# Patient Record
Sex: FEMALE | Race: WHITE | NOT HISPANIC OR LATINO
[De-identification: names, ages, dates, MRNs, and addresses within clinical notes are randomized per-mention and may not be internally consistent; named-entity substitution may affect disease eponyms.]

---

## 2014-11-05 RX ORDER — INSULIN GLARGINE 100 [IU]/ML
60 INJECTION, SOLUTION SUBCUTANEOUS
Qty: 0 | Refills: 0 | COMMUNITY
Start: 2014-11-05

## 2017-01-12 ENCOUNTER — APPOINTMENT (OUTPATIENT)
Dept: CARDIOLOGY | Facility: CLINIC | Age: 62
End: 2017-01-12

## 2017-01-12 ENCOUNTER — APPOINTMENT (OUTPATIENT)
Dept: FAMILY MEDICINE | Facility: CLINIC | Age: 62
End: 2017-01-12

## 2017-01-12 VITALS
SYSTOLIC BLOOD PRESSURE: 96 MMHG | BODY MASS INDEX: 45.66 KG/M2 | DIASTOLIC BLOOD PRESSURE: 64 MMHG | HEIGHT: 64 IN | HEART RATE: 105 BPM | OXYGEN SATURATION: 90 %

## 2017-01-12 VITALS
HEIGHT: 63.5 IN | DIASTOLIC BLOOD PRESSURE: 74 MMHG | HEART RATE: 112 BPM | SYSTOLIC BLOOD PRESSURE: 116 MMHG | WEIGHT: 266 LBS | BODY MASS INDEX: 46.55 KG/M2

## 2017-01-12 VITALS
DIASTOLIC BLOOD PRESSURE: 73 MMHG | SYSTOLIC BLOOD PRESSURE: 110 MMHG | WEIGHT: 266 LBS | HEART RATE: 104 BPM | BODY MASS INDEX: 45.66 KG/M2 | OXYGEN SATURATION: 94 %

## 2017-01-12 VITALS — HEART RATE: 100 BPM | SYSTOLIC BLOOD PRESSURE: 112 MMHG | DIASTOLIC BLOOD PRESSURE: 70 MMHG

## 2017-01-15 ENCOUNTER — NON-APPOINTMENT (OUTPATIENT)
Age: 62
End: 2017-01-15

## 2017-01-18 ENCOUNTER — APPOINTMENT (OUTPATIENT)
Dept: CARDIOLOGY | Facility: CLINIC | Age: 62
End: 2017-01-18

## 2017-01-18 ENCOUNTER — APPOINTMENT (OUTPATIENT)
Dept: UROLOGY | Facility: CLINIC | Age: 62
End: 2017-01-18

## 2017-01-18 VITALS — HEART RATE: 108 BPM | SYSTOLIC BLOOD PRESSURE: 116 MMHG | TEMPERATURE: 98 F | DIASTOLIC BLOOD PRESSURE: 72 MMHG

## 2017-01-19 ENCOUNTER — APPOINTMENT (OUTPATIENT)
Dept: CARDIOLOGY | Facility: CLINIC | Age: 62
End: 2017-01-19

## 2017-01-31 ENCOUNTER — APPOINTMENT (OUTPATIENT)
Dept: UROLOGY | Facility: CLINIC | Age: 62
End: 2017-01-31

## 2017-01-31 ENCOUNTER — APPOINTMENT (OUTPATIENT)
Dept: PULMONOLOGY | Facility: CLINIC | Age: 62
End: 2017-01-31

## 2017-01-31 VITALS — HEART RATE: 85 BPM | DIASTOLIC BLOOD PRESSURE: 60 MMHG | OXYGEN SATURATION: 93 % | SYSTOLIC BLOOD PRESSURE: 108 MMHG

## 2017-01-31 VITALS — WEIGHT: 260 LBS | BODY MASS INDEX: 45.5 KG/M2 | HEIGHT: 63.5 IN

## 2017-01-31 LAB
ANION GAP SERPL CALC-SCNC: 15 MMOL/L
APTT BLD: 33.7 SEC
BASOPHILS # BLD AUTO: 0.02 K/UL
BASOPHILS NFR BLD AUTO: 0.2 %
BUN SERPL-MCNC: 30 MG/DL
CALCIUM SERPL-MCNC: 9.2 MG/DL
CHLORIDE SERPL-SCNC: 106 MMOL/L
CO2 SERPL-SCNC: 22 MMOL/L
CREAT SERPL-MCNC: 1.42 MG/DL
EOSINOPHIL # BLD AUTO: 0.07 K/UL
EOSINOPHIL NFR BLD AUTO: 0.8 %
GLUCOSE SERPL-MCNC: 109 MG/DL
HCT VFR BLD CALC: 40.8 %
HGB BLD-MCNC: 13.1 G/DL
IMM GRANULOCYTES NFR BLD AUTO: 0.1 %
INR PPP: 1.11 RATIO
LYMPHOCYTES # BLD AUTO: 3.07 K/UL
LYMPHOCYTES NFR BLD AUTO: 35.2 %
MAN DIFF?: NORMAL
MCHC RBC-ENTMCNC: 31.3 PG
MCHC RBC-ENTMCNC: 32.1 GM/DL
MCV RBC AUTO: 97.4 FL
MONOCYTES # BLD AUTO: 1.35 K/UL
MONOCYTES NFR BLD AUTO: 15.5 %
NEUTROPHILS # BLD AUTO: 4.21 K/UL
NEUTROPHILS NFR BLD AUTO: 48.2 %
PLATELET # BLD AUTO: 329 K/UL
POTASSIUM SERPL-SCNC: 5.1 MMOL/L
PT BLD: 12.6 SEC
RBC # BLD: 4.19 M/UL
RBC # FLD: 15 %
SODIUM SERPL-SCNC: 143 MMOL/L
WBC # FLD AUTO: 8.73 K/UL

## 2017-02-03 RX ORDER — OXYCODONE AND ACETAMINOPHEN 5; 325 MG/1; MG/1
5-325 TABLET ORAL
Qty: 12 | Refills: 0 | Status: COMPLETED | COMMUNITY
Start: 2017-01-04

## 2017-02-05 LAB — COMPN STONE: NORMAL

## 2017-02-07 ENCOUNTER — APPOINTMENT (OUTPATIENT)
Dept: INTERVENTIONAL RADIOLOGY/VASCULAR | Facility: CLINIC | Age: 62
End: 2017-02-07

## 2017-02-14 ENCOUNTER — APPOINTMENT (OUTPATIENT)
Dept: INTERVENTIONAL RADIOLOGY/VASCULAR | Facility: CLINIC | Age: 62
End: 2017-02-14

## 2017-02-14 VITALS
OXYGEN SATURATION: 97 % | TEMPERATURE: 97.6 F | DIASTOLIC BLOOD PRESSURE: 68 MMHG | SYSTOLIC BLOOD PRESSURE: 109 MMHG | HEART RATE: 92 BPM | WEIGHT: 260 LBS | RESPIRATION RATE: 16 BRPM | BODY MASS INDEX: 44.39 KG/M2 | HEIGHT: 64 IN

## 2017-02-14 DIAGNOSIS — Z87.442 PERSONAL HISTORY OF URINARY CALCULI: ICD-10-CM

## 2017-02-14 DIAGNOSIS — Z63.5 DISRUPTION OF FAMILY BY SEPARATION AND DIVORCE: ICD-10-CM

## 2017-02-14 DIAGNOSIS — Z81.1 FAMILY HISTORY OF ALCOHOL ABUSE AND DEPENDENCE: ICD-10-CM

## 2017-02-14 DIAGNOSIS — Z78.9 OTHER SPECIFIED HEALTH STATUS: ICD-10-CM

## 2017-02-14 DIAGNOSIS — Z83.3 FAMILY HISTORY OF DIABETES MELLITUS: ICD-10-CM

## 2017-02-14 RX ORDER — AZITHROMYCIN 250 MG/1
250 TABLET, FILM COATED ORAL
Qty: 1 | Refills: 1 | Status: DISCONTINUED | COMMUNITY
Start: 2017-02-03 | End: 2017-02-14

## 2017-02-14 RX ORDER — GUAIFEN/DEXTROMETHORPHAN/PE 100-10-5MG
5-10-100 LIQUID (ML) ORAL EVERY 4 HOURS
Qty: 1 | Refills: 0 | Status: DISCONTINUED | COMMUNITY
Start: 2017-02-03 | End: 2017-02-14

## 2017-02-14 RX ORDER — DULAGLUTIDE 1.5 MG/.5ML
1.5 INJECTION, SOLUTION SUBCUTANEOUS
Qty: 6 | Refills: 0 | Status: DISCONTINUED | COMMUNITY
Start: 2016-12-15 | End: 2017-02-14

## 2017-02-14 RX ORDER — ONDANSETRON 4 MG/1
4 TABLET, ORALLY DISINTEGRATING ORAL
Qty: 8 | Refills: 0 | Status: DISCONTINUED | COMMUNITY
Start: 2017-01-04 | End: 2017-02-14

## 2017-02-14 RX ORDER — METOPROLOL TARTRATE 50 MG/1
50 TABLET, FILM COATED ORAL
Refills: 0 | Status: DISCONTINUED | COMMUNITY
Start: 2017-01-31 | End: 2017-02-14

## 2017-02-14 SDOH — SOCIAL STABILITY - SOCIAL INSECURITY: DISRUPTION OF FAMILY BY SEPARATION AND DIVORCE: Z63.5

## 2017-02-22 ENCOUNTER — MEDICATION RENEWAL (OUTPATIENT)
Age: 62
End: 2017-02-22

## 2017-03-02 ENCOUNTER — OUTPATIENT (OUTPATIENT)
Dept: OUTPATIENT SERVICES | Facility: HOSPITAL | Age: 62
LOS: 1 days | End: 2017-03-02
Payer: MEDICARE

## 2017-03-02 VITALS
TEMPERATURE: 98 F | OXYGEN SATURATION: 97 % | SYSTOLIC BLOOD PRESSURE: 90 MMHG | WEIGHT: 255.07 LBS | DIASTOLIC BLOOD PRESSURE: 60 MMHG | RESPIRATION RATE: 20 BRPM | HEART RATE: 82 BPM | HEIGHT: 63.5 IN

## 2017-03-02 DIAGNOSIS — Z01.818 ENCOUNTER FOR OTHER PREPROCEDURAL EXAMINATION: ICD-10-CM

## 2017-03-02 DIAGNOSIS — Z95.2 PRESENCE OF PROSTHETIC HEART VALVE: Chronic | ICD-10-CM

## 2017-03-02 DIAGNOSIS — N28.89 OTHER SPECIFIED DISORDERS OF KIDNEY AND URETER: ICD-10-CM

## 2017-03-02 DIAGNOSIS — Z95.1 PRESENCE OF AORTOCORONARY BYPASS GRAFT: Chronic | ICD-10-CM

## 2017-03-02 LAB
ANION GAP SERPL CALC-SCNC: 17 MMOL/L — SIGNIFICANT CHANGE UP (ref 5–17)
BLD GP AB SCN SERPL QL: NEGATIVE — SIGNIFICANT CHANGE UP
BUN SERPL-MCNC: 26 MG/DL — HIGH (ref 7–23)
CALCIUM SERPL-MCNC: 9.7 MG/DL — SIGNIFICANT CHANGE UP (ref 8.4–10.5)
CHLORIDE SERPL-SCNC: 104 MMOL/L — SIGNIFICANT CHANGE UP (ref 96–108)
CO2 SERPL-SCNC: 20 MMOL/L — LOW (ref 22–31)
CREAT SERPL-MCNC: 1.24 MG/DL — SIGNIFICANT CHANGE UP (ref 0.5–1.3)
GLUCOSE SERPL-MCNC: 90 MG/DL — SIGNIFICANT CHANGE UP (ref 70–99)
HCT VFR BLD CALC: 40.6 % — SIGNIFICANT CHANGE UP (ref 34.5–45)
HGB BLD-MCNC: 13.4 G/DL — SIGNIFICANT CHANGE UP (ref 11.5–15.5)
MCHC RBC-ENTMCNC: 31.3 PG — SIGNIFICANT CHANGE UP (ref 27–34)
MCHC RBC-ENTMCNC: 33 GM/DL — SIGNIFICANT CHANGE UP (ref 32–36)
MCV RBC AUTO: 94.9 FL — SIGNIFICANT CHANGE UP (ref 80–100)
PLATELET # BLD AUTO: 332 K/UL — SIGNIFICANT CHANGE UP (ref 150–400)
POTASSIUM SERPL-MCNC: 4.2 MMOL/L — SIGNIFICANT CHANGE UP (ref 3.5–5.3)
POTASSIUM SERPL-SCNC: 4.2 MMOL/L — SIGNIFICANT CHANGE UP (ref 3.5–5.3)
RBC # BLD: 4.28 M/UL — SIGNIFICANT CHANGE UP (ref 3.8–5.2)
RBC # FLD: 14.9 % — HIGH (ref 10.3–14.5)
RH IG SCN BLD-IMP: POSITIVE — SIGNIFICANT CHANGE UP
SODIUM SERPL-SCNC: 141 MMOL/L — SIGNIFICANT CHANGE UP (ref 135–145)
WBC # BLD: 12.91 K/UL — HIGH (ref 3.8–10.5)
WBC # FLD AUTO: 12.91 K/UL — HIGH (ref 3.8–10.5)

## 2017-03-02 NOTE — H&P PST ADULT - PMH
Aortic stenosis    Asthma    COPD (chronic obstructive pulmonary disease)    Diabetes mellitus    Hyperlipidemia    Hypertension    Mitral valve disorder    Obstructive sleep apnea  does not use CPAP machine

## 2017-03-02 NOTE — H&P PST ADULT - HISTORY OF PRESENT ILLNESS
62 y/o F PMH COPD, no recent steroid use, aortic stenosis, mitral valve regurgitation, S/P bioprosthetic MVR and AVR 2014, DM type 2, found to have right renal tumor while in the hospital for nephrolithiasis 1/2017, calculus passed spontaneously.  Presents today for ablation of right renal tumor. 62 y/o F PMH COPD, no recent steroid use, ARIK, aortic stenosis, mitral valve regurgitation, S/P bioprosthetic MVR and AVR 2014, DM type 2, found to have right renal tumor while in the hospital for nephrolithiasis 1/2017, calculus passed spontaneously.  Presents today for ablation of right renal tumor.

## 2017-03-02 NOTE — H&P PST ADULT - PSH
History of  section  1983  S/P AVR    S/P cholecystectomy    S/P MVR (mitral valve replacement)    S/P tonsillectomy

## 2017-03-03 PROCEDURE — 80048 BASIC METABOLIC PNL TOTAL CA: CPT

## 2017-03-03 PROCEDURE — 83036 HEMOGLOBIN GLYCOSYLATED A1C: CPT

## 2017-03-03 PROCEDURE — 87086 URINE CULTURE/COLONY COUNT: CPT

## 2017-03-03 PROCEDURE — 86900 BLOOD TYPING SEROLOGIC ABO: CPT

## 2017-03-03 PROCEDURE — 86901 BLOOD TYPING SEROLOGIC RH(D): CPT

## 2017-03-03 PROCEDURE — 86850 RBC ANTIBODY SCREEN: CPT

## 2017-03-03 PROCEDURE — 85027 COMPLETE CBC AUTOMATED: CPT

## 2017-03-03 PROCEDURE — G0463: CPT

## 2017-03-04 LAB
CULTURE RESULTS: SIGNIFICANT CHANGE UP
HBA1C BLD-MCNC: 7 % — HIGH (ref 4–5.6)
SPECIMEN SOURCE: SIGNIFICANT CHANGE UP

## 2017-03-06 ENCOUNTER — RESULT REVIEW (OUTPATIENT)
Age: 62
End: 2017-03-06

## 2017-03-07 ENCOUNTER — INPATIENT (INPATIENT)
Facility: HOSPITAL | Age: 62
LOS: 0 days | Discharge: HOME | DRG: 660 | End: 2017-03-08
Attending: INTERNAL MEDICINE | Admitting: INTERNAL MEDICINE
Payer: MEDICARE

## 2017-03-07 ENCOUNTER — APPOINTMENT (OUTPATIENT)
Dept: CT IMAGING | Facility: HOSPITAL | Age: 62
End: 2017-03-07

## 2017-03-07 VITALS
RESPIRATION RATE: 18 BRPM | OXYGEN SATURATION: 99 % | HEIGHT: 63.5 IN | DIASTOLIC BLOOD PRESSURE: 63 MMHG | TEMPERATURE: 97 F | SYSTOLIC BLOOD PRESSURE: 133 MMHG | HEART RATE: 72 BPM | WEIGHT: 255.96 LBS

## 2017-03-07 DIAGNOSIS — R58 HEMORRHAGE, NOT ELSEWHERE CLASSIFIED: ICD-10-CM

## 2017-03-07 DIAGNOSIS — N28.89 OTHER SPECIFIED DISORDERS OF KIDNEY AND URETER: ICD-10-CM

## 2017-03-07 DIAGNOSIS — D72.829 ELEVATED WHITE BLOOD CELL COUNT, UNSPECIFIED: ICD-10-CM

## 2017-03-07 DIAGNOSIS — E11.9 TYPE 2 DIABETES MELLITUS WITHOUT COMPLICATIONS: ICD-10-CM

## 2017-03-07 DIAGNOSIS — E78.5 HYPERLIPIDEMIA, UNSPECIFIED: ICD-10-CM

## 2017-03-07 DIAGNOSIS — Z95.2 PRESENCE OF PROSTHETIC HEART VALVE: Chronic | ICD-10-CM

## 2017-03-07 DIAGNOSIS — Z98.890 OTHER SPECIFIED POSTPROCEDURAL STATES: Chronic | ICD-10-CM

## 2017-03-07 DIAGNOSIS — Z01.818 ENCOUNTER FOR OTHER PREPROCEDURAL EXAMINATION: ICD-10-CM

## 2017-03-07 DIAGNOSIS — I10 ESSENTIAL (PRIMARY) HYPERTENSION: ICD-10-CM

## 2017-03-07 DIAGNOSIS — J43.9 EMPHYSEMA, UNSPECIFIED: ICD-10-CM

## 2017-03-07 LAB
APTT BLD: 29.7 SEC — SIGNIFICANT CHANGE UP (ref 27.5–37.4)
HCT VFR BLD CALC: 37.3 % — SIGNIFICANT CHANGE UP (ref 34.5–45)
HCT VFR BLD CALC: 38.5 % — SIGNIFICANT CHANGE UP (ref 34.5–45)
HGB BLD-MCNC: 12.8 G/DL — SIGNIFICANT CHANGE UP (ref 11.5–15.5)
HGB BLD-MCNC: 13.2 G/DL — SIGNIFICANT CHANGE UP (ref 11.5–15.5)
INR BLD: 1.19 RATIO — HIGH (ref 0.88–1.16)
MCHC RBC-ENTMCNC: 32.5 PG — SIGNIFICANT CHANGE UP (ref 27–34)
MCHC RBC-ENTMCNC: 32.7 PG — SIGNIFICANT CHANGE UP (ref 27–34)
MCHC RBC-ENTMCNC: 34.3 GM/DL — SIGNIFICANT CHANGE UP (ref 32–36)
MCHC RBC-ENTMCNC: 34.3 GM/DL — SIGNIFICANT CHANGE UP (ref 32–36)
MCV RBC AUTO: 94.6 FL — SIGNIFICANT CHANGE UP (ref 80–100)
MCV RBC AUTO: 95.4 FL — SIGNIFICANT CHANGE UP (ref 80–100)
PLATELET # BLD AUTO: 270 K/UL — SIGNIFICANT CHANGE UP (ref 150–400)
PLATELET # BLD AUTO: 294 K/UL — SIGNIFICANT CHANGE UP (ref 150–400)
PROTHROM AB SERPL-ACNC: 12.9 SEC — SIGNIFICANT CHANGE UP (ref 10–13.1)
RBC # BLD: 3.91 M/UL — SIGNIFICANT CHANGE UP (ref 3.8–5.2)
RBC # BLD: 4.06 M/UL — SIGNIFICANT CHANGE UP (ref 3.8–5.2)
RBC # FLD: 13.3 % — SIGNIFICANT CHANGE UP (ref 10.3–14.5)
RBC # FLD: 13.5 % — SIGNIFICANT CHANGE UP (ref 10.3–14.5)
WBC # BLD: 13.3 K/UL — HIGH (ref 3.8–10.5)
WBC # BLD: 15.6 K/UL — HIGH (ref 3.8–10.5)
WBC # FLD AUTO: 13.3 K/UL — HIGH (ref 3.8–10.5)
WBC # FLD AUTO: 15.6 K/UL — HIGH (ref 3.8–10.5)

## 2017-03-07 PROCEDURE — 50200 RENAL BIOPSY PERQ: CPT | Mod: RT

## 2017-03-07 PROCEDURE — 77012 CT SCAN FOR NEEDLE BIOPSY: CPT | Mod: 26,59

## 2017-03-07 PROCEDURE — 99223 1ST HOSP IP/OBS HIGH 75: CPT

## 2017-03-07 PROCEDURE — 50593 PERC CRYO ABLATE RENAL TUM: CPT | Mod: RT

## 2017-03-07 PROCEDURE — 88305 TISSUE EXAM BY PATHOLOGIST: CPT | Mod: 26

## 2017-03-07 PROCEDURE — 88173 CYTOPATH EVAL FNA REPORT: CPT | Mod: 26

## 2017-03-07 PROCEDURE — 77013 CT GUIDE FOR TISSUE ABLATION: CPT | Mod: 26

## 2017-03-07 RX ORDER — LISINOPRIL 2.5 MG/1
5 TABLET ORAL DAILY
Qty: 0 | Refills: 0 | Status: DISCONTINUED | OUTPATIENT
Start: 2017-03-07 | End: 2017-03-08

## 2017-03-07 RX ORDER — FLUTICASONE PROPIONATE 50 MCG
1 SPRAY, SUSPENSION NASAL DAILY
Qty: 0 | Refills: 0 | Status: DISCONTINUED | OUTPATIENT
Start: 2017-03-07 | End: 2017-03-08

## 2017-03-07 RX ORDER — BUPROPION HYDROCHLORIDE 150 MG/1
200 TABLET, EXTENDED RELEASE ORAL DAILY
Qty: 0 | Refills: 0 | Status: DISCONTINUED | OUTPATIENT
Start: 2017-03-07 | End: 2017-03-08

## 2017-03-07 RX ORDER — DEXTROSE 50 % IN WATER 50 %
25 SYRINGE (ML) INTRAVENOUS ONCE
Qty: 0 | Refills: 0 | Status: DISCONTINUED | OUTPATIENT
Start: 2017-03-07 | End: 2017-03-08

## 2017-03-07 RX ORDER — METOPROLOL TARTRATE 50 MG
12.5 TABLET ORAL
Qty: 0 | Refills: 0 | Status: DISCONTINUED | OUTPATIENT
Start: 2017-03-07 | End: 2017-03-08

## 2017-03-07 RX ORDER — ACETAMINOPHEN 500 MG
650 TABLET ORAL EVERY 6 HOURS
Qty: 0 | Refills: 0 | Status: DISCONTINUED | OUTPATIENT
Start: 2017-03-07 | End: 2017-03-08

## 2017-03-07 RX ORDER — INSULIN GLARGINE 100 [IU]/ML
40 INJECTION, SOLUTION SUBCUTANEOUS AT BEDTIME
Qty: 0 | Refills: 0 | Status: DISCONTINUED | OUTPATIENT
Start: 2017-03-07 | End: 2017-03-08

## 2017-03-07 RX ORDER — ALBUTEROL 90 UG/1
2 AEROSOL, METERED ORAL
Qty: 0 | Refills: 0 | COMMUNITY

## 2017-03-07 RX ORDER — ALBUTEROL 90 UG/1
2.5 AEROSOL, METERED ORAL EVERY 4 HOURS
Qty: 0 | Refills: 0 | Status: DISCONTINUED | OUTPATIENT
Start: 2017-03-07 | End: 2017-03-07

## 2017-03-07 RX ORDER — DEXTROSE 50 % IN WATER 50 %
1 SYRINGE (ML) INTRAVENOUS ONCE
Qty: 0 | Refills: 0 | Status: DISCONTINUED | OUTPATIENT
Start: 2017-03-07 | End: 2017-03-08

## 2017-03-07 RX ORDER — ATORVASTATIN CALCIUM 80 MG/1
40 TABLET, FILM COATED ORAL AT BEDTIME
Qty: 0 | Refills: 0 | Status: DISCONTINUED | OUTPATIENT
Start: 2017-03-07 | End: 2017-03-08

## 2017-03-07 RX ORDER — INSULIN LISPRO 100/ML
VIAL (ML) SUBCUTANEOUS
Qty: 0 | Refills: 0 | Status: DISCONTINUED | OUTPATIENT
Start: 2017-03-07 | End: 2017-03-08

## 2017-03-07 RX ORDER — METOPROLOL TARTRATE 50 MG
1 TABLET ORAL
Qty: 0 | Refills: 0 | COMMUNITY

## 2017-03-07 RX ORDER — GABAPENTIN 400 MG/1
300 CAPSULE ORAL
Qty: 0 | Refills: 0 | Status: DISCONTINUED | OUTPATIENT
Start: 2017-03-07 | End: 2017-03-08

## 2017-03-07 RX ORDER — GLUCAGON INJECTION, SOLUTION 0.5 MG/.1ML
1 INJECTION, SOLUTION SUBCUTANEOUS ONCE
Qty: 0 | Refills: 0 | Status: DISCONTINUED | OUTPATIENT
Start: 2017-03-07 | End: 2017-03-08

## 2017-03-07 RX ORDER — ALBUTEROL 90 UG/1
2.5 AEROSOL, METERED ORAL EVERY 4 HOURS
Qty: 0 | Refills: 0 | Status: DISCONTINUED | OUTPATIENT
Start: 2017-03-07 | End: 2017-03-08

## 2017-03-07 RX ORDER — SODIUM CHLORIDE 9 MG/ML
1000 INJECTION, SOLUTION INTRAVENOUS
Qty: 0 | Refills: 0 | Status: DISCONTINUED | OUTPATIENT
Start: 2017-03-07 | End: 2017-03-08

## 2017-03-07 RX ORDER — LORATADINE 10 MG/1
10 TABLET ORAL DAILY
Qty: 0 | Refills: 0 | Status: DISCONTINUED | OUTPATIENT
Start: 2017-03-07 | End: 2017-03-08

## 2017-03-07 RX ORDER — TIOTROPIUM BROMIDE 18 UG/1
1 CAPSULE ORAL; RESPIRATORY (INHALATION) DAILY
Qty: 0 | Refills: 0 | Status: DISCONTINUED | OUTPATIENT
Start: 2017-03-07 | End: 2017-03-08

## 2017-03-07 RX ORDER — DEXTROSE 50 % IN WATER 50 %
12.5 SYRINGE (ML) INTRAVENOUS ONCE
Qty: 0 | Refills: 0 | Status: DISCONTINUED | OUTPATIENT
Start: 2017-03-07 | End: 2017-03-08

## 2017-03-07 RX ADMIN — ATORVASTATIN CALCIUM 40 MILLIGRAM(S): 80 TABLET, FILM COATED ORAL at 22:11

## 2017-03-07 RX ADMIN — TIOTROPIUM BROMIDE 1 CAPSULE(S): 18 CAPSULE ORAL; RESPIRATORY (INHALATION) at 17:00

## 2017-03-07 RX ADMIN — LORATADINE 10 MILLIGRAM(S): 10 TABLET ORAL at 22:11

## 2017-03-07 RX ADMIN — INSULIN GLARGINE 40 UNIT(S): 100 INJECTION, SOLUTION SUBCUTANEOUS at 22:11

## 2017-03-07 NOTE — H&P ADULT. - ASSESSMENT
62 yo woman with PMH of COPD, ARIK on CPAP, AS/MR s/p bioprosthetic MVR/AVR 2014, DM2 on insulin, HLD admitted s/p CT guided kidney mass biopsy and mass cryoablation by IR.  Procedure c/b small renal bleed.  Pt c/o R flank/chest pain.  Vitals stable.

## 2017-03-07 NOTE — H&P ADULT. - PROBLEM SELECTOR PLAN 5
Hold PO diabetic meds  Will lower lantus to 40units tonight due to decreased PO diet  Unclear trulicity dose - neither pt nor daughter know dose, not in PMD records - but due on Mondays so does not need today or tomorrow

## 2017-03-07 NOTE — H&P ADULT. - PMH
Aortic stenosis    Asthma    COPD (chronic obstructive pulmonary disease)    Diabetes mellitus    Hyperlipidemia    Hypertension    Kidney mass    Mitral valve disorder    Obstructive sleep apnea  does not use CPAP machine

## 2017-03-07 NOTE — H&P ADULT. - PROBLEM SELECTOR PLAN 7
Known on outpt labs - no evidence of any infection - may be related to possible kidney cancer - can f/u as outpt

## 2017-03-07 NOTE — H&P ADULT. - PSH
History of  section  1983  S/P AVR    S/P cholecystectomy    S/P MVR (mitral valve replacement)    S/P tonsillectomy    Status post cryoablation  Of R kidney mass 3/7/17

## 2017-03-07 NOTE — H&P ADULT. - PROBLEM SELECTOR PLAN 2
Procedure with small post-op renal bleed - checking another CBC stat now, if stable can check again in AM  Holding DVT ppx and ASA for now

## 2017-03-07 NOTE — H&P ADULT. - FAMILY HISTORY
Sibling  Still living? Yes, Estimated age: 51-60  Family history of hypertension, Age at diagnosis: Age Unknown

## 2017-03-07 NOTE — H&P ADULT. - HISTORY OF PRESENT ILLNESS
60 yo woman with PMH of COPD, ARIK on CPAP, AS/MR s/p bioprosthetic MVR/AVR 2014, DM2 on insulin, HLD admitted s/p IR procedure.  Pt reportedly had an incidental finding on a CT scan of a R kidney mass.  Today she underwent a CT guided biopsy and cryoablation of the mass.  Per procedure note, post-op there was blood seen around the kidney but not thought to be of clinical significance.  Pt reports R flank/chest pain, worse on inspiration, started after procedure, 5/10 in severity.  Denies SOB, dizziness, N/V.

## 2017-03-08 ENCOUNTER — TRANSCRIPTION ENCOUNTER (OUTPATIENT)
Age: 62
End: 2017-03-08

## 2017-03-08 VITALS
TEMPERATURE: 99 F | OXYGEN SATURATION: 97 % | HEART RATE: 93 BPM | SYSTOLIC BLOOD PRESSURE: 124 MMHG | DIASTOLIC BLOOD PRESSURE: 58 MMHG | RESPIRATION RATE: 18 BRPM

## 2017-03-08 LAB
ALBUMIN SERPL ELPH-MCNC: 3.1 G/DL — LOW (ref 3.3–5)
ALP SERPL-CCNC: 115 U/L — SIGNIFICANT CHANGE UP (ref 40–120)
ALT FLD-CCNC: 94 U/L — HIGH (ref 10–45)
ANION GAP SERPL CALC-SCNC: 14 MMOL/L — SIGNIFICANT CHANGE UP (ref 5–17)
AST SERPL-CCNC: 99 U/L — HIGH (ref 10–40)
BASOPHILS # BLD AUTO: 0 K/UL — SIGNIFICANT CHANGE UP (ref 0–0.2)
BASOPHILS NFR BLD AUTO: 0 % — SIGNIFICANT CHANGE UP (ref 0–2)
BILIRUB SERPL-MCNC: 0.6 MG/DL — SIGNIFICANT CHANGE UP (ref 0.2–1.2)
BUN SERPL-MCNC: 27 MG/DL — HIGH (ref 7–23)
CALCIUM SERPL-MCNC: 8.6 MG/DL — SIGNIFICANT CHANGE UP (ref 8.4–10.5)
CHLORIDE SERPL-SCNC: 104 MMOL/L — SIGNIFICANT CHANGE UP (ref 96–108)
CO2 SERPL-SCNC: 20 MMOL/L — LOW (ref 22–31)
CREAT SERPL-MCNC: 1.19 MG/DL — SIGNIFICANT CHANGE UP (ref 0.5–1.3)
EOSINOPHIL # BLD AUTO: 0.23 K/UL — SIGNIFICANT CHANGE UP (ref 0–0.5)
EOSINOPHIL NFR BLD AUTO: 1.7 % — SIGNIFICANT CHANGE UP (ref 0–6)
GLUCOSE SERPL-MCNC: 109 MG/DL — HIGH (ref 70–99)
HCT VFR BLD CALC: 36 % — SIGNIFICANT CHANGE UP (ref 34.5–45)
HGB BLD-MCNC: 11.7 G/DL — SIGNIFICANT CHANGE UP (ref 11.5–15.5)
LYMPHOCYTES # BLD AUTO: 23.5 % — SIGNIFICANT CHANGE UP (ref 13–44)
LYMPHOCYTES # BLD AUTO: 3.21 K/UL — SIGNIFICANT CHANGE UP (ref 1–3.3)
MCHC RBC-ENTMCNC: 31 PG — SIGNIFICANT CHANGE UP (ref 27–34)
MCHC RBC-ENTMCNC: 32.5 GM/DL — SIGNIFICANT CHANGE UP (ref 32–36)
MCV RBC AUTO: 95.5 FL — SIGNIFICANT CHANGE UP (ref 80–100)
MONOCYTES # BLD AUTO: 0.96 K/UL — HIGH (ref 0–0.9)
MONOCYTES NFR BLD AUTO: 7 % — SIGNIFICANT CHANGE UP (ref 2–14)
NEUTROPHILS # BLD AUTO: 9.27 K/UL — HIGH (ref 1.8–7.4)
NEUTROPHILS NFR BLD AUTO: 67.8 % — SIGNIFICANT CHANGE UP (ref 43–77)
PLATELET # BLD AUTO: 270 K/UL — SIGNIFICANT CHANGE UP (ref 150–400)
POTASSIUM SERPL-MCNC: 5 MMOL/L — SIGNIFICANT CHANGE UP (ref 3.5–5.3)
POTASSIUM SERPL-SCNC: 5 MMOL/L — SIGNIFICANT CHANGE UP (ref 3.5–5.3)
PROT SERPL-MCNC: 7.1 G/DL — SIGNIFICANT CHANGE UP (ref 6–8.3)
RBC # BLD: 3.77 M/UL — LOW (ref 3.8–5.2)
RBC # FLD: 14.7 % — HIGH (ref 10.3–14.5)
SODIUM SERPL-SCNC: 138 MMOL/L — SIGNIFICANT CHANGE UP (ref 135–145)
WBC # BLD: 13.67 K/UL — HIGH (ref 3.8–10.5)
WBC # FLD AUTO: 13.67 K/UL — HIGH (ref 3.8–10.5)

## 2017-03-08 PROCEDURE — 94640 AIRWAY INHALATION TREATMENT: CPT

## 2017-03-08 PROCEDURE — 88305 TISSUE EXAM BY PATHOLOGIST: CPT

## 2017-03-08 PROCEDURE — 80053 COMPREHEN METABOLIC PANEL: CPT

## 2017-03-08 PROCEDURE — 77013 CT GUIDE FOR TISSUE ABLATION: CPT

## 2017-03-08 PROCEDURE — 85730 THROMBOPLASTIN TIME PARTIAL: CPT

## 2017-03-08 PROCEDURE — 88173 CYTOPATH EVAL FNA REPORT: CPT

## 2017-03-08 PROCEDURE — 99239 HOSP IP/OBS DSCHRG MGMT >30: CPT

## 2017-03-08 PROCEDURE — 50593 PERC CRYO ABLATE RENAL TUM: CPT

## 2017-03-08 PROCEDURE — 86900 BLOOD TYPING SEROLOGIC ABO: CPT

## 2017-03-08 PROCEDURE — 50200 RENAL BIOPSY PERQ: CPT

## 2017-03-08 PROCEDURE — 85610 PROTHROMBIN TIME: CPT

## 2017-03-08 PROCEDURE — 88172 CYTP DX EVAL FNA 1ST EA SITE: CPT

## 2017-03-08 PROCEDURE — 85027 COMPLETE CBC AUTOMATED: CPT

## 2017-03-08 PROCEDURE — 77012 CT SCAN FOR NEEDLE BIOPSY: CPT | Mod: 59

## 2017-03-08 PROCEDURE — C2618: CPT

## 2017-03-08 PROCEDURE — 86901 BLOOD TYPING SEROLOGIC RH(D): CPT

## 2017-03-08 PROCEDURE — 86850 RBC ANTIBODY SCREEN: CPT

## 2017-03-08 RX ORDER — LORATADINE 10 MG/1
1 TABLET ORAL
Qty: 0 | Refills: 0 | COMMUNITY
Start: 2017-03-08

## 2017-03-08 RX ORDER — FUROSEMIDE 40 MG
1 TABLET ORAL
Qty: 0 | Refills: 0 | COMMUNITY

## 2017-03-08 RX ORDER — ACETAMINOPHEN 500 MG
2 TABLET ORAL
Qty: 0 | Refills: 0 | COMMUNITY
Start: 2017-03-08

## 2017-03-08 RX ADMIN — BUPROPION HYDROCHLORIDE 200 MILLIGRAM(S): 150 TABLET, EXTENDED RELEASE ORAL at 11:49

## 2017-03-08 RX ADMIN — TIOTROPIUM BROMIDE 1 CAPSULE(S): 18 CAPSULE ORAL; RESPIRATORY (INHALATION) at 11:50

## 2017-03-08 RX ADMIN — LORATADINE 10 MILLIGRAM(S): 10 TABLET ORAL at 11:52

## 2017-03-08 RX ADMIN — Medication 2: at 11:49

## 2017-03-08 NOTE — PROVIDER CONTACT NOTE (MEDICATION) - RECOMMENDATIONS
Hold Lisinopril 5mg, metoprolol 12.5mg is SBP<110 Hold Lisinopril 5mg if is SBP<110, metoprolol 12.5mg if SBP<100

## 2017-03-08 NOTE — DIETITIAN INITIAL EVALUATION ADULT. - OTHER INFO
Nutrition consult received for BMI > 40. Pt reports good appetite consuming >75% dinner last night. Denies recent GI distress but states some nausea secondary to kidney stones x 2 months PTA. Pt reports starting diet to lose weight ~1 month PTA by counting points from [employer], following [employer] book, and using handouts about counting carbohydrates. Pt manages T2DM at home by counting carbohydrates, checking fingersticks three times daily at home (usually 90-130mg/dL), taking Lantus at night, taking Invokana in the morning, taking Trulicity once weekly, and taking Glimepiride once daily. Pt displays good understanding of carbohydrate counting. Pt wears partial dentures and has some bottom teeth missing but denies difficulty chewing and need for texture modified diet. Denies difficulty swallowing. Reports NKFA.

## 2017-03-08 NOTE — DISCHARGE NOTE ADULT - PLAN OF CARE
RESOLUTION OF MASS Please follow up with Dr. Rodriguez for biopsy results and Dr Reno for post-op care cpap at nights Call your Health Care provider upon arrival home to make a follow up appointment within one week.  Take all inhalers as prescribed by your Health Care Provider.  Take steroids as prescribed by your Health Care Provider.  If your cough increases infrequency and severity and/or you have shortness of breath or increased shortness of breath call your Health Care Provider.  If you develop fever, chills, night sweats, malaise, and/or change in mental status call your Health care Provider.  Nutrition is very important.  Eat small frequent meals.  Increase your activity as tolerated.  Do not stay in bed all day Coronary artery disease is a condition where the arteries the supply the heart muscle get clogges with fatty deposits & puts you at risk for a heart attack  Call your doctor if you have any new pain, pressure, or discomfort in the center of your chest, pain, tingling or discomfort in arms, back, neck, jaw, or stomach, shortness of breath, nausea, vomiting, burping or heartburn, sweating, cold and clammy skin, racing or abnormal heartbeat for more than 10 minutes or if they keep coming & going.  Call 911 and do not tr to get to hospital by care  You can help yourself with lefestyle changes (quitting smoking if you smoke), eat lots of fruits & vegetables & low fat dairy products, not a lot of meat & fatty foods, walk or some form of physical activity most days of the week, lose weight if you are overweight  Take your cardiac medication as prescribed to lower cholesterol, to lower blood pressure, aspirin to prevent blood clots, and diabetes control  Make sure to keep appointments with doctor for cardiac follow up care HgA1C this admission.  Make sure you get your HgA1c checked every three months.  If you take oral diabetes medications, check your blood glucose two times a day.  If you take insulin, check your blood glucose before meals and at bedtime.  It's important not to skip any meals.  Keep a log of your blood glucose results and always take it with you to your doctor appointments.  Keep a list of your current medications including injectables and over the counter medications and bring this medication list with you to all your doctor appointments.  If you have not seen your ophthalmologist this year call for appointment.  Check your feet daily for redness, sores, or openings. Do not self treat. If no improvement in two days call your primary care physician for an appointment.  Low blood sugar (hypoglycemia) is a blood sugar below 70mg/dl. Check your blood sugar if you feel signs/symptoms of hypoglycemia. If your blood sugar is below 70 take 15 grams of carbohydrates (ex 4 oz of apple juice, 3-4 glucose tablets, or 4-6 oz of regular soda) wait 15 minutes and repeat blood sugar to make sure it comes up above 70.  If your blood sugar is above 70 and you are due for a meal, have a meal.  If you are not due for a meal have a snack.  This snack helps keeps your blood sugar at a safe range. Please do not resume your lasix until speak with your PMD, your blood pressure has been low. Please see Dr Viji Barton for management and follow up for subnormal blood pressure

## 2017-03-08 NOTE — DISCHARGE NOTE ADULT - SECONDARY DIAGNOSIS.
Obstructive sleep apnea Chronic obstructive pulmonary disease, unspecified COPD type Hyperlipidemia, unspecified hyperlipidemia type Type 2 diabetes mellitus without complication, with long-term current use of insulin Essential hypertension

## 2017-03-08 NOTE — DISCHARGE NOTE ADULT - CARE PROVIDERS DIRECT ADDRESSES
,DirectAddress_Unknown,[email or web address],DirectAddress_Unknown

## 2017-03-08 NOTE — DISCHARGE NOTE ADULT - HOSPITAL COURSE
attending's note 62 yo woman with COPD, ARIK on CPAP, AS/MR s/p bioprosthetic MVR/AVR 2014, DM2 on insulin HLD admitted s/p IR procedure. Had an incidental finding on a CT scan of a R kidney mass. Underwent a CT guided biopsy and cryoablation of the mass by IR. Blood seen around the kidney on post procedural images. Pt c/o R flank/chest pain, worse on inspiration, started after procedure, 5/10 in severity.     Admitted for observation. Hb dropped from 13.2 to 11.7. Pain and symptoms improved.     Seen by IR Dr. Landa the next day and cleared for discharge. To f/u with urology and IR.     Diagnoses: Post procedural bleeding; Kidney mass; DM-2; HTN; Morbid obesity

## 2017-03-08 NOTE — DISCHARGE NOTE ADULT - MEDICATION SUMMARY - MEDICATIONS TO TAKE
I will START or STAY ON the medications listed below when I get home from the hospital:    aspirin 325 mg oral tablet  -- 1 tab(s) by mouth once a day  -- Indication: For Cad    acetaminophen 325 mg oral tablet  -- 2 tab(s) by mouth every 6 hours, As needed, Mild Pain (1 - 3)  -- Indication: For Pain    lisinopril 5 mg oral tablet  -- 1 tab(s) by mouth once a day  -- Indication: For Htn    gabapentin 300 mg oral capsule  -- 1 cap(s) by mouth 2 times a day, As Needed  -- Indication: For Pain    glimepiride 4 mg oral tablet  -- 1 tab(s) by mouth 2 times a day  -- Indication: For diabetes type  2    Invokana 100 mg oral tablet  -- 1 tab(s) by mouth once a day  -- Indication: For diabetes type 2    insulin glargine 100 units/mL subcutaneous solution  -- 60 unit(s) subcutaneous once a day (at bedtime)  -- Indication: For diabetes type 2    Trulicity Pen 0.75 mg/0.5 mL subcutaneous solution  --  subcutaneous once a week  -- Indication: For DIABETES TYPE 2    desloratadine 5 mg oral tablet  -- 1 tab(s) by mouth once a day  -- Indication: For ALLERGY    loratadine 10 mg oral tablet  -- 1 tab(s) by mouth once a day  -- Indication: For ALLERGY    atorvastatin 40 mg oral tablet  -- 1 tab(s) by mouth once a day (at bedtime)  -- Indication: For DYSLIPIDEMIA    metoprolol tartrate 25 mg oral tablet  -- 0.5 tab(s) by mouth 2 times a day  -- Indication: For HTN    albuterol 2.5 mg/3 mL (0.083%) inhalation solution  -- 3 milliliter(s) inhaled every 6 hours, As Needed  -- Indication: For ASTHMA    Spiriva 18 mcg inhalation capsule  -- 1 cap(s) inhaled once a day  -- Indication: For ASTHMA    fluticasone 50 mcg/inh nasal spray  -- 1 spray(s) into nose once a day  -- Indication: For ASTHMA    buPROPion 200 mg/12 hours oral tablet, extended release  -- 1 tab(s) by mouth once a day  -- Indication: For DEPRESSION

## 2017-03-08 NOTE — DISCHARGE NOTE ADULT - CARE PLAN
Principal Discharge DX:	Kidney mass  Secondary Diagnosis:	Obstructive sleep apnea  Secondary Diagnosis:	Chronic obstructive pulmonary disease, unspecified COPD type  Secondary Diagnosis:	Hyperlipidemia, unspecified hyperlipidemia type  Secondary Diagnosis:	Type 2 diabetes mellitus without complication, with long-term current use of insulin Principal Discharge DX:	Kidney mass  Goal:	RESOLUTION OF MASS  Instructions for follow-up, activity and diet:	Please follow up with Dr. Rodriguez for biopsy results and Dr Reno for post-op care  Secondary Diagnosis:	Obstructive sleep apnea  Instructions for follow-up, activity and diet:	cpap at nights  Secondary Diagnosis:	Chronic obstructive pulmonary disease, unspecified COPD type  Instructions for follow-up, activity and diet:	Call your Health Care provider upon arrival home to make a follow up appointment within one week.  Take all inhalers as prescribed by your Health Care Provider.  Take steroids as prescribed by your Health Care Provider.  If your cough increases infrequency and severity and/or you have shortness of breath or increased shortness of breath call your Health Care Provider.  If you develop fever, chills, night sweats, malaise, and/or change in mental status call your Health care Provider.  Nutrition is very important.  Eat small frequent meals.  Increase your activity as tolerated.  Do not stay in bed all day  Secondary Diagnosis:	Hyperlipidemia, unspecified hyperlipidemia type  Instructions for follow-up, activity and diet:	Coronary artery disease is a condition where the arteries the supply the heart muscle get clogges with fatty deposits & puts you at risk for a heart attack  Call your doctor if you have any new pain, pressure, or discomfort in the center of your chest, pain, tingling or discomfort in arms, back, neck, jaw, or stomach, shortness of breath, nausea, vomiting, burping or heartburn, sweating, cold and clammy skin, racing or abnormal heartbeat for more than 10 minutes or if they keep coming & going.  Call 911 and do not tr to get to hospital by care  You can help yourself with lefestyle changes (quitting smoking if you smoke), eat lots of fruits & vegetables & low fat dairy products, not a lot of meat & fatty foods, walk or some form of physical activity most days of the week, lose weight if you are overweight  Take your cardiac medication as prescribed to lower cholesterol, to lower blood pressure, aspirin to prevent blood clots, and diabetes control  Make sure to keep appointments with doctor for cardiac follow up care  Secondary Diagnosis:	Type 2 diabetes mellitus without complication, with long-term current use of insulin  Instructions for follow-up, activity and diet:	HgA1C this admission.  Make sure you get your HgA1c checked every three months.  If you take oral diabetes medications, check your blood glucose two times a day.  If you take insulin, check your blood glucose before meals and at bedtime.  It's important not to skip any meals.  Keep a log of your blood glucose results and always take it with you to your doctor appointments.  Keep a list of your current medications including injectables and over the counter medications and bring this medication list with you to all your doctor appointments.  If you have not seen your ophthalmologist this year call for appointment.  Check your feet daily for redness, sores, or openings. Do not self treat. If no improvement in two days call your primary care physician for an appointment.  Low blood sugar (hypoglycemia) is a blood sugar below 70mg/dl. Check your blood sugar if you feel signs/symptoms of hypoglycemia. If your blood sugar is below 70 take 15 grams of carbohydrates (ex 4 oz of apple juice, 3-4 glucose tablets, or 4-6 oz of regular soda) wait 15 minutes and repeat blood sugar to make sure it comes up above 70.  If your blood sugar is above 70 and you are due for a meal, have a meal.  If you are not due for a meal have a snack.  This snack helps keeps your blood sugar at a safe range.  Secondary Diagnosis:	Essential hypertension  Instructions for follow-up, activity and diet:	Please do not resume your lasix until speak with your PMD, your blood pressure has been low. Please see Dr Viji Barton for management and follow up for subnormal blood pressure

## 2017-03-08 NOTE — DIETITIAN INITIAL EVALUATION ADULT. - ADHERENCE
good/Pt reports counting points from [employer] diet which she belonged to years ago. Diet recall includes two meals daily consisting of tuna salad with crackers for lunch, and chicken with brown rice and vegetables for dinner. Pt states she typically eats fruit for snacks and reports sometimes consuming cheesedoodles with her grandson. Pt reports taking vitamin D supplement PTA. good/Pt reports counting points from [employer] diet which she belonged to years ago. Diet recall includes two meals daily consisting of tuna salad with crackers for lunch, and chicken with brown rice and vegetables for dinner. Pt states she typically eats fruit for snacks and reports sometimes consuming cheesedoodles with her grandson. Pt states she monitors intake of sodium but states she adds salt when cooking. Pt reports taking vitamin D supplement PTA.

## 2017-03-08 NOTE — DIETITIAN INITIAL EVALUATION ADULT. - ENERGY NEEDS
Height: 62 inches, (3/8) Weight: 255.7 pounds, BMI: 43.9, IBW: 120 pounds (+/-10%), 213%IBW  Pertinent Information:   Pt admitted for IR procedure, s/p ablation of right renal tumor (2/6). 2+edema anabell legs. No pressure ulcers noted. Height: 64 inches, (3/8) Weight: 255.7 pounds, BMI: 43.9, IBW: 120 pounds (+/-10%), 213%IBW  Pertinent Information:   Pt admitted for IR procedure, s/p ablation of right renal tumor (2/6). 2+edema anabell legs. No pressure ulcers noted.

## 2017-03-08 NOTE — DISCHARGE NOTE ADULT - CARE PROVIDER_API CALL
J Luis Rodriguez), Urology  [address] [address]  Phone: [phone]  Fax: [phone]    Waqar Landa), Diagnostic Radiology; [facility]  [address] [place] [address]  Phone: [phone]  Fax: [phone]

## 2017-03-08 NOTE — DIETITIAN INITIAL EVALUATION ADULT. - NS AS NUTRI INTERV ED CONTENT
Reviewed T2DM nutrition therapy including which foods have carbohydrates, how to count carbohydrates, how to meal plan including the MyPlate meal planning method and pairing carbohydrates with protein, the differences between simple and complex carbohydrates and their effects on blood glucose levels, and hypoglycemia protocol. Discussed weight management diet including small frequent meals of well balanced foods and options for healthy snacks.

## 2017-03-10 ENCOUNTER — MESSAGE (OUTPATIENT)
Age: 62
End: 2017-03-10

## 2017-03-13 ENCOUNTER — APPOINTMENT (OUTPATIENT)
Dept: UROLOGY | Facility: CLINIC | Age: 62
End: 2017-03-13

## 2017-03-17 ENCOUNTER — APPOINTMENT (OUTPATIENT)
Dept: CARDIOLOGY | Facility: CLINIC | Age: 62
End: 2017-03-17

## 2017-04-04 ENCOUNTER — APPOINTMENT (OUTPATIENT)
Dept: PULMONOLOGY | Facility: CLINIC | Age: 62
End: 2017-04-04

## 2017-04-06 ENCOUNTER — APPOINTMENT (OUTPATIENT)
Dept: UROLOGY | Facility: CLINIC | Age: 62
End: 2017-04-06

## 2017-04-06 ENCOUNTER — LABORATORY RESULT (OUTPATIENT)
Age: 62
End: 2017-04-06

## 2017-04-06 DIAGNOSIS — I50.9 HEART FAILURE, UNSPECIFIED: ICD-10-CM

## 2017-04-10 LAB
ANION GAP SERPL CALC-SCNC: 15 MMOL/L
APTT BLD: 31.5 SEC
BASOPHILS # BLD AUTO: 0.27 K/UL
BASOPHILS NFR BLD AUTO: 2 %
BUN SERPL-MCNC: 44 MG/DL
CALCIUM SERPL-MCNC: 9.5 MG/DL
CHLORIDE SERPL-SCNC: 104 MMOL/L
CO2 SERPL-SCNC: 22 MMOL/L
CREAT SERPL-MCNC: 1.5 MG/DL
EOSINOPHIL # BLD AUTO: 0.4 K/UL
EOSINOPHIL NFR BLD AUTO: 3 %
GLUCOSE SERPL-MCNC: 80 MG/DL
HCT VFR BLD CALC: 38.1 %
HGB BLD-MCNC: 12.1 G/DL
INR PPP: 1.06 RATIO
LYMPHOCYTES # BLD AUTO: 4.16 K/UL
LYMPHOCYTES NFR BLD AUTO: 31 %
MAN DIFF?: NORMAL
MCHC RBC-ENTMCNC: 30.7 PG
MCHC RBC-ENTMCNC: 31.8 GM/DL
MCV RBC AUTO: 96.7 FL
MONOCYTES # BLD AUTO: 1.88 K/UL
MONOCYTES NFR BLD AUTO: 14 %
NEUTROPHILS # BLD AUTO: 6.71 K/UL
NEUTROPHILS NFR BLD AUTO: 50 %
PLATELET # BLD AUTO: 379 K/UL
POTASSIUM SERPL-SCNC: 4.8 MMOL/L
PT BLD: 12 SEC
RBC # BLD: 3.94 M/UL
RBC # FLD: 14.7 %
SODIUM SERPL-SCNC: 141 MMOL/L
WBC # FLD AUTO: 13.41 K/UL

## 2017-04-23 ENCOUNTER — RX RENEWAL (OUTPATIENT)
Age: 62
End: 2017-04-23

## 2017-04-28 ENCOUNTER — APPOINTMENT (OUTPATIENT)
Dept: INTERVENTIONAL RADIOLOGY/VASCULAR | Facility: CLINIC | Age: 62
End: 2017-04-28

## 2017-04-28 VITALS
SYSTOLIC BLOOD PRESSURE: 124 MMHG | TEMPERATURE: 99.8 F | DIASTOLIC BLOOD PRESSURE: 76 MMHG | WEIGHT: 253 LBS | RESPIRATION RATE: 18 BRPM | HEART RATE: 100 BPM | HEIGHT: 63.5 IN | BODY MASS INDEX: 44.27 KG/M2 | OXYGEN SATURATION: 95 %

## 2017-05-02 ENCOUNTER — RX RENEWAL (OUTPATIENT)
Age: 62
End: 2017-05-02

## 2017-05-04 ENCOUNTER — RX RENEWAL (OUTPATIENT)
Age: 62
End: 2017-05-04

## 2017-05-05 ENCOUNTER — OTHER (OUTPATIENT)
Age: 62
End: 2017-05-05

## 2017-05-16 ENCOUNTER — RESULT REVIEW (OUTPATIENT)
Age: 62
End: 2017-05-16

## 2017-05-16 ENCOUNTER — INPATIENT (INPATIENT)
Facility: HOSPITAL | Age: 62
LOS: 0 days | Discharge: HOME | DRG: 657 | End: 2017-05-17
Attending: INTERNAL MEDICINE | Admitting: INTERNAL MEDICINE
Payer: MEDICARE

## 2017-05-16 ENCOUNTER — APPOINTMENT (OUTPATIENT)
Dept: CT IMAGING | Facility: HOSPITAL | Age: 62
End: 2017-05-16

## 2017-05-16 VITALS
RESPIRATION RATE: 24 BRPM | OXYGEN SATURATION: 97 % | HEART RATE: 77 BPM | TEMPERATURE: 97 F | SYSTOLIC BLOOD PRESSURE: 138 MMHG | DIASTOLIC BLOOD PRESSURE: 71 MMHG

## 2017-05-16 DIAGNOSIS — I10 ESSENTIAL (PRIMARY) HYPERTENSION: ICD-10-CM

## 2017-05-16 DIAGNOSIS — Z95.2 PRESENCE OF PROSTHETIC HEART VALVE: Chronic | ICD-10-CM

## 2017-05-16 DIAGNOSIS — N28.89 OTHER SPECIFIED DISORDERS OF KIDNEY AND URETER: ICD-10-CM

## 2017-05-16 DIAGNOSIS — E78.2 MIXED HYPERLIPIDEMIA: ICD-10-CM

## 2017-05-16 DIAGNOSIS — Z98.890 OTHER SPECIFIED POSTPROCEDURAL STATES: Chronic | ICD-10-CM

## 2017-05-16 DIAGNOSIS — E11.9 TYPE 2 DIABETES MELLITUS WITHOUT COMPLICATIONS: ICD-10-CM

## 2017-05-16 DIAGNOSIS — J45.909 UNSPECIFIED ASTHMA, UNCOMPLICATED: ICD-10-CM

## 2017-05-16 LAB
HCT VFR BLD CALC: 39.9 % — SIGNIFICANT CHANGE UP (ref 34.5–45)
HGB BLD-MCNC: 13.1 G/DL — SIGNIFICANT CHANGE UP (ref 11.5–15.5)
MCHC RBC-ENTMCNC: 32 PG — SIGNIFICANT CHANGE UP (ref 27–34)
MCHC RBC-ENTMCNC: 32.9 GM/DL — SIGNIFICANT CHANGE UP (ref 32–36)
MCV RBC AUTO: 97.2 FL — SIGNIFICANT CHANGE UP (ref 80–100)
PLATELET # BLD AUTO: 291 K/UL — SIGNIFICANT CHANGE UP (ref 150–400)
RBC # BLD: 4.1 M/UL — SIGNIFICANT CHANGE UP (ref 3.8–5.2)
RBC # FLD: 13.2 % — SIGNIFICANT CHANGE UP (ref 10.3–14.5)
WBC # BLD: 17.4 K/UL — HIGH (ref 3.8–10.5)
WBC # FLD AUTO: 17.4 K/UL — HIGH (ref 3.8–10.5)

## 2017-05-16 PROCEDURE — 77012 CT SCAN FOR NEEDLE BIOPSY: CPT | Mod: 26,59

## 2017-05-16 PROCEDURE — 88307 TISSUE EXAM BY PATHOLOGIST: CPT | Mod: 26

## 2017-05-16 PROCEDURE — 50200 RENAL BIOPSY PERQ: CPT | Mod: LT

## 2017-05-16 PROCEDURE — 88342 IMHCHEM/IMCYTCHM 1ST ANTB: CPT | Mod: 26

## 2017-05-16 PROCEDURE — 77013 CT GUIDE FOR TISSUE ABLATION: CPT | Mod: 26

## 2017-05-16 PROCEDURE — 99222 1ST HOSP IP/OBS MODERATE 55: CPT | Mod: AI

## 2017-05-16 PROCEDURE — 50593 PERC CRYO ABLATE RENAL TUM: CPT | Mod: LT

## 2017-05-16 RX ORDER — ONDANSETRON 8 MG/1
4 TABLET, FILM COATED ORAL ONCE
Qty: 0 | Refills: 0 | Status: DISCONTINUED | OUTPATIENT
Start: 2017-05-16 | End: 2017-05-16

## 2017-05-16 RX ORDER — ACETAMINOPHEN 500 MG
650 TABLET ORAL EVERY 6 HOURS
Qty: 0 | Refills: 0 | Status: DISCONTINUED | OUTPATIENT
Start: 2017-05-16 | End: 2017-05-17

## 2017-05-16 RX ORDER — SODIUM CHLORIDE 9 MG/ML
1000 INJECTION, SOLUTION INTRAVENOUS
Qty: 0 | Refills: 0 | Status: DISCONTINUED | OUTPATIENT
Start: 2017-05-16 | End: 2017-05-17

## 2017-05-16 RX ORDER — LORATADINE 10 MG/1
10 TABLET ORAL DAILY
Qty: 0 | Refills: 0 | Status: DISCONTINUED | OUTPATIENT
Start: 2017-05-16 | End: 2017-05-17

## 2017-05-16 RX ORDER — INSULIN LISPRO 100/ML
VIAL (ML) SUBCUTANEOUS
Qty: 0 | Refills: 0 | Status: DISCONTINUED | OUTPATIENT
Start: 2017-05-16 | End: 2017-05-17

## 2017-05-16 RX ORDER — TIOTROPIUM BROMIDE 18 UG/1
1 CAPSULE ORAL; RESPIRATORY (INHALATION)
Qty: 0 | Refills: 0 | COMMUNITY

## 2017-05-16 RX ORDER — GLUCAGON INJECTION, SOLUTION 0.5 MG/.1ML
1 INJECTION, SOLUTION SUBCUTANEOUS ONCE
Qty: 0 | Refills: 0 | Status: DISCONTINUED | OUTPATIENT
Start: 2017-05-16 | End: 2017-05-17

## 2017-05-16 RX ORDER — GABAPENTIN 400 MG/1
300 CAPSULE ORAL
Qty: 0 | Refills: 0 | Status: DISCONTINUED | OUTPATIENT
Start: 2017-05-16 | End: 2017-05-17

## 2017-05-16 RX ORDER — INSULIN LISPRO 100/ML
VIAL (ML) SUBCUTANEOUS AT BEDTIME
Qty: 0 | Refills: 0 | Status: DISCONTINUED | OUTPATIENT
Start: 2017-05-16 | End: 2017-05-17

## 2017-05-16 RX ORDER — ALBUTEROL 90 UG/1
3 AEROSOL, METERED ORAL
Qty: 0 | Refills: 0 | COMMUNITY

## 2017-05-16 RX ORDER — DEXTROSE 50 % IN WATER 50 %
25 SYRINGE (ML) INTRAVENOUS ONCE
Qty: 0 | Refills: 0 | Status: DISCONTINUED | OUTPATIENT
Start: 2017-05-16 | End: 2017-05-17

## 2017-05-16 RX ORDER — DEXTROSE 50 % IN WATER 50 %
1 SYRINGE (ML) INTRAVENOUS ONCE
Qty: 0 | Refills: 0 | Status: DISCONTINUED | OUTPATIENT
Start: 2017-05-16 | End: 2017-05-17

## 2017-05-16 RX ORDER — FLUTICASONE PROPIONATE 50 MCG
1 SPRAY, SUSPENSION NASAL
Qty: 0 | Refills: 0 | COMMUNITY

## 2017-05-16 RX ORDER — CANAGLIFLOZIN 100 MG/1
1 TABLET, FILM COATED ORAL
Qty: 0 | Refills: 0 | COMMUNITY

## 2017-05-16 RX ORDER — ATORVASTATIN CALCIUM 80 MG/1
40 TABLET, FILM COATED ORAL AT BEDTIME
Qty: 0 | Refills: 0 | Status: DISCONTINUED | OUTPATIENT
Start: 2017-05-16 | End: 2017-05-17

## 2017-05-16 RX ORDER — INSULIN GLARGINE 100 [IU]/ML
60 INJECTION, SOLUTION SUBCUTANEOUS AT BEDTIME
Qty: 0 | Refills: 0 | Status: DISCONTINUED | OUTPATIENT
Start: 2017-05-16 | End: 2017-05-17

## 2017-05-16 RX ORDER — HYDROMORPHONE HYDROCHLORIDE 2 MG/ML
0.25 INJECTION INTRAMUSCULAR; INTRAVENOUS; SUBCUTANEOUS
Qty: 0 | Refills: 0 | Status: DISCONTINUED | OUTPATIENT
Start: 2017-05-16 | End: 2017-05-16

## 2017-05-16 RX ORDER — METOPROLOL TARTRATE 50 MG
25 TABLET ORAL
Qty: 0 | Refills: 0 | Status: DISCONTINUED | OUTPATIENT
Start: 2017-05-16 | End: 2017-05-17

## 2017-05-16 RX ORDER — DEXTROSE 50 % IN WATER 50 %
12.5 SYRINGE (ML) INTRAVENOUS ONCE
Qty: 0 | Refills: 0 | Status: DISCONTINUED | OUTPATIENT
Start: 2017-05-16 | End: 2017-05-17

## 2017-05-16 RX ORDER — BUPROPION HYDROCHLORIDE 150 MG/1
200 TABLET, EXTENDED RELEASE ORAL DAILY
Qty: 0 | Refills: 0 | Status: DISCONTINUED | OUTPATIENT
Start: 2017-05-16 | End: 2017-05-17

## 2017-05-16 RX ORDER — LISINOPRIL 2.5 MG/1
5 TABLET ORAL DAILY
Qty: 0 | Refills: 0 | Status: DISCONTINUED | OUTPATIENT
Start: 2017-05-16 | End: 2017-05-17

## 2017-05-16 RX ORDER — GLIMEPIRIDE 1 MG
1 TABLET ORAL
Qty: 0 | Refills: 0 | COMMUNITY

## 2017-05-16 RX ADMIN — INSULIN GLARGINE 60 UNIT(S): 100 INJECTION, SOLUTION SUBCUTANEOUS at 21:32

## 2017-05-16 RX ADMIN — Medication 1: at 18:41

## 2017-05-16 RX ADMIN — BUPROPION HYDROCHLORIDE 200 MILLIGRAM(S): 150 TABLET, EXTENDED RELEASE ORAL at 18:22

## 2017-05-16 RX ADMIN — HYDROMORPHONE HYDROCHLORIDE 0.25 MILLIGRAM(S): 2 INJECTION INTRAMUSCULAR; INTRAVENOUS; SUBCUTANEOUS at 14:10

## 2017-05-16 RX ADMIN — LORATADINE 10 MILLIGRAM(S): 10 TABLET ORAL at 18:21

## 2017-05-16 RX ADMIN — Medication 25 MILLIGRAM(S): at 18:21

## 2017-05-16 RX ADMIN — HYDROMORPHONE HYDROCHLORIDE 0.25 MILLIGRAM(S): 2 INJECTION INTRAMUSCULAR; INTRAVENOUS; SUBCUTANEOUS at 13:55

## 2017-05-16 RX ADMIN — ATORVASTATIN CALCIUM 40 MILLIGRAM(S): 80 TABLET, FILM COATED ORAL at 21:32

## 2017-05-16 RX ADMIN — Medication 650 MILLIGRAM(S): at 22:50

## 2017-05-16 RX ADMIN — Medication 650 MILLIGRAM(S): at 18:23

## 2017-05-16 RX ADMIN — Medication 650 MILLIGRAM(S): at 22:19

## 2017-05-16 NOTE — H&P ADULT. - ASSESSMENT
60 yo woman with PMH of COPD, ARIK on CPAP, AS/MR s/p bioprosthetic MVR/AVR 2014, DM2 on insulin, HLD admitted s/p kidney mass cryoablation by IR.

## 2017-05-16 NOTE — PATIENT PROFILE ADULT. - TEACHING/LEARNING LEARNING PREFERENCES
written material/verbal instruction/computer/internet/individual instruction/pictorial/skill demonstration/audio/group instruction

## 2017-05-16 NOTE — H&P ADULT. - PROBLEM SELECTOR PLAN 1
S/p cryoablation. Check CBC stat now and in AM to monitor for bleeding. NO NSAIDS or pharmacologic DVT prophylaxis. Holding ASA. Likely d/c in am if hg stable.

## 2017-05-16 NOTE — H&P ADULT. - FAMILY HISTORY
Sibling  Still living? Yes, Estimated age: 51-60  Family history of hypertension, Age at diagnosis: Age Unknown     Father  Still living? No  Family history of cirrhosis of liver, Age at diagnosis: Age Unknown

## 2017-05-16 NOTE — PATIENT PROFILE ADULT. - NS TRANSFER PATIENT BELONGINGS
Clothing/Jewelry/Money (specify)/Other belongings/canvas bag, wallet credit cards, cash )( amount unkonwon)/Cell Phone/PDA (specify)

## 2017-05-16 NOTE — H&P ADULT. - HISTORY OF PRESENT ILLNESS
60 yo woman with PMH of COPD, ARIK on CPAP, AS/MR s/p bioprosthetic MVR/AVR 2014, DM2 on insulin, HLD admitted s/p IR procedure.  Pt reportedly had findings on a CT scan of a b/l kidney mass. The right one was cryoablated in March. Today she underwent cryoablation of the left mass. Denies SOB, dizziness, N/V. Admitted for observation for bleeding. Only complaint is of headache.

## 2017-05-17 ENCOUNTER — TRANSCRIPTION ENCOUNTER (OUTPATIENT)
Age: 62
End: 2017-05-17

## 2017-05-17 VITALS — WEIGHT: 251.33 LBS

## 2017-05-17 LAB
HCT VFR BLD CALC: 38.5 % — SIGNIFICANT CHANGE UP (ref 34.5–45)
HGB BLD-MCNC: 12.2 G/DL — SIGNIFICANT CHANGE UP (ref 11.5–15.5)
MCHC RBC-ENTMCNC: 30.9 PG — SIGNIFICANT CHANGE UP (ref 27–34)
MCHC RBC-ENTMCNC: 31.7 GM/DL — LOW (ref 32–36)
MCV RBC AUTO: 97.3 FL — SIGNIFICANT CHANGE UP (ref 80–100)
PLATELET # BLD AUTO: 268 K/UL — SIGNIFICANT CHANGE UP (ref 150–400)
RBC # BLD: 3.96 M/UL — SIGNIFICANT CHANGE UP (ref 3.8–5.2)
RBC # FLD: 13.2 % — SIGNIFICANT CHANGE UP (ref 10.3–14.5)
WBC # BLD: 20.4 K/UL — HIGH (ref 3.8–10.5)
WBC # FLD AUTO: 20.4 K/UL — HIGH (ref 3.8–10.5)

## 2017-05-17 PROCEDURE — 99239 HOSP IP/OBS DSCHRG MGMT >30: CPT

## 2017-05-17 PROCEDURE — 88342 IMHCHEM/IMCYTCHM 1ST ANTB: CPT

## 2017-05-17 PROCEDURE — 50200 RENAL BIOPSY PERQ: CPT

## 2017-05-17 PROCEDURE — 88307 TISSUE EXAM BY PATHOLOGIST: CPT

## 2017-05-17 PROCEDURE — 50593 PERC CRYO ABLATE RENAL TUM: CPT

## 2017-05-17 PROCEDURE — 85027 COMPLETE CBC AUTOMATED: CPT

## 2017-05-17 PROCEDURE — 77013 CT GUIDE FOR TISSUE ABLATION: CPT

## 2017-05-17 PROCEDURE — C2618: CPT

## 2017-05-17 PROCEDURE — 77012 CT SCAN FOR NEEDLE BIOPSY: CPT | Mod: 59

## 2017-05-17 RX ORDER — METOPROLOL TARTRATE 50 MG
25 TABLET ORAL
Qty: 0 | Refills: 0 | Status: DISCONTINUED | OUTPATIENT
Start: 2017-05-17 | End: 2017-05-17

## 2017-05-17 RX ORDER — ACETAMINOPHEN 500 MG
2 TABLET ORAL
Qty: 0 | Refills: 0 | COMMUNITY
Start: 2017-05-17

## 2017-05-17 RX ORDER — DESLORATADINE 5 MG/1
1 TABLET, FILM COATED ORAL
Qty: 0 | Refills: 0 | COMMUNITY

## 2017-05-17 RX ORDER — LORATADINE 10 MG/1
1 TABLET ORAL
Qty: 0 | Refills: 0 | COMMUNITY
Start: 2017-05-17

## 2017-05-17 RX ADMIN — BUPROPION HYDROCHLORIDE 200 MILLIGRAM(S): 150 TABLET, EXTENDED RELEASE ORAL at 12:01

## 2017-05-17 RX ADMIN — Medication 1: at 11:58

## 2017-05-17 RX ADMIN — Medication 25 MILLIGRAM(S): at 10:33

## 2017-05-17 RX ADMIN — LORATADINE 10 MILLIGRAM(S): 10 TABLET ORAL at 12:01

## 2017-05-17 RX ADMIN — LISINOPRIL 5 MILLIGRAM(S): 2.5 TABLET ORAL at 05:19

## 2017-05-17 NOTE — DISCHARGE NOTE ADULT - PATIENT PORTAL LINK FT
“You can access the FollowHealth Patient Portal, offered by [facility], by registering with the following website: [email or web address]”

## 2017-05-17 NOTE — DISCHARGE NOTE ADULT - CARE PROVIDER_API CALL
Waqar Landa), Diagnostic Radiology; [facility]  [address] [place] [id number]  Phone: [phone]  Fax: [phone]

## 2017-05-17 NOTE — DISCHARGE NOTE ADULT - CARE PLAN
Principal Discharge DX:	Status post cryoablation  Goal:	follow up with IR  Instructions for follow-up, activity and diet:	follow up with IR, regular diet, regular acitivity Principal Discharge DX:	Status post cryoablation  Goal:	follow up with IR  Instructions for follow-up, activity and diet:	follow up with IR in 2-3 months, regular diet, regular activity  Secondary Diagnosis:	COPD (chronic obstructive pulmonary disease)  Instructions for follow-up, activity and diet:	Call your Health Care provider upon arrival home to make a follow up appointment within one week.  Take all inhalers as prescribed by your Health Care Provider.  Take steroids as prescribed by your Health Care Provider.  If your cough increases infrequency and severity and/or you have shortness of breath or increased shortness of breath call your Health Care Provider.  If you develop fever, chills, night sweats, malaise, and/or change in mental status call your Health care Provider.  Nutrition is very important.  Eat small frequent meals.  Increase your activity as tolerated.  Do not stay in bed all day  Secondary Diagnosis:	Diabetes mellitus  Instructions for follow-up, activity and diet:	HgA1C this admission.  Make sure you get your HgA1c checked every three months.  If you take oral diabetes medications, check your blood glucose two times a day.  If you take insulin, check your blood glucose before meals and at bedtime.  It's important not to skip any meals.  Keep a log of your blood glucose results and always take it with you to your doctor appointments.  Keep a list of your current medications including injectables and over the counter medications and bring this medication list with you to all your doctor appointments.  If you have not seen your ophthalmologist this year call for appointment.  Check your feet daily for redness, sores, or openings. Do not self treat. If no improvement in two days call your primary care physician for an appointment.  Low blood sugar (hypoglycemia) is a blood sugar below 70mg/dl. Check your blood sugar if you feel signs/symptoms of hypoglycemia. If your blood sugar is below 70 take 15 grams of carbohydrates (ex 4 oz of apple juice, 3-4 glucose tablets, or 4-6 oz of regular soda) wait 15 minutes and repeat blood sugar to make sure it comes up above 70.  If your blood sugar is above 70 and you are due for a meal, have a meal.  If you are not due for a meal have a snack.  This snack helps keeps your blood sugar at a safe range.  Secondary Diagnosis:	Essential hypertension  Instructions for follow-up, activity and diet:	Low salt diet  Activity as tolerated.  Take all medication as prescribed.  Follow up with your medical doctor for routine blood pressure monitoring at your next visit.  Notify your doctor if you have any of the following symptoms:   Dizziness, Lightheadedness, Blurry vision, Headache, Chest pain, Shortness of breath

## 2017-05-17 NOTE — DISCHARGE NOTE ADULT - MEDICATION SUMMARY - MEDICATIONS TO TAKE
I will START or STAY ON the medications listed below when I get home from the hospital:    aspirin 325 mg oral tablet  -- 1 tab(s) by mouth once a day  -- Indication: For cad    acetaminophen 325 mg oral tablet  -- 2 tab(s) by mouth every 6 hours, As needed, Mild Pain (1 - 3)  -- Indication: For pain    lisinopril 5 mg oral tablet  -- 1 tab(s) by mouth once a day  -- Indication: For Essential hypertension    gabapentin 300 mg oral capsule  -- 1 cap(s) by mouth 2 times a day, As Needed  -- Indication: For neuropathy    Trulicity Pen 0.75 mg/0.5 mL subcutaneous solution  --  subcutaneous once a week  -- Indication: For Diabetes mellitus    insulin glargine 100 units/mL subcutaneous solution  -- 60 unit(s) subcutaneous once a day (at bedtime)  -- Indication: For Diabetes mellitus    glimepiride 4 mg oral tablet  -- 1 tab(s) by mouth once a day (at bedtime)  -- Indication: For Diabetes mellitus    loratadine 10 mg oral tablet  -- 1 tab(s) by mouth once a day  -- Indication: For Uncomplicated asthma, unspecified asthma severity    atorvastatin 40 mg oral tablet  -- 1 tab(s) by mouth once a day (at bedtime)  -- Indication: For hld    metoprolol tartrate 25 mg oral tablet  -- 0.5 tab(s) by mouth 2 times a day  -- Indication: For Essential hypertension    buPROPion 200 mg/12 hours oral tablet, extended release  -- 1 tab(s) by mouth once a day  -- Indication: For Smoking

## 2017-05-17 NOTE — DISCHARGE NOTE ADULT - CONDITIONS AT DISCHARGE
Pt. a/ox4, ambulatory, voiding, w/ left flank incision bandaid intact, denies pain, tolerating diet, and vss.

## 2017-05-17 NOTE — DISCHARGE NOTE ADULT - HOSPITAL COURSE
60 yo woman with PMH of COPD, ARIK on CPAP, AS/MR s/p bioprosthetic MVR/AVR 2014, DM2 on insulin, HLD admitted s/p IR procedure.  Pt reportedly had findings on a CT scan of a b/l kidney mass. The right one was cryoablated in March. Today she underwent cryoablation of the left mass. Denies SOB, dizziness, N/V. Admitted for observation for bleeding. CBC stable. IR cleared for discharge. Patient is to be discharged home with follow up with IR.

## 2017-05-17 NOTE — DISCHARGE NOTE ADULT - PLAN OF CARE
follow up with IR follow up with IR, regular diet, regular acitivity Call your Health Care provider upon arrival home to make a follow up appointment within one week.  Take all inhalers as prescribed by your Health Care Provider.  Take steroids as prescribed by your Health Care Provider.  If your cough increases infrequency and severity and/or you have shortness of breath or increased shortness of breath call your Health Care Provider.  If you develop fever, chills, night sweats, malaise, and/or change in mental status call your Health care Provider.  Nutrition is very important.  Eat small frequent meals.  Increase your activity as tolerated.  Do not stay in bed all day HgA1C this admission.  Make sure you get your HgA1c checked every three months.  If you take oral diabetes medications, check your blood glucose two times a day.  If you take insulin, check your blood glucose before meals and at bedtime.  It's important not to skip any meals.  Keep a log of your blood glucose results and always take it with you to your doctor appointments.  Keep a list of your current medications including injectables and over the counter medications and bring this medication list with you to all your doctor appointments.  If you have not seen your ophthalmologist this year call for appointment.  Check your feet daily for redness, sores, or openings. Do not self treat. If no improvement in two days call your primary care physician for an appointment.  Low blood sugar (hypoglycemia) is a blood sugar below 70mg/dl. Check your blood sugar if you feel signs/symptoms of hypoglycemia. If your blood sugar is below 70 take 15 grams of carbohydrates (ex 4 oz of apple juice, 3-4 glucose tablets, or 4-6 oz of regular soda) wait 15 minutes and repeat blood sugar to make sure it comes up above 70.  If your blood sugar is above 70 and you are due for a meal, have a meal.  If you are not due for a meal have a snack.  This snack helps keeps your blood sugar at a safe range. Low salt diet  Activity as tolerated.  Take all medication as prescribed.  Follow up with your medical doctor for routine blood pressure monitoring at your next visit.  Notify your doctor if you have any of the following symptoms:   Dizziness, Lightheadedness, Blurry vision, Headache, Chest pain, Shortness of breath follow up with IR in 2-3 months, regular diet, regular activity

## 2017-05-25 ENCOUNTER — APPOINTMENT (OUTPATIENT)
Dept: FAMILY MEDICINE | Facility: CLINIC | Age: 62
End: 2017-05-25

## 2017-05-25 VITALS
DIASTOLIC BLOOD PRESSURE: 80 MMHG | SYSTOLIC BLOOD PRESSURE: 126 MMHG | HEART RATE: 86 BPM | HEIGHT: 63.5 IN | OXYGEN SATURATION: 97 % | BODY MASS INDEX: 44.97 KG/M2 | WEIGHT: 257 LBS

## 2017-05-25 DIAGNOSIS — Z86.2 PERSONAL HISTORY OF DISEASES OF THE BLOOD AND BLOOD-FORMING ORGANS AND CERTAIN DISORDERS INVOLVING THE IMMUNE MECHANISM: ICD-10-CM

## 2017-05-25 DIAGNOSIS — Z86.19 PERSONAL HISTORY OF OTHER INFECTIOUS AND PARASITIC DISEASES: ICD-10-CM

## 2017-05-25 DIAGNOSIS — Z01.812 ENCOUNTER FOR PREPROCEDURAL LABORATORY EXAMINATION: ICD-10-CM

## 2017-05-25 DIAGNOSIS — C64.1 MALIGNANT NEOPLASM OF RIGHT KIDNEY, EXCEPT RENAL PELVIS: ICD-10-CM

## 2017-05-25 DIAGNOSIS — T14.90 INJURY, UNSPECIFIED: ICD-10-CM

## 2017-05-25 DIAGNOSIS — N28.89 OTHER SPECIFIED DISORDERS OF KIDNEY AND URETER: ICD-10-CM

## 2017-05-25 DIAGNOSIS — R05 COUGH: ICD-10-CM

## 2017-05-25 DIAGNOSIS — C64.2 MALIGNANT NEOPLASM OF LEFT KIDNEY, EXCEPT RENAL PELVIS: ICD-10-CM

## 2017-05-25 DIAGNOSIS — Z86.79 PERSONAL HISTORY OF OTHER DISEASES OF THE CIRCULATORY SYSTEM: ICD-10-CM

## 2017-05-29 PROBLEM — C64.1 CANCER OF KIDNEY, RIGHT: Status: RESOLVED | Noted: 2017-04-10 | Resolved: 2017-05-29

## 2017-05-29 PROBLEM — C64.2 CANCER OF KIDNEY, LEFT: Status: RESOLVED | Noted: 2017-04-10 | Resolved: 2017-05-29

## 2017-05-29 PROBLEM — N28.89 KIDNEY MASS: Status: RESOLVED | Noted: 2017-01-12 | Resolved: 2017-05-29

## 2017-05-29 PROBLEM — T14.90 INJURY: Status: ACTIVE | Noted: 2017-05-29

## 2017-05-29 PROBLEM — Z01.812 PRE-PROCEDURAL LABORATORY EXAMINATION: Status: RESOLVED | Noted: 2017-05-05 | Resolved: 2017-05-29

## 2017-07-20 ENCOUNTER — APPOINTMENT (OUTPATIENT)
Dept: FAMILY MEDICINE | Facility: CLINIC | Age: 62
End: 2017-07-20

## 2017-07-28 ENCOUNTER — OTHER (OUTPATIENT)
Age: 62
End: 2017-07-28

## 2017-08-14 ENCOUNTER — RX RENEWAL (OUTPATIENT)
Age: 62
End: 2017-08-14

## 2017-08-30 ENCOUNTER — APPOINTMENT (OUTPATIENT)
Dept: CT IMAGING | Facility: CLINIC | Age: 62
End: 2017-08-30
Payer: MEDICARE

## 2017-08-30 ENCOUNTER — OUTPATIENT (OUTPATIENT)
Dept: OUTPATIENT SERVICES | Facility: HOSPITAL | Age: 62
LOS: 1 days | End: 2017-08-30
Payer: MEDICARE

## 2017-08-30 DIAGNOSIS — Z95.2 PRESENCE OF PROSTHETIC HEART VALVE: Chronic | ICD-10-CM

## 2017-08-30 DIAGNOSIS — Z00.8 ENCOUNTER FOR OTHER GENERAL EXAMINATION: ICD-10-CM

## 2017-08-30 DIAGNOSIS — Z98.890 OTHER SPECIFIED POSTPROCEDURAL STATES: Chronic | ICD-10-CM

## 2017-08-30 PROCEDURE — 82565 ASSAY OF CREATININE: CPT

## 2017-08-30 PROCEDURE — 74177 CT ABD & PELVIS W/CONTRAST: CPT

## 2017-08-30 PROCEDURE — 74177 CT ABD & PELVIS W/CONTRAST: CPT | Mod: 26

## 2017-09-07 ENCOUNTER — OTHER (OUTPATIENT)
Age: 62
End: 2017-09-07

## 2017-10-02 ENCOUNTER — RX RENEWAL (OUTPATIENT)
Age: 62
End: 2017-10-02

## 2017-10-30 ENCOUNTER — APPOINTMENT (OUTPATIENT)
Dept: FAMILY MEDICINE | Facility: CLINIC | Age: 62
End: 2017-10-30
Payer: MEDICARE

## 2017-10-30 VITALS
DIASTOLIC BLOOD PRESSURE: 78 MMHG | HEIGHT: 63.5 IN | HEART RATE: 92 BPM | WEIGHT: 257 LBS | OXYGEN SATURATION: 97 % | BODY MASS INDEX: 44.97 KG/M2 | TEMPERATURE: 97.9 F | SYSTOLIC BLOOD PRESSURE: 130 MMHG

## 2017-10-30 DIAGNOSIS — I70.90 UNSPECIFIED ATHEROSCLEROSIS: ICD-10-CM

## 2017-10-30 DIAGNOSIS — Z23 ENCOUNTER FOR IMMUNIZATION: ICD-10-CM

## 2017-10-30 DIAGNOSIS — N18.3 CHRONIC KIDNEY DISEASE, STAGE 3 (MODERATE): ICD-10-CM

## 2017-10-30 PROCEDURE — 36415 COLL VENOUS BLD VENIPUNCTURE: CPT

## 2017-10-30 PROCEDURE — 90662 IIV NO PRSV INCREASED AG IM: CPT

## 2017-10-30 PROCEDURE — 99214 OFFICE O/P EST MOD 30 MIN: CPT | Mod: 25

## 2017-10-30 PROCEDURE — G0008: CPT

## 2017-10-30 RX ORDER — GABAPENTIN 600 MG/1
600 TABLET, COATED ORAL 3 TIMES DAILY
Qty: 1 | Refills: 3 | Status: ACTIVE | COMMUNITY
Start: 2017-04-10 | End: 1900-01-01

## 2017-11-02 LAB
25(OH)D3 SERPL-MCNC: 31.2 NG/ML
ALBUMIN SERPL ELPH-MCNC: 4.1 G/DL
ALP BLD-CCNC: 121 U/L
ALT SERPL-CCNC: 23 U/L
ANION GAP SERPL CALC-SCNC: 14 MMOL/L
AST SERPL-CCNC: 18 U/L
BASOPHILS # BLD AUTO: 0.05 K/UL
BASOPHILS NFR BLD AUTO: 0.4 %
BILIRUB SERPL-MCNC: 0.3 MG/DL
BUN SERPL-MCNC: 35 MG/DL
CALCIUM SERPL-MCNC: 9.6 MG/DL
CHLORIDE SERPL-SCNC: 105 MMOL/L
CHOLEST SERPL-MCNC: 178 MG/DL
CHOLEST/HDLC SERPL: 4.6 RATIO
CO2 SERPL-SCNC: 25 MMOL/L
CREAT SERPL-MCNC: 1.28 MG/DL
EOSINOPHIL # BLD AUTO: 0.15 K/UL
EOSINOPHIL NFR BLD AUTO: 1.3 %
GLUCOSE SERPL-MCNC: 88 MG/DL
HBA1C MFR BLD HPLC: 7.2 %
HCT VFR BLD CALC: 40.8 %
HDLC SERPL-MCNC: 39 MG/DL
HGB BLD-MCNC: 12.9 G/DL
IMM GRANULOCYTES NFR BLD AUTO: 0.3 %
LDLC SERPL CALC-MCNC: 92 MG/DL
LYMPHOCYTES # BLD AUTO: 3.14 K/UL
LYMPHOCYTES NFR BLD AUTO: 27.2 %
MAN DIFF?: NORMAL
MCHC RBC-ENTMCNC: 31.4 PG
MCHC RBC-ENTMCNC: 31.6 GM/DL
MCV RBC AUTO: 99.3 FL
MONOCYTES # BLD AUTO: 1.31 K/UL
MONOCYTES NFR BLD AUTO: 11.3 %
NEUTROPHILS # BLD AUTO: 6.87 K/UL
NEUTROPHILS NFR BLD AUTO: 59.5 %
PLATELET # BLD AUTO: 317 K/UL
POTASSIUM SERPL-SCNC: 5 MMOL/L
PROT SERPL-MCNC: 7.8 G/DL
RBC # BLD: 4.11 M/UL
RBC # FLD: 15.5 %
SODIUM SERPL-SCNC: 144 MMOL/L
TRIGL SERPL-MCNC: 233 MG/DL
TSH SERPL-ACNC: 3.23 UIU/ML
WBC # FLD AUTO: 11.56 K/UL

## 2017-11-06 ENCOUNTER — RX RENEWAL (OUTPATIENT)
Age: 62
End: 2017-11-06

## 2018-03-23 ENCOUNTER — OUTPATIENT (OUTPATIENT)
Dept: OUTPATIENT SERVICES | Facility: HOSPITAL | Age: 63
LOS: 1 days | End: 2018-03-23
Payer: MEDICARE

## 2018-03-23 ENCOUNTER — APPOINTMENT (OUTPATIENT)
Dept: MRI IMAGING | Facility: CLINIC | Age: 63
End: 2018-03-23
Payer: MEDICARE

## 2018-03-23 DIAGNOSIS — C64.9 MALIGNANT NEOPLASM OF UNSPECIFIED KIDNEY, EXCEPT RENAL PELVIS: ICD-10-CM

## 2018-03-23 DIAGNOSIS — Z98.890 OTHER SPECIFIED POSTPROCEDURAL STATES: Chronic | ICD-10-CM

## 2018-03-23 DIAGNOSIS — Z95.2 PRESENCE OF PROSTHETIC HEART VALVE: Chronic | ICD-10-CM

## 2018-03-23 PROCEDURE — A9585: CPT

## 2018-03-23 PROCEDURE — 82565 ASSAY OF CREATININE: CPT

## 2018-03-23 PROCEDURE — 74183 MRI ABD W/O CNTR FLWD CNTR: CPT | Mod: 26

## 2018-03-23 PROCEDURE — 74183 MRI ABD W/O CNTR FLWD CNTR: CPT

## 2018-03-23 PROCEDURE — 72197 MRI PELVIS W/O & W/DYE: CPT

## 2018-03-23 PROCEDURE — 72197 MRI PELVIS W/O & W/DYE: CPT | Mod: 26

## 2018-04-02 ENCOUNTER — MOBILE ON CALL (OUTPATIENT)
Age: 63
End: 2018-04-02

## 2018-04-20 ENCOUNTER — APPOINTMENT (OUTPATIENT)
Dept: INTERVENTIONAL RADIOLOGY/VASCULAR | Facility: CLINIC | Age: 63
End: 2018-04-20
Payer: MEDICARE

## 2018-04-20 DIAGNOSIS — R93.5 ABNORMAL FINDINGS ON DIAGNOSTIC IMAGING OF OTHER ABDOMINAL REGIONS, INCLUDING RETROPERITONEUM: ICD-10-CM

## 2018-04-20 PROCEDURE — 99215 OFFICE O/P EST HI 40 MIN: CPT

## 2018-04-20 RX ORDER — METOPROLOL SUCCINATE 50 MG/1
50 TABLET, EXTENDED RELEASE ORAL
Qty: 90 | Refills: 1 | Status: DISCONTINUED | COMMUNITY
Start: 2017-01-12 | End: 2018-04-20

## 2018-04-20 RX ORDER — CHROMIUM 200 MCG
1000 TABLET ORAL DAILY
Refills: 0 | Status: ACTIVE | COMMUNITY
Start: 2018-04-20

## 2018-04-20 RX ORDER — ALBUTEROL SULFATE 2.5 MG/3ML
(2.5 MG/3ML) SOLUTION RESPIRATORY (INHALATION)
Qty: 1 | Refills: 2 | Status: DISCONTINUED | COMMUNITY
Start: 2017-02-14 | End: 2018-04-20

## 2018-04-26 ENCOUNTER — EMERGENCY (EMERGENCY)
Facility: HOSPITAL | Age: 63
LOS: 1 days | Discharge: HOME | End: 2018-04-26
Attending: EMERGENCY MEDICINE | Admitting: EMERGENCY MEDICINE
Payer: MEDICARE

## 2018-04-26 ENCOUNTER — APPOINTMENT (OUTPATIENT)
Dept: PULMONOLOGY | Facility: CLINIC | Age: 63
End: 2018-04-26
Payer: MEDICARE

## 2018-04-26 ENCOUNTER — TRANSCRIPTION ENCOUNTER (OUTPATIENT)
Age: 63
End: 2018-04-26

## 2018-04-26 VITALS — BODY MASS INDEX: 41.5 KG/M2 | WEIGHT: 238 LBS

## 2018-04-26 VITALS — HEIGHT: 63 IN | WEIGHT: 238.1 LBS

## 2018-04-26 VITALS — SYSTOLIC BLOOD PRESSURE: 105 MMHG | DIASTOLIC BLOOD PRESSURE: 60 MMHG

## 2018-04-26 VITALS — HEART RATE: 92 BPM | OXYGEN SATURATION: 95 %

## 2018-04-26 DIAGNOSIS — Z95.2 PRESENCE OF PROSTHETIC HEART VALVE: Chronic | ICD-10-CM

## 2018-04-26 DIAGNOSIS — G47.33 OBSTRUCTIVE SLEEP APNEA (ADULT) (PEDIATRIC): ICD-10-CM

## 2018-04-26 DIAGNOSIS — Z98.890 OTHER SPECIFIED POSTPROCEDURAL STATES: Chronic | ICD-10-CM

## 2018-04-26 LAB
ALBUMIN SERPL ELPH-MCNC: 4.1 G/DL — SIGNIFICANT CHANGE UP (ref 3.3–5.2)
ALP SERPL-CCNC: 123 U/L — HIGH (ref 40–120)
ALT FLD-CCNC: 18 U/L — SIGNIFICANT CHANGE UP
ANION GAP SERPL CALC-SCNC: 13 MMOL/L — SIGNIFICANT CHANGE UP (ref 5–17)
APPEARANCE UR: ABNORMAL
AST SERPL-CCNC: 18 U/L — SIGNIFICANT CHANGE UP
BACTERIA # UR AUTO: ABNORMAL
BASOPHILS # BLD AUTO: 0 K/UL — SIGNIFICANT CHANGE UP (ref 0–0.2)
BASOPHILS NFR BLD AUTO: 0.2 % — SIGNIFICANT CHANGE UP (ref 0–2)
BILIRUB SERPL-MCNC: 0.4 MG/DL — SIGNIFICANT CHANGE UP (ref 0.4–2)
BILIRUB UR-MCNC: NEGATIVE — SIGNIFICANT CHANGE UP
BUN SERPL-MCNC: 43 MG/DL — HIGH (ref 8–20)
CALCIUM SERPL-MCNC: 9.4 MG/DL — SIGNIFICANT CHANGE UP (ref 8.6–10.2)
CHLORIDE SERPL-SCNC: 103 MMOL/L — SIGNIFICANT CHANGE UP (ref 98–107)
CO2 SERPL-SCNC: 26 MMOL/L — SIGNIFICANT CHANGE UP (ref 22–29)
COLOR SPEC: YELLOW — SIGNIFICANT CHANGE UP
CREAT SERPL-MCNC: 1.3 MG/DL — SIGNIFICANT CHANGE UP (ref 0.5–1.3)
DIFF PNL FLD: ABNORMAL
EOSINOPHIL # BLD AUTO: 0.2 K/UL — SIGNIFICANT CHANGE UP (ref 0–0.5)
EOSINOPHIL NFR BLD AUTO: 1.2 % — SIGNIFICANT CHANGE UP (ref 0–6)
EPI CELLS # UR: ABNORMAL
GLUCOSE SERPL-MCNC: 70 MG/DL — SIGNIFICANT CHANGE UP (ref 70–115)
GLUCOSE UR QL: NEGATIVE MG/DL — SIGNIFICANT CHANGE UP
HCT VFR BLD CALC: 38.7 % — SIGNIFICANT CHANGE UP (ref 37–47)
HGB BLD-MCNC: 12.7 G/DL — SIGNIFICANT CHANGE UP (ref 12–16)
KETONES UR-MCNC: NEGATIVE — SIGNIFICANT CHANGE UP
LEUKOCYTE ESTERASE UR-ACNC: ABNORMAL
LYMPHOCYTES # BLD AUTO: 27.1 % — SIGNIFICANT CHANGE UP (ref 20–55)
LYMPHOCYTES # BLD AUTO: 3.7 K/UL — SIGNIFICANT CHANGE UP (ref 1–4.8)
MCHC RBC-ENTMCNC: 31.3 PG — HIGH (ref 27–31)
MCHC RBC-ENTMCNC: 32.8 G/DL — SIGNIFICANT CHANGE UP (ref 32–36)
MCV RBC AUTO: 95.3 FL — SIGNIFICANT CHANGE UP (ref 81–99)
MONOCYTES # BLD AUTO: 1.7 K/UL — HIGH (ref 0–0.8)
MONOCYTES NFR BLD AUTO: 12.6 % — HIGH (ref 3–10)
NEUTROPHILS # BLD AUTO: 8.1 K/UL — HIGH (ref 1.8–8)
NEUTROPHILS NFR BLD AUTO: 58.8 % — SIGNIFICANT CHANGE UP (ref 37–73)
NITRITE UR-MCNC: NEGATIVE — SIGNIFICANT CHANGE UP
PH UR: 6 — SIGNIFICANT CHANGE UP (ref 5–8)
PLAT MORPH BLD: NORMAL — SIGNIFICANT CHANGE UP
PLATELET # BLD AUTO: 317 K/UL — SIGNIFICANT CHANGE UP (ref 150–400)
POTASSIUM SERPL-MCNC: 4.2 MMOL/L — SIGNIFICANT CHANGE UP (ref 3.5–5.3)
POTASSIUM SERPL-SCNC: 4.2 MMOL/L — SIGNIFICANT CHANGE UP (ref 3.5–5.3)
PROT SERPL-MCNC: 7.8 G/DL — SIGNIFICANT CHANGE UP (ref 6.6–8.7)
PROT UR-MCNC: 30 MG/DL
RBC # BLD: 4.06 M/UL — LOW (ref 4.4–5.2)
RBC # FLD: 14.3 % — SIGNIFICANT CHANGE UP (ref 11–15.6)
RBC BLD AUTO: NORMAL — SIGNIFICANT CHANGE UP
RBC CASTS # UR COMP ASSIST: ABNORMAL /HPF (ref 0–4)
SODIUM SERPL-SCNC: 142 MMOL/L — SIGNIFICANT CHANGE UP (ref 135–145)
SP GR SPEC: 1.02 — SIGNIFICANT CHANGE UP (ref 1.01–1.02)
UROBILINOGEN FLD QL: NEGATIVE MG/DL — SIGNIFICANT CHANGE UP
WBC # BLD: 13.8 K/UL — HIGH (ref 4.8–10.8)
WBC # FLD AUTO: 13.8 K/UL — HIGH (ref 4.8–10.8)
WBC UR QL: >50

## 2018-04-26 PROCEDURE — 93971 EXTREMITY STUDY: CPT | Mod: 26,RT

## 2018-04-26 PROCEDURE — 94010 BREATHING CAPACITY TEST: CPT

## 2018-04-26 PROCEDURE — 99215 OFFICE O/P EST HI 40 MIN: CPT | Mod: 25

## 2018-04-26 PROCEDURE — 99284 EMERGENCY DEPT VISIT MOD MDM: CPT | Mod: GC

## 2018-04-26 RX ORDER — SODIUM CHLORIDE 9 MG/ML
1000 INJECTION INTRAMUSCULAR; INTRAVENOUS; SUBCUTANEOUS ONCE
Qty: 0 | Refills: 0 | Status: COMPLETED | OUTPATIENT
Start: 2018-04-26 | End: 2018-04-26

## 2018-04-26 RX ORDER — GABAPENTIN 400 MG/1
1 CAPSULE ORAL
Qty: 0 | Refills: 0 | COMMUNITY

## 2018-04-26 RX ORDER — METOPROLOL TARTRATE 50 MG
0.5 TABLET ORAL
Qty: 0 | Refills: 0 | COMMUNITY

## 2018-04-26 RX ORDER — DULAGLUTIDE 4.5 MG/.5ML
0 INJECTION, SOLUTION SUBCUTANEOUS
Qty: 0 | Refills: 0 | COMMUNITY

## 2018-04-26 RX ORDER — CEFTRIAXONE 500 MG/1
1 INJECTION, POWDER, FOR SOLUTION INTRAMUSCULAR; INTRAVENOUS ONCE
Qty: 0 | Refills: 0 | Status: DISCONTINUED | OUTPATIENT
Start: 2018-04-26 | End: 2018-04-26

## 2018-04-26 RX ORDER — CEFTRIAXONE 500 MG/1
1000 INJECTION, POWDER, FOR SOLUTION INTRAMUSCULAR; INTRAVENOUS ONCE
Qty: 0 | Refills: 0 | Status: COMPLETED | OUTPATIENT
Start: 2018-04-26 | End: 2018-04-26

## 2018-04-26 RX ORDER — ASPIRIN/CALCIUM CARB/MAGNESIUM 324 MG
1 TABLET ORAL
Qty: 0 | Refills: 0 | COMMUNITY

## 2018-04-26 RX ADMIN — SODIUM CHLORIDE 1000 MILLILITER(S): 9 INJECTION INTRAMUSCULAR; INTRAVENOUS; SUBCUTANEOUS at 21:11

## 2018-04-26 RX ADMIN — CEFTRIAXONE 1000 MILLIGRAM(S): 500 INJECTION, POWDER, FOR SOLUTION INTRAMUSCULAR; INTRAVENOUS at 23:22

## 2018-04-26 NOTE — ED ADULT NURSE NOTE - OBJECTIVE STATEMENT
Patient arrived to ED today with c/o right flank pain that radiates to her abdomen.  Patient states symptoms intermittently for the past 2 months.

## 2018-04-26 NOTE — ED ADULT TRIAGE NOTE - CHIEF COMPLAINT QUOTE
pt c/o right flank pain which radiates into RLQ abd pain and with pain shooting down into right leg. pt states pain x 8 months worse over the past few days. Pt denies n/v/d, fevers, chills CP, SOB.

## 2018-04-26 NOTE — ED PROVIDER NOTE - MEDICAL DECISION MAKING DETAILS
62 yof with PMH of b/l renal masses, CKD, CHF, COPD, presents to ED with complaint of worsening lower right sided groin pain.   Routine labs, cbc/cmp/ua. Will review previous MRI Abdomen, consider repeat CT Abdomen/Pelvis. Will reassess

## 2018-04-26 NOTE — ED ADULT NURSE REASSESSMENT NOTE - NS ED NURSE REASSESS COMMENT FT1
report received @ 17:40, drinking contrast , pt resting comfortably in no distress. pending further orders

## 2018-04-26 NOTE — ED PROVIDER NOTE - OBJECTIVE STATEMENT
62 yof with PMH of b/l renal masses, CKD, CHF, COPD, presents to ED with complaint of worsening lower right sided groin pain. Patient states that approx 8 weeks ago, pain began that is described as 5/10 severity with intermittent exacerbations to 10/10 that is sharp in character. Pain located in right groin area that radiates down right leg, across lower abdomen to LLQ and around to her right back. Describes pain as if "pulling groin muscle" but denies any inciting event or trauma. Worse on movement or lifting of right leg with associated difficulty ambulating secondary to pain. No associated fever, chills, chest pain, SOB, N/V/D/C, dark or bloody stools, dysuria, hematuria, numbness, weakness.

## 2018-04-26 NOTE — ED PROVIDER NOTE - CHPI ED SYMPTOMS NEG
no fever/no diarrhea/no nausea/no blood in stool/no dysuria/no vomiting/no burning urination/no chills/no hematuria

## 2018-04-26 NOTE — ED PROVIDER NOTE - ATTENDING CONTRIBUTION TO CARE
Dr. Freedman: I have personally seen and examined this patient at the bedside. I have fully participated in the care of this patient. I have reviewed all pertinent clinical information, including history, physical exam, plan and the Resident's note and agree except as noted.     62F with b/l renal masses, CKD, HTN, HLD, DM presenting with 8 weeks of R groin pain, pain radiating around the R side of her back and occasionally shooting down her anterior R thigh. Pt denies fevers/chills/ vomiting/diarrhea/dysuria/urinary frequent/ urinary retention/ numbness/tingling/ bowel or bladder incontinence.  Pain is worse with movement and worse w/ ambulation.  Pt had recent MRI abdomen/ pelvis +/- contrast not showing any pathology in pelvis.  She has another pending MRI for Saturday ordered by PMD but decided to come into ER for evaluation. States that initially her PMD diagnosed her w/ pulled muscle but pain never resolved.     VS:  unremarkable    GEN - NAD; well appearing; A+O x3   HEAD - NC/AT     ENT - PEERL, EOMI, mucous membranes  moist , no discharge      NECK: Neck supple, non-tender without lymphadenopathy, no masses, no JVD  PULM - CTA b/l,  symmetric breath sounds  COR -  normal heart sounds    ABD - , ND, NT, soft, no guarding, no rebound, no masses  .  R groin nontender  BACK - no CVA tenderness, Mild TTP at L5/S1.   EXTREMS - no edema, no deformity, warm and well perfused    SKIN - no rash or bruising      NEUROLOGIC - alert, CN 2-12 intact, sensation nl, motor 5/5 RUE/LUE/RLE/LLE.  Pt ambulatory with slight antalgic gait on R side    I discussed case w/ Dr. Stephen (radiology) to ask about best studies for evaluating pelvis directed;  he reviewed MRIs and did not see any osseous abnormality in the R pelvis/ hip. He recommended CT with PO/ IV contrast and stated he could do bony reconstruction of pelvis. I discussed this w/ patient and daughter and recommended that regardless patient should keep appointment for MRI on Saturday.

## 2018-04-27 VITALS
TEMPERATURE: 97 F | HEART RATE: 76 BPM | DIASTOLIC BLOOD PRESSURE: 67 MMHG | OXYGEN SATURATION: 95 % | RESPIRATION RATE: 17 BRPM | SYSTOLIC BLOOD PRESSURE: 113 MMHG

## 2018-04-27 LAB
CULTURE RESULTS: NO GROWTH — SIGNIFICANT CHANGE UP
SPECIMEN SOURCE: SIGNIFICANT CHANGE UP

## 2018-04-27 PROCEDURE — 74177 CT ABD & PELVIS W/CONTRAST: CPT | Mod: 26

## 2018-04-27 PROCEDURE — 81001 URINALYSIS AUTO W/SCOPE: CPT

## 2018-04-27 PROCEDURE — 93926 LOWER EXTREMITY STUDY: CPT

## 2018-04-27 PROCEDURE — 74177 CT ABD & PELVIS W/CONTRAST: CPT

## 2018-04-27 PROCEDURE — 87086 URINE CULTURE/COLONY COUNT: CPT

## 2018-04-27 PROCEDURE — 93971 EXTREMITY STUDY: CPT

## 2018-04-27 PROCEDURE — 36415 COLL VENOUS BLD VENIPUNCTURE: CPT

## 2018-04-27 PROCEDURE — 72131 CT LUMBAR SPINE W/O DYE: CPT | Mod: 26

## 2018-04-27 PROCEDURE — 99284 EMERGENCY DEPT VISIT MOD MDM: CPT | Mod: 25

## 2018-04-27 PROCEDURE — 80053 COMPREHEN METABOLIC PANEL: CPT

## 2018-04-27 PROCEDURE — 85027 COMPLETE CBC AUTOMATED: CPT

## 2018-04-27 PROCEDURE — 96374 THER/PROPH/DIAG INJ IV PUSH: CPT | Mod: XU

## 2018-04-27 RX ORDER — OXYCODONE AND ACETAMINOPHEN 5; 325 MG/1; MG/1
2 TABLET ORAL ONCE
Qty: 0 | Refills: 0 | Status: DISCONTINUED | OUTPATIENT
Start: 2018-04-27 | End: 2018-04-27

## 2018-04-27 RX ORDER — OXYCODONE HYDROCHLORIDE 5 MG/1
5 TABLET ORAL ONCE
Qty: 0 | Refills: 0 | Status: DISCONTINUED | OUTPATIENT
Start: 2018-04-27 | End: 2018-04-27

## 2018-04-27 RX ADMIN — OXYCODONE AND ACETAMINOPHEN 2 TABLET(S): 5; 325 TABLET ORAL at 02:24

## 2018-04-27 RX ADMIN — OXYCODONE HYDROCHLORIDE 5 MILLIGRAM(S): 5 TABLET ORAL at 00:33

## 2018-04-28 ENCOUNTER — APPOINTMENT (OUTPATIENT)
Dept: MRI IMAGING | Facility: CLINIC | Age: 63
End: 2018-04-28

## 2018-04-28 ENCOUNTER — APPOINTMENT (OUTPATIENT)
Dept: MAMMOGRAPHY | Facility: CLINIC | Age: 63
End: 2018-04-28

## 2018-04-30 ENCOUNTER — APPOINTMENT (OUTPATIENT)
Dept: FAMILY MEDICINE | Facility: CLINIC | Age: 63
End: 2018-04-30
Payer: MEDICARE

## 2018-04-30 ENCOUNTER — NON-APPOINTMENT (OUTPATIENT)
Age: 63
End: 2018-04-30

## 2018-04-30 ENCOUNTER — APPOINTMENT (OUTPATIENT)
Dept: CARDIOLOGY | Facility: CLINIC | Age: 63
End: 2018-04-30
Payer: MEDICARE

## 2018-04-30 VITALS
DIASTOLIC BLOOD PRESSURE: 54 MMHG | HEIGHT: 63.5 IN | OXYGEN SATURATION: 92 % | HEART RATE: 74 BPM | SYSTOLIC BLOOD PRESSURE: 95 MMHG

## 2018-04-30 VITALS
WEIGHT: 238 LBS | BODY MASS INDEX: 41.65 KG/M2 | HEART RATE: 84 BPM | SYSTOLIC BLOOD PRESSURE: 130 MMHG | HEIGHT: 63.5 IN | DIASTOLIC BLOOD PRESSURE: 70 MMHG

## 2018-04-30 DIAGNOSIS — R06.09 OTHER FORMS OF DYSPNEA: ICD-10-CM

## 2018-04-30 DIAGNOSIS — Z01.810 ENCOUNTER FOR PREPROCEDURAL CARDIOVASCULAR EXAMINATION: ICD-10-CM

## 2018-04-30 DIAGNOSIS — Z95.2 PRESENCE OF PROSTHETIC HEART VALVE: ICD-10-CM

## 2018-04-30 PROCEDURE — 99214 OFFICE O/P EST MOD 30 MIN: CPT

## 2018-04-30 PROCEDURE — 93000 ELECTROCARDIOGRAM COMPLETE: CPT

## 2018-04-30 RX ORDER — BENZONATATE 100 MG/1
100 CAPSULE ORAL
Refills: 0 | Status: DISCONTINUED | COMMUNITY
Start: 2018-04-20 | End: 2018-04-30

## 2018-04-30 RX ORDER — METOPROLOL TARTRATE 25 MG/1
25 TABLET, FILM COATED ORAL TWICE DAILY
Refills: 0 | Status: DISCONTINUED | COMMUNITY
Start: 2018-04-20 | End: 2018-04-30

## 2018-04-30 RX ORDER — ONDANSETRON 4 MG/1
4 TABLET, ORALLY DISINTEGRATING ORAL EVERY 8 HOURS
Refills: 0 | Status: DISCONTINUED | COMMUNITY
Start: 2018-04-20 | End: 2018-04-30

## 2018-04-30 RX ORDER — TAMSULOSIN HYDROCHLORIDE 0.4 MG/1
0.4 CAPSULE ORAL DAILY
Refills: 0 | Status: DISCONTINUED | COMMUNITY
Start: 2018-04-20 | End: 2018-04-30

## 2018-05-01 ENCOUNTER — APPOINTMENT (OUTPATIENT)
Dept: UROLOGY | Facility: CLINIC | Age: 63
End: 2018-05-01
Payer: MEDICARE

## 2018-05-01 PROCEDURE — 99214 OFFICE O/P EST MOD 30 MIN: CPT

## 2018-05-02 ENCOUNTER — OUTPATIENT (OUTPATIENT)
Dept: OUTPATIENT SERVICES | Facility: HOSPITAL | Age: 63
LOS: 1 days | Discharge: HOME | End: 2018-05-02
Payer: MEDICARE

## 2018-05-02 DIAGNOSIS — Z95.2 PRESENCE OF PROSTHETIC HEART VALVE: Chronic | ICD-10-CM

## 2018-05-02 DIAGNOSIS — Z98.890 OTHER SPECIFIED POSTPROCEDURAL STATES: Chronic | ICD-10-CM

## 2018-05-03 ENCOUNTER — APPOINTMENT (OUTPATIENT)
Dept: RADIATION ONCOLOGY | Facility: CLINIC | Age: 63
End: 2018-05-03
Payer: MEDICARE

## 2018-05-03 VITALS
DIASTOLIC BLOOD PRESSURE: 62 MMHG | OXYGEN SATURATION: 96 % | HEART RATE: 61 BPM | BODY MASS INDEX: 41.5 KG/M2 | WEIGHT: 238 LBS | RESPIRATION RATE: 16 BRPM | TEMPERATURE: 98.7 F | SYSTOLIC BLOOD PRESSURE: 101 MMHG

## 2018-05-03 PROCEDURE — 77263 THER RADIOLOGY TX PLNG CPLX: CPT

## 2018-05-03 PROCEDURE — 99205 OFFICE O/P NEW HI 60 MIN: CPT | Mod: 25

## 2018-05-03 PROCEDURE — 77290 THER RAD SIMULAJ FIELD CPLX: CPT | Mod: 26

## 2018-05-03 PROCEDURE — 77334 RADIATION TREATMENT AID(S): CPT | Mod: 26

## 2018-05-04 ENCOUNTER — OUTPATIENT (OUTPATIENT)
Dept: OUTPATIENT SERVICES | Facility: HOSPITAL | Age: 63
LOS: 1 days | Discharge: HOME | End: 2018-05-04

## 2018-05-04 DIAGNOSIS — Z95.2 PRESENCE OF PROSTHETIC HEART VALVE: Chronic | ICD-10-CM

## 2018-05-04 DIAGNOSIS — C64.9 MALIGNANT NEOPLASM OF UNSPECIFIED KIDNEY, EXCEPT RENAL PELVIS: ICD-10-CM

## 2018-05-04 DIAGNOSIS — Z98.890 OTHER SPECIFIED POSTPROCEDURAL STATES: Chronic | ICD-10-CM

## 2018-05-04 PROCEDURE — 77334 RADIATION TREATMENT AID(S): CPT | Mod: 26

## 2018-05-04 PROCEDURE — 77307 TELETHX ISODOSE PLAN CPLX: CPT | Mod: 26

## 2018-05-07 ENCOUNTER — APPOINTMENT (OUTPATIENT)
Dept: HEMATOLOGY ONCOLOGY | Facility: CLINIC | Age: 63
End: 2018-05-07
Payer: MEDICARE

## 2018-05-07 ENCOUNTER — RESULT REVIEW (OUTPATIENT)
Age: 63
End: 2018-05-07

## 2018-05-07 ENCOUNTER — OUTPATIENT (OUTPATIENT)
Dept: OUTPATIENT SERVICES | Facility: HOSPITAL | Age: 63
LOS: 1 days | Discharge: HOME | End: 2018-05-07

## 2018-05-07 VITALS
BODY MASS INDEX: 41.65 KG/M2 | TEMPERATURE: 97.9 F | OXYGEN SATURATION: 94 % | HEART RATE: 95 BPM | WEIGHT: 238 LBS | SYSTOLIC BLOOD PRESSURE: 102 MMHG | DIASTOLIC BLOOD PRESSURE: 64 MMHG | HEIGHT: 63.5 IN

## 2018-05-07 DIAGNOSIS — Z95.2 PRESENCE OF PROSTHETIC HEART VALVE: Chronic | ICD-10-CM

## 2018-05-07 DIAGNOSIS — Z98.890 OTHER SPECIFIED POSTPROCEDURAL STATES: Chronic | ICD-10-CM

## 2018-05-07 DIAGNOSIS — Z87.891 PERSONAL HISTORY OF NICOTINE DEPENDENCE: ICD-10-CM

## 2018-05-07 DIAGNOSIS — C64.9 MALIGNANT NEOPLASM OF UNSPECIFIED KIDNEY, EXCEPT RENAL PELVIS: ICD-10-CM

## 2018-05-07 LAB
BASOPHILS # BLD AUTO: 0.1 K/UL — SIGNIFICANT CHANGE UP (ref 0–0.2)
BASOPHILS NFR BLD AUTO: 0.6 % — SIGNIFICANT CHANGE UP (ref 0–2)
EOSINOPHIL # BLD AUTO: 0.1 K/UL — SIGNIFICANT CHANGE UP (ref 0–0.5)
EOSINOPHIL NFR BLD AUTO: 0.6 % — SIGNIFICANT CHANGE UP (ref 0–6)
HCT VFR BLD CALC: 42.9 % — SIGNIFICANT CHANGE UP (ref 34.5–45)
HGB BLD-MCNC: 14.3 G/DL — SIGNIFICANT CHANGE UP (ref 11.5–15.5)
INR PPP: 1.13 RATIO
LYMPHOCYTES # BLD AUTO: 25.1 % — SIGNIFICANT CHANGE UP (ref 13–44)
LYMPHOCYTES # BLD AUTO: 3 K/UL — SIGNIFICANT CHANGE UP (ref 1–3.3)
MCHC RBC-ENTMCNC: 31.6 PG — SIGNIFICANT CHANGE UP (ref 27–34)
MCHC RBC-ENTMCNC: 33.2 GM/DL — SIGNIFICANT CHANGE UP (ref 32–36)
MCV RBC AUTO: 95 FL — SIGNIFICANT CHANGE UP (ref 80–100)
MONOCYTES # BLD AUTO: 1 K/UL — HIGH (ref 0–0.9)
MONOCYTES NFR BLD AUTO: 8.2 % — SIGNIFICANT CHANGE UP (ref 2–14)
NEUTROPHILS # BLD AUTO: 8 K/UL — HIGH (ref 1.8–7.4)
NEUTROPHILS NFR BLD AUTO: 65.7 % — SIGNIFICANT CHANGE UP (ref 43–77)
PLATELET # BLD AUTO: 431 K/UL — HIGH (ref 150–400)
PT BLD: 12.8 SEC
RBC # BLD: 4.51 M/UL — SIGNIFICANT CHANGE UP (ref 3.8–5.2)
RBC # FLD: 13.1 % — SIGNIFICANT CHANGE UP (ref 10.3–14.5)
WBC # BLD: 12.1 K/UL — HIGH (ref 3.8–10.5)
WBC # FLD AUTO: 12.1 K/UL — HIGH (ref 3.8–10.5)

## 2018-05-07 PROCEDURE — 99205 OFFICE O/P NEW HI 60 MIN: CPT

## 2018-05-08 LAB
ALBUMIN SERPL ELPH-MCNC: 4.3 G/DL
ALP BLD-CCNC: 151 U/L
ALT SERPL-CCNC: 38 U/L
ANION GAP SERPL CALC-SCNC: 17 MMOL/L
AST SERPL-CCNC: 26 U/L
BILIRUB SERPL-MCNC: 0.4 MG/DL
BUN SERPL-MCNC: 23 MG/DL
CALCIUM SERPL-MCNC: 10.4 MG/DL
CHLORIDE SERPL-SCNC: 100 MMOL/L
CO2 SERPL-SCNC: 22 MMOL/L
CREAT SERPL-MCNC: 1.07 MG/DL
GLUCOSE SERPL-MCNC: 115 MG/DL
HBV CORE IGG+IGM SER QL: NONREACTIVE
HBV SURFACE AB SER QL: NONREACTIVE
HBV SURFACE AG SER QL: NONREACTIVE
HCV AB SER QL: NONREACTIVE
HCV S/CO RATIO: 0.33 S/CO
MAGNESIUM SERPL-MCNC: 1.9 MG/DL
POTASSIUM SERPL-SCNC: 4.8 MMOL/L
PROT SERPL-MCNC: 8.1 G/DL
SODIUM SERPL-SCNC: 139 MMOL/L

## 2018-05-09 ENCOUNTER — FORM ENCOUNTER (OUTPATIENT)
Age: 63
End: 2018-05-09

## 2018-05-10 ENCOUNTER — FORM ENCOUNTER (OUTPATIENT)
Age: 63
End: 2018-05-10

## 2018-05-10 ENCOUNTER — APPOINTMENT (OUTPATIENT)
Dept: MRI IMAGING | Facility: CLINIC | Age: 63
End: 2018-05-10
Payer: MEDICARE

## 2018-05-10 ENCOUNTER — APPOINTMENT (OUTPATIENT)
Dept: NUCLEAR MEDICINE | Facility: CLINIC | Age: 63
End: 2018-05-10
Payer: MEDICARE

## 2018-05-10 ENCOUNTER — OUTPATIENT (OUTPATIENT)
Dept: OUTPATIENT SERVICES | Facility: HOSPITAL | Age: 63
LOS: 1 days | End: 2018-05-10

## 2018-05-10 DIAGNOSIS — Z95.2 PRESENCE OF PROSTHETIC HEART VALVE: Chronic | ICD-10-CM

## 2018-05-10 DIAGNOSIS — C64.9 MALIGNANT NEOPLASM OF UNSPECIFIED KIDNEY, EXCEPT RENAL PELVIS: ICD-10-CM

## 2018-05-10 DIAGNOSIS — Z98.890 OTHER SPECIFIED POSTPROCEDURAL STATES: Chronic | ICD-10-CM

## 2018-05-10 PROCEDURE — 70553 MRI BRAIN STEM W/O & W/DYE: CPT | Mod: 26

## 2018-05-10 PROCEDURE — 78815 PET IMAGE W/CT SKULL-THIGH: CPT | Mod: 26,PS

## 2018-05-10 RX ORDER — SODIUM CHLORIDE 9 MG/ML
3 INJECTION INTRAMUSCULAR; INTRAVENOUS; SUBCUTANEOUS ONCE
Qty: 0 | Refills: 0 | Status: DISCONTINUED | OUTPATIENT
Start: 2018-05-11 | End: 2018-05-26

## 2018-05-10 NOTE — ASU PATIENT PROFILE, ADULT - TEACHING/LEARNING LEARNING PREFERENCES
skill demonstration/computer/internet/written material/audio/verbal instruction/individual instruction/pictorial/group instruction

## 2018-05-10 NOTE — ASU PATIENT PROFILE, ADULT - LEARNING ASSESSMENT (PATIENT) ADDITIONAL COMMENTS
Telephone update on 5/10/18 Patient verbalized understanding of all instructions including pain scale

## 2018-05-11 ENCOUNTER — RESULT REVIEW (OUTPATIENT)
Age: 63
End: 2018-05-11

## 2018-05-11 ENCOUNTER — OUTPATIENT (OUTPATIENT)
Dept: OUTPATIENT SERVICES | Facility: HOSPITAL | Age: 63
LOS: 1 days | End: 2018-05-11
Payer: MEDICARE

## 2018-05-11 VITALS — WEIGHT: 231.93 LBS | HEIGHT: 63.5 IN

## 2018-05-11 DIAGNOSIS — R10.9 UNSPECIFIED ABDOMINAL PAIN: ICD-10-CM

## 2018-05-11 DIAGNOSIS — Z95.2 PRESENCE OF PROSTHETIC HEART VALVE: Chronic | ICD-10-CM

## 2018-05-11 DIAGNOSIS — Z98.890 OTHER SPECIFIED POSTPROCEDURAL STATES: Chronic | ICD-10-CM

## 2018-05-11 LAB — APTT BLD: 33.5 SEC — SIGNIFICANT CHANGE UP (ref 27.5–37.4)

## 2018-05-11 PROCEDURE — 20225 BONE BIOPSY TROCAR/NDL DEEP: CPT | Mod: RT

## 2018-05-11 PROCEDURE — 77012 CT SCAN FOR NEEDLE BIOPSY: CPT

## 2018-05-11 PROCEDURE — 85730 THROMBOPLASTIN TIME PARTIAL: CPT

## 2018-05-11 PROCEDURE — 88341 IMHCHEM/IMCYTCHM EA ADD ANTB: CPT | Mod: 26

## 2018-05-11 PROCEDURE — 88305 TISSUE EXAM BY PATHOLOGIST: CPT

## 2018-05-11 PROCEDURE — 88342 IMHCHEM/IMCYTCHM 1ST ANTB: CPT

## 2018-05-11 PROCEDURE — 77012 CT SCAN FOR NEEDLE BIOPSY: CPT | Mod: 26

## 2018-05-11 PROCEDURE — 36415 COLL VENOUS BLD VENIPUNCTURE: CPT

## 2018-05-11 PROCEDURE — 88342 IMHCHEM/IMCYTCHM 1ST ANTB: CPT | Mod: 26

## 2018-05-11 PROCEDURE — 88341 IMHCHEM/IMCYTCHM EA ADD ANTB: CPT

## 2018-05-11 PROCEDURE — 88311 DECALCIFY TISSUE: CPT

## 2018-05-11 PROCEDURE — 88305 TISSUE EXAM BY PATHOLOGIST: CPT | Mod: 26

## 2018-05-11 PROCEDURE — 88311 DECALCIFY TISSUE: CPT | Mod: 26

## 2018-05-11 RX ORDER — ACETAMINOPHEN 500 MG
1000 TABLET ORAL ONCE
Qty: 0 | Refills: 0 | Status: DISCONTINUED | OUTPATIENT
Start: 2018-05-11 | End: 2018-05-26

## 2018-05-11 RX ORDER — CHOLECALCIFEROL (VITAMIN D3) 125 MCG
1 CAPSULE ORAL
Qty: 0 | Refills: 0 | COMMUNITY

## 2018-05-11 RX ORDER — INSULIN GLARGINE 100 [IU]/ML
0 INJECTION, SOLUTION SUBCUTANEOUS
Qty: 0 | Refills: 0 | COMMUNITY

## 2018-05-11 RX ORDER — METOPROLOL TARTRATE 50 MG
1 TABLET ORAL
Qty: 0 | Refills: 0 | COMMUNITY

## 2018-05-11 RX ORDER — ALBUTEROL 90 UG/1
2 AEROSOL, METERED ORAL
Qty: 0 | Refills: 0 | COMMUNITY

## 2018-05-11 RX ORDER — BUPROPION HYDROCHLORIDE 150 MG/1
1 TABLET, EXTENDED RELEASE ORAL
Qty: 0 | Refills: 0 | COMMUNITY

## 2018-05-11 RX ORDER — DULAGLUTIDE 4.5 MG/.5ML
5 INJECTION, SOLUTION SUBCUTANEOUS
Qty: 0 | Refills: 0 | COMMUNITY

## 2018-05-11 RX ORDER — LISINOPRIL 2.5 MG/1
1 TABLET ORAL
Qty: 0 | Refills: 0 | COMMUNITY

## 2018-05-11 NOTE — BRIEF OPERATIVE NOTE - OPERATION/FINDINGS
7 coaxial cores right inferior pubic ramus lytic bone lesion biopsies.  abnormal clusters of cells seen on touch prep, mixed with blood.  Gelfoam tract embolization.

## 2018-05-11 NOTE — BRIEF OPERATIVE NOTE - PROCEDURE
<<-----Click on this checkbox to enter Procedure Biopsy, bone, deep, with CT guidance  05/11/2018    Active  HALPER

## 2018-05-11 NOTE — ASU DISCHARGE PLAN (ADULT/PEDIATRIC). - MEDICATION SUMMARY - MEDICATIONS TO TAKE
I will START or STAY ON the medications listed below when I get home from the hospital:    aspirin 81 mg oral tablet  -- 1 tab(s) by mouth once a day  -- Indication: For per PMD    Percocet 10/325 oral tablet  -- 1 tab(s) by mouth every 6 hours MDD:4 tabs  -- Caution federal law prohibits the transfer of this drug to any person other  than the person for whom it was prescribed.  May cause drowsiness.  Alcohol may intensify this effect.  Use care when operating dangerous machinery.  This prescription cannot be refilled.  This product contains acetaminophen.  Do not use  with any other product containing acetaminophen to prevent possible liver damage.  Using more of this medication than prescribed may cause serious breathing problems.    -- Indication: For per PMD    lisinopril 5 mg oral tablet  -- 1 tab(s) by mouth once a day  -- Indication: For per PMD    gabapentin 600 mg oral tablet  -- 2 tab(s) by mouth 3 times a day  -- Indication: For per PMD    Lantus  -- Indication: For per PMD    Trulicity Pen  -- 5  subcutaneous  -- Indication: For per PMD    glimepiride 4 mg oral tablet  -- 1 tab(s) by mouth once a day (at bedtime)  -- Indication: For per PMD    desloratadine 5 mg oral tablet  -- 1 tab(s) by mouth once a day  -- Indication: For per PMD    atorvastatin 40 mg oral tablet  -- 1 tab(s) by mouth once a day (at bedtime)  -- Indication: For per PMD    Metoprolol Succinate ER 50 mg oral tablet, extended release  -- 1 tab(s) by mouth once a day  -- Indication: For per PMD    ProAir HFA 90 mcg/inh inhalation aerosol  -- 2 puff(s) inhaled 4 times a day  -- Indication: For per PMD    furosemide 20 mg oral tablet  -- 1 tab(s) by mouth once a day  -- Indication: For per PMD    buPROPion 200 mg/12 hours (SR) oral tablet, extended release  -- orally once a day  -- Indication: For per PMD    Vitamin D3 5000 intl units oral capsule  -- 1 cap(s) by mouth once a day  -- Indication: For per PMD

## 2018-05-14 VITALS
OXYGEN SATURATION: 95 % | DIASTOLIC BLOOD PRESSURE: 63 MMHG | SYSTOLIC BLOOD PRESSURE: 111 MMHG | TEMPERATURE: 98.4 F | HEART RATE: 71 BPM | RESPIRATION RATE: 16 BRPM

## 2018-05-14 PROCEDURE — 77280 THER RAD SIMULAJ FIELD SMPL: CPT | Mod: 26

## 2018-05-15 LAB — SURGICAL PATHOLOGY FINAL REPORT - CH: SIGNIFICANT CHANGE UP

## 2018-05-17 ENCOUNTER — APPOINTMENT (OUTPATIENT)
Dept: HEMATOLOGY ONCOLOGY | Facility: CLINIC | Age: 63
End: 2018-05-17
Payer: MEDICARE

## 2018-05-17 ENCOUNTER — MEDICATION RENEWAL (OUTPATIENT)
Age: 63
End: 2018-05-17

## 2018-05-18 PROCEDURE — 77427 RADIATION TX MANAGEMENT X5: CPT

## 2018-05-21 LAB — PATH REPORT ADDENDUM.SYNOPTIC DOC: SIGNIFICANT CHANGE UP

## 2018-05-22 ENCOUNTER — APPOINTMENT (OUTPATIENT)
Dept: HEMATOLOGY ONCOLOGY | Facility: CLINIC | Age: 63
End: 2018-05-22
Payer: MEDICARE

## 2018-05-22 ENCOUNTER — RESULT REVIEW (OUTPATIENT)
Age: 63
End: 2018-05-22

## 2018-05-22 VITALS
HEART RATE: 82 BPM | BODY MASS INDEX: 40.6 KG/M2 | DIASTOLIC BLOOD PRESSURE: 65 MMHG | OXYGEN SATURATION: 96 % | WEIGHT: 232 LBS | HEIGHT: 63.5 IN | SYSTOLIC BLOOD PRESSURE: 100 MMHG

## 2018-05-22 LAB
BASOPHILS # BLD AUTO: 0.1 K/UL — SIGNIFICANT CHANGE UP (ref 0–0.2)
BASOPHILS NFR BLD AUTO: 1.1 % — SIGNIFICANT CHANGE UP (ref 0–2)
EOSINOPHIL # BLD AUTO: 0.2 K/UL — SIGNIFICANT CHANGE UP (ref 0–0.5)
EOSINOPHIL NFR BLD AUTO: 1.5 % — SIGNIFICANT CHANGE UP (ref 0–6)
HCT VFR BLD CALC: 40 % — SIGNIFICANT CHANGE UP (ref 34.5–45)
HGB BLD-MCNC: 12.9 G/DL — SIGNIFICANT CHANGE UP (ref 11.5–15.5)
LYMPHOCYTES # BLD AUTO: 29.5 % — SIGNIFICANT CHANGE UP (ref 13–44)
LYMPHOCYTES # BLD AUTO: 3.5 K/UL — HIGH (ref 1–3.3)
MCHC RBC-ENTMCNC: 31.3 PG — SIGNIFICANT CHANGE UP (ref 27–34)
MCHC RBC-ENTMCNC: 32.2 GM/DL — SIGNIFICANT CHANGE UP (ref 32–36)
MCV RBC AUTO: 97.2 FL — SIGNIFICANT CHANGE UP (ref 80–100)
MONOCYTES # BLD AUTO: 1.2 K/UL — HIGH (ref 0–0.9)
MONOCYTES NFR BLD AUTO: 9.9 % — SIGNIFICANT CHANGE UP (ref 2–14)
NEUTROPHILS # BLD AUTO: 6.8 K/UL — SIGNIFICANT CHANGE UP (ref 1.8–7.4)
NEUTROPHILS NFR BLD AUTO: 58 % — SIGNIFICANT CHANGE UP (ref 43–77)
PLATELET # BLD AUTO: 314 K/UL — SIGNIFICANT CHANGE UP (ref 150–400)
RBC # BLD: 4.11 M/UL — SIGNIFICANT CHANGE UP (ref 3.8–5.2)
RBC # FLD: 12.9 % — SIGNIFICANT CHANGE UP (ref 10.3–14.5)
WBC # BLD: 11.7 K/UL — HIGH (ref 3.8–10.5)
WBC # FLD AUTO: 11.7 K/UL — HIGH (ref 3.8–10.5)

## 2018-05-22 PROCEDURE — 99215 OFFICE O/P EST HI 40 MIN: CPT

## 2018-05-23 LAB
ALBUMIN SERPL ELPH-MCNC: 3.8 G/DL
ALP BLD-CCNC: 114 U/L
ALT SERPL-CCNC: 23 U/L
ANION GAP SERPL CALC-SCNC: 13 MMOL/L
AST SERPL-CCNC: 20 U/L
BILIRUB SERPL-MCNC: 0.2 MG/DL
BUN SERPL-MCNC: 44 MG/DL
CALCIUM SERPL-MCNC: 9.7 MG/DL
CHLORIDE SERPL-SCNC: 103 MMOL/L
CO2 SERPL-SCNC: 23 MMOL/L
CREAT SERPL-MCNC: 1.35 MG/DL
GLUCOSE SERPL-MCNC: 137 MG/DL
MAGNESIUM SERPL-MCNC: 2 MG/DL
POTASSIUM SERPL-SCNC: 4.7 MMOL/L
PROT SERPL-MCNC: 7.4 G/DL
SODIUM SERPL-SCNC: 139 MMOL/L

## 2018-05-30 ENCOUNTER — APPOINTMENT (OUTPATIENT)
Dept: CARDIOLOGY | Facility: CLINIC | Age: 63
End: 2018-05-30
Payer: MEDICARE

## 2018-05-30 PROCEDURE — 93306 TTE W/DOPPLER COMPLETE: CPT

## 2018-06-08 ENCOUNTER — RESULT REVIEW (OUTPATIENT)
Age: 63
End: 2018-06-08

## 2018-06-12 ENCOUNTER — OTHER (OUTPATIENT)
Age: 63
End: 2018-06-12

## 2018-06-12 ENCOUNTER — OUTPATIENT (OUTPATIENT)
Dept: OUTPATIENT SERVICES | Facility: HOSPITAL | Age: 63
LOS: 1 days | Discharge: HOME | End: 2018-06-12

## 2018-06-12 DIAGNOSIS — Z98.890 OTHER SPECIFIED POSTPROCEDURAL STATES: Chronic | ICD-10-CM

## 2018-06-12 DIAGNOSIS — Z95.2 PRESENCE OF PROSTHETIC HEART VALVE: Chronic | ICD-10-CM

## 2018-06-12 DIAGNOSIS — C64.9 MALIGNANT NEOPLASM OF UNSPECIFIED KIDNEY, EXCEPT RENAL PELVIS: ICD-10-CM

## 2018-06-13 ENCOUNTER — RESULT REVIEW (OUTPATIENT)
Age: 63
End: 2018-06-13

## 2018-06-13 ENCOUNTER — APPOINTMENT (OUTPATIENT)
Dept: HEMATOLOGY ONCOLOGY | Facility: CLINIC | Age: 63
End: 2018-06-13
Payer: MEDICARE

## 2018-06-13 VITALS
TEMPERATURE: 97.9 F | HEIGHT: 63.5 IN | OXYGEN SATURATION: 93 % | HEART RATE: 97 BPM | DIASTOLIC BLOOD PRESSURE: 73 MMHG | BODY MASS INDEX: 40.62 KG/M2 | WEIGHT: 232.14 LBS | SYSTOLIC BLOOD PRESSURE: 121 MMHG

## 2018-06-13 LAB
BASOPHILS # BLD AUTO: 0.1 K/UL — SIGNIFICANT CHANGE UP (ref 0–0.2)
BASOPHILS NFR BLD AUTO: 0.8 % — SIGNIFICANT CHANGE UP (ref 0–2)
EOSINOPHIL # BLD AUTO: 0.2 K/UL — SIGNIFICANT CHANGE UP (ref 0–0.5)
EOSINOPHIL NFR BLD AUTO: 1.6 % — SIGNIFICANT CHANGE UP (ref 0–6)
HCT VFR BLD CALC: 44 % — SIGNIFICANT CHANGE UP (ref 34.5–45)
HGB BLD-MCNC: 15 G/DL — SIGNIFICANT CHANGE UP (ref 11.5–15.5)
LYMPHOCYTES # BLD AUTO: 39.4 % — SIGNIFICANT CHANGE UP (ref 13–44)
LYMPHOCYTES # BLD AUTO: 4.4 K/UL — HIGH (ref 1–3.3)
MCHC RBC-ENTMCNC: 32 PG — SIGNIFICANT CHANGE UP (ref 27–34)
MCHC RBC-ENTMCNC: 34 GM/DL — SIGNIFICANT CHANGE UP (ref 32–36)
MCV RBC AUTO: 94 FL — SIGNIFICANT CHANGE UP (ref 80–100)
MONOCYTES # BLD AUTO: 1.1 K/UL — HIGH (ref 0–0.9)
MONOCYTES NFR BLD AUTO: 9.7 % — SIGNIFICANT CHANGE UP (ref 2–14)
NEUTROPHILS # BLD AUTO: 5.4 K/UL — SIGNIFICANT CHANGE UP (ref 1.8–7.4)
NEUTROPHILS NFR BLD AUTO: 48.4 % — SIGNIFICANT CHANGE UP (ref 43–77)
PLATELET # BLD AUTO: 268 K/UL — SIGNIFICANT CHANGE UP (ref 150–400)
RBC # BLD: 4.68 M/UL — SIGNIFICANT CHANGE UP (ref 3.8–5.2)
RBC # FLD: 13.5 % — SIGNIFICANT CHANGE UP (ref 10.3–14.5)
WBC # BLD: 11.1 K/UL — HIGH (ref 3.8–10.5)
WBC # FLD AUTO: 11.1 K/UL — HIGH (ref 3.8–10.5)

## 2018-06-13 PROCEDURE — 99214 OFFICE O/P EST MOD 30 MIN: CPT

## 2018-06-14 ENCOUNTER — RX RENEWAL (OUTPATIENT)
Age: 63
End: 2018-06-14

## 2018-06-14 LAB
ALBUMIN SERPL ELPH-MCNC: 3.6 G/DL
ALP BLD-CCNC: 145 U/L
ALT SERPL-CCNC: 41 U/L
ANION GAP SERPL CALC-SCNC: 13 MMOL/L
AST SERPL-CCNC: 34 U/L
BILIRUB SERPL-MCNC: 0.3 MG/DL
BUN SERPL-MCNC: 26 MG/DL
CALCIUM SERPL-MCNC: 8.9 MG/DL
CHLORIDE SERPL-SCNC: 107 MMOL/L
CO2 SERPL-SCNC: 23 MMOL/L
CREAT SERPL-MCNC: 1.18 MG/DL
GLUCOSE SERPL-MCNC: 103 MG/DL
MAGNESIUM SERPL-MCNC: 1.9 MG/DL
POTASSIUM SERPL-SCNC: 4.7 MMOL/L
PROT SERPL-MCNC: 7.3 G/DL
SODIUM SERPL-SCNC: 143 MMOL/L

## 2018-06-20 ENCOUNTER — APPOINTMENT (OUTPATIENT)
Dept: INFUSION THERAPY | Facility: CLINIC | Age: 63
End: 2018-06-20

## 2018-06-20 ENCOUNTER — APPOINTMENT (OUTPATIENT)
Dept: HEMATOLOGY ONCOLOGY | Facility: CLINIC | Age: 63
End: 2018-06-20
Payer: MEDICARE

## 2018-06-20 VITALS
HEIGHT: 63.5 IN | SYSTOLIC BLOOD PRESSURE: 123 MMHG | OXYGEN SATURATION: 93 % | HEART RATE: 80 BPM | TEMPERATURE: 98.1 F | DIASTOLIC BLOOD PRESSURE: 72 MMHG

## 2018-06-20 PROCEDURE — 99215 OFFICE O/P EST HI 40 MIN: CPT

## 2018-06-20 RX ORDER — OXYCODONE 5 MG/1
5 TABLET ORAL
Qty: 90 | Refills: 0 | Status: ACTIVE | COMMUNITY
Start: 2018-05-31 | End: 1900-01-01

## 2018-06-28 ENCOUNTER — APPOINTMENT (OUTPATIENT)
Dept: HEMATOLOGY ONCOLOGY | Facility: CLINIC | Age: 63
End: 2018-06-28
Payer: MEDICARE

## 2018-06-28 ENCOUNTER — APPOINTMENT (OUTPATIENT)
Dept: RADIATION ONCOLOGY | Facility: CLINIC | Age: 63
End: 2018-06-28
Payer: MEDICARE

## 2018-06-28 ENCOUNTER — RESULT REVIEW (OUTPATIENT)
Age: 63
End: 2018-06-28

## 2018-06-28 VITALS
HEART RATE: 96 BPM | SYSTOLIC BLOOD PRESSURE: 120 MMHG | DIASTOLIC BLOOD PRESSURE: 70 MMHG | HEIGHT: 63 IN | RESPIRATION RATE: 16 BRPM | BODY MASS INDEX: 40.93 KG/M2 | OXYGEN SATURATION: 95 % | WEIGHT: 231 LBS | TEMPERATURE: 98 F

## 2018-06-28 VITALS
TEMPERATURE: 98 F | WEIGHT: 231 LBS | SYSTOLIC BLOOD PRESSURE: 118 MMHG | DIASTOLIC BLOOD PRESSURE: 78 MMHG | HEART RATE: 96 BPM | OXYGEN SATURATION: 95 % | BODY MASS INDEX: 40.92 KG/M2

## 2018-06-28 DIAGNOSIS — I50.22 CHRONIC SYSTOLIC (CONGESTIVE) HEART FAILURE: ICD-10-CM

## 2018-06-28 DIAGNOSIS — E66.01 MORBID (SEVERE) OBESITY DUE TO EXCESS CALORIES: ICD-10-CM

## 2018-06-28 LAB
BASOPHILS # BLD AUTO: 0.1 K/UL — SIGNIFICANT CHANGE UP (ref 0–0.2)
BASOPHILS NFR BLD AUTO: 0.7 % — SIGNIFICANT CHANGE UP (ref 0–2)
EOSINOPHIL # BLD AUTO: 0.1 K/UL — SIGNIFICANT CHANGE UP (ref 0–0.5)
EOSINOPHIL NFR BLD AUTO: 0.8 % — SIGNIFICANT CHANGE UP (ref 0–6)
HCT VFR BLD CALC: 48 % — HIGH (ref 34.5–45)
HGB BLD-MCNC: 16.2 G/DL — HIGH (ref 11.5–15.5)
LYMPHOCYTES # BLD AUTO: 2.6 K/UL — SIGNIFICANT CHANGE UP (ref 1–3.3)
LYMPHOCYTES # BLD AUTO: 28.4 % — SIGNIFICANT CHANGE UP (ref 13–44)
MCHC RBC-ENTMCNC: 32.5 PG — SIGNIFICANT CHANGE UP (ref 27–34)
MCHC RBC-ENTMCNC: 33.8 GM/DL — SIGNIFICANT CHANGE UP (ref 32–36)
MCV RBC AUTO: 96.3 FL — SIGNIFICANT CHANGE UP (ref 80–100)
MONOCYTES # BLD AUTO: 0.7 K/UL — SIGNIFICANT CHANGE UP (ref 0–0.9)
MONOCYTES NFR BLD AUTO: 7.5 % — SIGNIFICANT CHANGE UP (ref 2–14)
NEUTROPHILS # BLD AUTO: 5.8 K/UL — SIGNIFICANT CHANGE UP (ref 1.8–7.4)
NEUTROPHILS NFR BLD AUTO: 62.6 % — SIGNIFICANT CHANGE UP (ref 43–77)
PLATELET # BLD AUTO: 221 K/UL — SIGNIFICANT CHANGE UP (ref 150–400)
RBC # BLD: 4.98 M/UL — SIGNIFICANT CHANGE UP (ref 3.8–5.2)
RBC # FLD: 14.9 % — HIGH (ref 10.3–14.5)
WBC # BLD: 9.3 K/UL — SIGNIFICANT CHANGE UP (ref 3.8–10.5)
WBC # FLD AUTO: 9.3 K/UL — SIGNIFICANT CHANGE UP (ref 3.8–10.5)

## 2018-06-28 PROCEDURE — 99215 OFFICE O/P EST HI 40 MIN: CPT

## 2018-06-28 PROCEDURE — 99024 POSTOP FOLLOW-UP VISIT: CPT

## 2018-06-29 LAB
ALBUMIN SERPL ELPH-MCNC: 3.8 G/DL
ALP BLD-CCNC: 185 U/L
ALT SERPL-CCNC: 64 U/L
ANION GAP SERPL CALC-SCNC: 13 MMOL/L
AST SERPL-CCNC: 39 U/L
BILIRUB SERPL-MCNC: 0.5 MG/DL
BUN SERPL-MCNC: 25 MG/DL
CALCIUM SERPL-MCNC: 9.6 MG/DL
CHLORIDE SERPL-SCNC: 103 MMOL/L
CO2 SERPL-SCNC: 26 MMOL/L
CREAT SERPL-MCNC: 1.23 MG/DL
GLUCOSE SERPL-MCNC: 104 MG/DL
MAGNESIUM SERPL-MCNC: 2 MG/DL
POTASSIUM SERPL-SCNC: 5.4 MMOL/L
PROT SERPL-MCNC: 7.7 G/DL
SODIUM SERPL-SCNC: 142 MMOL/L

## 2018-07-03 RX ORDER — OXYCODONE HYDROCHLORIDE AND ACETAMINOPHEN 5; 325 MG/1; MG/1
5-325 TABLET ORAL
Refills: 0 | Status: DISCONTINUED | COMMUNITY
Start: 2018-04-20 | End: 2018-07-03

## 2018-07-03 RX ORDER — OXYCODONE AND ACETAMINOPHEN 7.5; 325 MG/1; MG/1
7.5-325 TABLET ORAL
Qty: 60 | Refills: 0 | Status: DISCONTINUED | COMMUNITY
Start: 2018-04-30 | End: 2018-07-03

## 2018-07-11 ENCOUNTER — OUTPATIENT (OUTPATIENT)
Dept: OUTPATIENT SERVICES | Facility: HOSPITAL | Age: 63
LOS: 1 days | Discharge: HOME | End: 2018-07-11

## 2018-07-11 DIAGNOSIS — Z95.2 PRESENCE OF PROSTHETIC HEART VALVE: Chronic | ICD-10-CM

## 2018-07-11 DIAGNOSIS — Z98.890 OTHER SPECIFIED POSTPROCEDURAL STATES: Chronic | ICD-10-CM

## 2018-07-11 DIAGNOSIS — C64.9 MALIGNANT NEOPLASM OF UNSPECIFIED KIDNEY, EXCEPT RENAL PELVIS: ICD-10-CM

## 2018-07-16 ENCOUNTER — APPOINTMENT (OUTPATIENT)
Dept: HEMATOLOGY ONCOLOGY | Facility: CLINIC | Age: 63
End: 2018-07-16
Payer: MEDICARE

## 2018-07-16 ENCOUNTER — RESULT REVIEW (OUTPATIENT)
Age: 63
End: 2018-07-16

## 2018-07-16 ENCOUNTER — APPOINTMENT (OUTPATIENT)
Dept: FAMILY MEDICINE | Facility: CLINIC | Age: 63
End: 2018-07-16
Payer: MEDICARE

## 2018-07-16 VITALS
HEART RATE: 88 BPM | BODY MASS INDEX: 39.61 KG/M2 | SYSTOLIC BLOOD PRESSURE: 102 MMHG | HEIGHT: 64 IN | WEIGHT: 232 LBS | DIASTOLIC BLOOD PRESSURE: 60 MMHG

## 2018-07-16 VITALS — OXYGEN SATURATION: 94 % | HEART RATE: 90 BPM | HEIGHT: 64 IN | TEMPERATURE: 98.1 F | BODY MASS INDEX: 39.82 KG/M2

## 2018-07-16 DIAGNOSIS — H91.90 UNSPECIFIED HEARING LOSS, UNSPECIFIED EAR: ICD-10-CM

## 2018-07-16 LAB
BASOPHILS # BLD AUTO: 0.1 K/UL — SIGNIFICANT CHANGE UP (ref 0–0.2)
BASOPHILS NFR BLD AUTO: 0.9 % — SIGNIFICANT CHANGE UP (ref 0–2)
EOSINOPHIL # BLD AUTO: 0.3 K/UL — SIGNIFICANT CHANGE UP (ref 0–0.5)
EOSINOPHIL NFR BLD AUTO: 2.1 % — SIGNIFICANT CHANGE UP (ref 0–6)
HCT VFR BLD CALC: 43.8 % — SIGNIFICANT CHANGE UP (ref 34.5–45)
HGB BLD-MCNC: 14.7 G/DL — SIGNIFICANT CHANGE UP (ref 11.5–15.5)
LYMPHOCYTES # BLD AUTO: 33.1 % — SIGNIFICANT CHANGE UP (ref 13–44)
LYMPHOCYTES # BLD AUTO: 4 K/UL — HIGH (ref 1–3.3)
MCHC RBC-ENTMCNC: 32.8 PG — SIGNIFICANT CHANGE UP (ref 27–34)
MCHC RBC-ENTMCNC: 33.6 GM/DL — SIGNIFICANT CHANGE UP (ref 32–36)
MCV RBC AUTO: 97.5 FL — SIGNIFICANT CHANGE UP (ref 80–100)
MONOCYTES # BLD AUTO: 1.1 K/UL — HIGH (ref 0–0.9)
MONOCYTES NFR BLD AUTO: 9.1 % — SIGNIFICANT CHANGE UP (ref 2–14)
NEUTROPHILS # BLD AUTO: 6.7 K/UL — SIGNIFICANT CHANGE UP (ref 1.8–7.4)
NEUTROPHILS NFR BLD AUTO: 54.8 % — SIGNIFICANT CHANGE UP (ref 43–77)
PLATELET # BLD AUTO: 231 K/UL — SIGNIFICANT CHANGE UP (ref 150–400)
RBC # BLD: 4.49 M/UL — SIGNIFICANT CHANGE UP (ref 3.8–5.2)
RBC # FLD: 15.3 % — HIGH (ref 10.3–14.5)
WBC # BLD: 12.2 K/UL — HIGH (ref 3.8–10.5)
WBC # FLD AUTO: 12.2 K/UL — HIGH (ref 3.8–10.5)

## 2018-07-16 PROCEDURE — 99215 OFFICE O/P EST HI 40 MIN: CPT

## 2018-07-16 PROCEDURE — 99214 OFFICE O/P EST MOD 30 MIN: CPT

## 2018-07-17 PROBLEM — N28.89 OTHER SPECIFIED DISORDERS OF KIDNEY AND URETER: Chronic | Status: ACTIVE | Noted: 2017-03-07

## 2018-07-17 LAB
ALBUMIN SERPL ELPH-MCNC: 3.8 G/DL
ALP BLD-CCNC: 132 U/L
ALT SERPL-CCNC: 26 U/L
ANION GAP SERPL CALC-SCNC: 15 MMOL/L
AST SERPL-CCNC: 19 U/L
BILIRUB SERPL-MCNC: 0.4 MG/DL
BUN SERPL-MCNC: 32 MG/DL
CALCIUM SERPL-MCNC: 8.9 MG/DL
CHLORIDE SERPL-SCNC: 106 MMOL/L
CO2 SERPL-SCNC: 23 MMOL/L
CREAT SERPL-MCNC: 1.29 MG/DL
GLUCOSE SERPL-MCNC: 146 MG/DL
MAGNESIUM SERPL-MCNC: 2 MG/DL
POTASSIUM SERPL-SCNC: 4.4 MMOL/L
PROT SERPL-MCNC: 6.9 G/DL
SODIUM SERPL-SCNC: 144 MMOL/L

## 2018-07-18 RX ORDER — FENTANYL 50 UG/H
50 PATCH, EXTENDED RELEASE TRANSDERMAL
Qty: 10 | Refills: 0 | Status: DISCONTINUED | COMMUNITY
Start: 2018-06-20 | End: 2018-07-18

## 2018-07-26 NOTE — PHYSICAL EXAM
[Normal Outer Ear/Nose] : the outer ears and nose were normal in appearance [Normal Oropharynx] : the oropharynx was normal [Normal TMs] : both tympanic membranes were normal [Normal Sclera/Conjunctiva] : normal sclera/conjunctiva [Supple] : supple [No Respiratory Distress] : no respiratory distress  [Clear to Auscultation] : lungs were clear to auscultation bilaterally [Normal Rate] : normal rate  [Normal S1, S2] : normal S1 and S2 [No Focal Deficits] : no focal deficits [Alert and Oriented x3] : oriented to person, place, and time [de-identified] : pleasant and chatty  [de-identified] : RIGHT EAR with minimal cerumen  soft

## 2018-07-26 NOTE — HISTORY OF PRESENT ILLNESS
[FreeTextEntry8] : Last seen in [place] office .\par chart reviewed and hemolytic appreciated.\par \par Today she is brought in by her daughter can check a with a chief complaint of hearing loss and a feeling of fullness in her ears for about a month.\par No URI symptoms or suggestion of ALLERGIC rhinitis. \par

## 2018-07-26 NOTE — ASSESSMENT
[FreeTextEntry1] : \par Hearing loss without cerumen impaction. \par Advised ENT for hearing test. \par \par Best wishes offered.

## 2018-08-02 ENCOUNTER — RESULT REVIEW (OUTPATIENT)
Age: 63
End: 2018-08-02

## 2018-08-02 ENCOUNTER — OTHER (OUTPATIENT)
Age: 63
End: 2018-08-02

## 2018-08-02 ENCOUNTER — APPOINTMENT (OUTPATIENT)
Dept: HEMATOLOGY ONCOLOGY | Facility: CLINIC | Age: 63
End: 2018-08-02
Payer: MEDICARE

## 2018-08-02 VITALS
HEART RATE: 80 BPM | WEIGHT: 233.69 LBS | HEIGHT: 64 IN | DIASTOLIC BLOOD PRESSURE: 80 MMHG | TEMPERATURE: 98 F | OXYGEN SATURATION: 92 % | SYSTOLIC BLOOD PRESSURE: 123 MMHG | BODY MASS INDEX: 39.9 KG/M2

## 2018-08-02 LAB
BASOPHILS # BLD AUTO: 0.1 K/UL — SIGNIFICANT CHANGE UP (ref 0–0.2)
BASOPHILS NFR BLD AUTO: 0.9 % — SIGNIFICANT CHANGE UP (ref 0–2)
EOSINOPHIL # BLD AUTO: 0.3 K/UL — SIGNIFICANT CHANGE UP (ref 0–0.5)
EOSINOPHIL NFR BLD AUTO: 2.2 % — SIGNIFICANT CHANGE UP (ref 0–6)
HCT VFR BLD CALC: 42.3 % — SIGNIFICANT CHANGE UP (ref 34.5–45)
HGB BLD-MCNC: 14.6 G/DL — SIGNIFICANT CHANGE UP (ref 11.5–15.5)
LYMPHOCYTES # BLD AUTO: 34.1 % — SIGNIFICANT CHANGE UP (ref 13–44)
LYMPHOCYTES # BLD AUTO: 4.2 K/UL — HIGH (ref 1–3.3)
MCHC RBC-ENTMCNC: 32.8 PG — SIGNIFICANT CHANGE UP (ref 27–34)
MCHC RBC-ENTMCNC: 34.4 GM/DL — SIGNIFICANT CHANGE UP (ref 32–36)
MCV RBC AUTO: 95.4 FL — SIGNIFICANT CHANGE UP (ref 80–100)
MONOCYTES # BLD AUTO: 1.1 K/UL — HIGH (ref 0–0.9)
MONOCYTES NFR BLD AUTO: 9.1 % — SIGNIFICANT CHANGE UP (ref 2–14)
NEUTROPHILS # BLD AUTO: 6.6 K/UL — SIGNIFICANT CHANGE UP (ref 1.8–7.4)
NEUTROPHILS NFR BLD AUTO: 53.6 % — SIGNIFICANT CHANGE UP (ref 43–77)
PLATELET # BLD AUTO: 268 K/UL — SIGNIFICANT CHANGE UP (ref 150–400)
RBC # BLD: 4.44 M/UL — SIGNIFICANT CHANGE UP (ref 3.8–5.2)
RBC # FLD: 14.5 % — SIGNIFICANT CHANGE UP (ref 10.3–14.5)
WBC # BLD: 12.4 K/UL — HIGH (ref 3.8–10.5)
WBC # FLD AUTO: 12.4 K/UL — HIGH (ref 3.8–10.5)

## 2018-08-02 PROCEDURE — 99214 OFFICE O/P EST MOD 30 MIN: CPT

## 2018-08-02 RX ORDER — UREA 20 %
20 CREAM (GRAM) TOPICAL TWICE DAILY
Qty: 3 | Refills: 0 | Status: ACTIVE | COMMUNITY
Start: 2018-08-02 | End: 1900-01-01

## 2018-08-03 LAB
ALBUMIN SERPL ELPH-MCNC: 3.9 G/DL
ALP BLD-CCNC: 142 U/L
ALT SERPL-CCNC: 35 U/L
ANION GAP SERPL CALC-SCNC: 14 MMOL/L
AST SERPL-CCNC: 22 U/L
BILIRUB SERPL-MCNC: 0.2 MG/DL
BUN SERPL-MCNC: 35 MG/DL
CALCIUM SERPL-MCNC: 9.3 MG/DL
CHLORIDE SERPL-SCNC: 106 MMOL/L
CO2 SERPL-SCNC: 26 MMOL/L
CREAT SERPL-MCNC: 1.3 MG/DL
GLUCOSE SERPL-MCNC: 84 MG/DL
MAGNESIUM SERPL-MCNC: 2 MG/DL
POTASSIUM SERPL-SCNC: 4.8 MMOL/L
PROT SERPL-MCNC: 7.2 G/DL
SODIUM SERPL-SCNC: 146 MMOL/L

## 2018-08-07 ENCOUNTER — CHART COPY (OUTPATIENT)
Age: 63
End: 2018-08-07

## 2018-08-07 ENCOUNTER — OUTPATIENT (OUTPATIENT)
Dept: OUTPATIENT SERVICES | Facility: HOSPITAL | Age: 63
LOS: 1 days | Discharge: HOME | End: 2018-08-07

## 2018-08-07 ENCOUNTER — APPOINTMENT (OUTPATIENT)
Dept: OTOLARYNGOLOGY | Facility: CLINIC | Age: 63
End: 2018-08-07
Payer: MEDICARE

## 2018-08-07 VITALS
BODY MASS INDEX: 39.78 KG/M2 | SYSTOLIC BLOOD PRESSURE: 109 MMHG | HEART RATE: 109 BPM | WEIGHT: 233 LBS | HEIGHT: 64 IN | DIASTOLIC BLOOD PRESSURE: 73 MMHG

## 2018-08-07 DIAGNOSIS — C64.9 MALIGNANT NEOPLASM OF UNSPECIFIED KIDNEY, EXCEPT RENAL PELVIS: ICD-10-CM

## 2018-08-07 DIAGNOSIS — J30.9 ALLERGIC RHINITIS, UNSPECIFIED: ICD-10-CM

## 2018-08-07 DIAGNOSIS — Z95.2 PRESENCE OF PROSTHETIC HEART VALVE: Chronic | ICD-10-CM

## 2018-08-07 DIAGNOSIS — Z98.890 OTHER SPECIFIED POSTPROCEDURAL STATES: Chronic | ICD-10-CM

## 2018-08-07 DIAGNOSIS — H91.93 UNSPECIFIED HEARING LOSS, BILATERAL: ICD-10-CM

## 2018-08-07 DIAGNOSIS — H69.93 UNSPECIFIED EUSTACHIAN TUBE DISORDER, BILATERAL: ICD-10-CM

## 2018-08-07 PROCEDURE — 99202 OFFICE O/P NEW SF 15 MIN: CPT | Mod: 25

## 2018-08-07 PROCEDURE — 92557 COMPREHENSIVE HEARING TEST: CPT

## 2018-08-07 PROCEDURE — 92567 TYMPANOMETRY: CPT

## 2018-08-07 RX ORDER — MOMETASONE 50 UG/1
50 SPRAY, METERED NASAL DAILY
Qty: 1 | Refills: 0 | Status: ACTIVE | COMMUNITY
Start: 2018-08-07 | End: 1900-01-01

## 2018-08-15 ENCOUNTER — FORM ENCOUNTER (OUTPATIENT)
Age: 63
End: 2018-08-15

## 2018-08-16 ENCOUNTER — APPOINTMENT (OUTPATIENT)
Dept: CT IMAGING | Facility: CLINIC | Age: 63
End: 2018-08-16
Payer: MEDICARE

## 2018-08-16 ENCOUNTER — RESULT REVIEW (OUTPATIENT)
Age: 63
End: 2018-08-16

## 2018-08-16 ENCOUNTER — OUTPATIENT (OUTPATIENT)
Dept: OUTPATIENT SERVICES | Facility: HOSPITAL | Age: 63
LOS: 1 days | End: 2018-08-16

## 2018-08-16 ENCOUNTER — APPOINTMENT (OUTPATIENT)
Dept: HEMATOLOGY ONCOLOGY | Facility: CLINIC | Age: 63
End: 2018-08-16
Payer: MEDICARE

## 2018-08-16 VITALS
OXYGEN SATURATION: 93 % | BODY MASS INDEX: 40.46 KG/M2 | SYSTOLIC BLOOD PRESSURE: 117 MMHG | TEMPERATURE: 97.8 F | HEART RATE: 95 BPM | WEIGHT: 236.99 LBS | HEIGHT: 64 IN | DIASTOLIC BLOOD PRESSURE: 70 MMHG

## 2018-08-16 DIAGNOSIS — Z98.890 OTHER SPECIFIED POSTPROCEDURAL STATES: Chronic | ICD-10-CM

## 2018-08-16 DIAGNOSIS — Z95.2 PRESENCE OF PROSTHETIC HEART VALVE: Chronic | ICD-10-CM

## 2018-08-16 DIAGNOSIS — C64.9 MALIGNANT NEOPLASM OF UNSPECIFIED KIDNEY, EXCEPT RENAL PELVIS: ICD-10-CM

## 2018-08-16 DIAGNOSIS — K08.89 OTHER SPECIFIED DISORDERS OF TEETH AND SUPPORTING STRUCTURES: ICD-10-CM

## 2018-08-16 LAB
BASOPHILS # BLD AUTO: 0.1 K/UL — SIGNIFICANT CHANGE UP (ref 0–0.2)
BASOPHILS NFR BLD AUTO: 1 % — SIGNIFICANT CHANGE UP (ref 0–2)
EOSINOPHIL # BLD AUTO: 0.3 K/UL — SIGNIFICANT CHANGE UP (ref 0–0.5)
EOSINOPHIL NFR BLD AUTO: 2.9 % — SIGNIFICANT CHANGE UP (ref 0–6)
HCT VFR BLD CALC: 45.9 % — HIGH (ref 34.5–45)
HGB BLD-MCNC: 15.3 G/DL — SIGNIFICANT CHANGE UP (ref 11.5–15.5)
LYMPHOCYTES # BLD AUTO: 27.4 % — SIGNIFICANT CHANGE UP (ref 13–44)
LYMPHOCYTES # BLD AUTO: 3.1 K/UL — SIGNIFICANT CHANGE UP (ref 1–3.3)
MCHC RBC-ENTMCNC: 32.9 PG — SIGNIFICANT CHANGE UP (ref 27–34)
MCHC RBC-ENTMCNC: 33.4 GM/DL — SIGNIFICANT CHANGE UP (ref 32–36)
MCV RBC AUTO: 98.6 FL — SIGNIFICANT CHANGE UP (ref 80–100)
MONOCYTES # BLD AUTO: 1.2 K/UL — HIGH (ref 0–0.9)
MONOCYTES NFR BLD AUTO: 10.2 % — SIGNIFICANT CHANGE UP (ref 2–14)
NEUTROPHILS # BLD AUTO: 6.7 K/UL — SIGNIFICANT CHANGE UP (ref 1.8–7.4)
NEUTROPHILS NFR BLD AUTO: 58.5 % — SIGNIFICANT CHANGE UP (ref 43–77)
PLATELET # BLD AUTO: 273 K/UL — SIGNIFICANT CHANGE UP (ref 150–400)
RBC # BLD: 4.66 M/UL — SIGNIFICANT CHANGE UP (ref 3.8–5.2)
RBC # FLD: 15.3 % — HIGH (ref 10.3–14.5)
WBC # BLD: 11.4 K/UL — HIGH (ref 3.8–10.5)
WBC # FLD AUTO: 11.4 K/UL — HIGH (ref 3.8–10.5)

## 2018-08-16 PROCEDURE — 99214 OFFICE O/P EST MOD 30 MIN: CPT

## 2018-08-16 PROCEDURE — 74177 CT ABD & PELVIS W/CONTRAST: CPT | Mod: 26

## 2018-08-16 PROCEDURE — 71260 CT THORAX DX C+: CPT | Mod: 26

## 2018-08-17 LAB
ALBUMIN SERPL ELPH-MCNC: 4.1 G/DL
ALP BLD-CCNC: 128 U/L
ALT SERPL-CCNC: 25 U/L
ANION GAP SERPL CALC-SCNC: 14 MMOL/L
AST SERPL-CCNC: 18 U/L
BILIRUB SERPL-MCNC: 0.3 MG/DL
BUN SERPL-MCNC: 37 MG/DL
CALCIUM SERPL-MCNC: 10 MG/DL
CHLORIDE SERPL-SCNC: 101 MMOL/L
CO2 SERPL-SCNC: 27 MMOL/L
CREAT SERPL-MCNC: 1.06 MG/DL
GLUCOSE SERPL-MCNC: 80 MG/DL
MAGNESIUM SERPL-MCNC: 2.1 MG/DL
POTASSIUM SERPL-SCNC: 5 MMOL/L
PROT SERPL-MCNC: 7.2 G/DL
SODIUM SERPL-SCNC: 142 MMOL/L

## 2018-08-23 ENCOUNTER — RESULT REVIEW (OUTPATIENT)
Age: 63
End: 2018-08-23

## 2018-08-23 ENCOUNTER — APPOINTMENT (OUTPATIENT)
Dept: HEMATOLOGY ONCOLOGY | Facility: CLINIC | Age: 63
End: 2018-08-23
Payer: MEDICARE

## 2018-08-23 VITALS
TEMPERATURE: 98.1 F | BODY MASS INDEX: 40.89 KG/M2 | DIASTOLIC BLOOD PRESSURE: 58 MMHG | OXYGEN SATURATION: 93 % | HEIGHT: 64 IN | SYSTOLIC BLOOD PRESSURE: 86 MMHG | HEART RATE: 80 BPM | WEIGHT: 239.53 LBS

## 2018-08-23 LAB
ALBUMIN SERPL ELPH-MCNC: 3.9 G/DL
ALP BLD-CCNC: 115 U/L
ALT SERPL-CCNC: 19 U/L
ANION GAP SERPL CALC-SCNC: 12 MMOL/L
AST SERPL-CCNC: 16 U/L
BASOPHILS # BLD AUTO: 0.1 K/UL — SIGNIFICANT CHANGE UP (ref 0–0.2)
BASOPHILS NFR BLD AUTO: 0.7 % — SIGNIFICANT CHANGE UP (ref 0–2)
BILIRUB SERPL-MCNC: 0.3 MG/DL
BUN SERPL-MCNC: 42 MG/DL
CALCIUM SERPL-MCNC: 9.2 MG/DL
CHLORIDE SERPL-SCNC: 100 MMOL/L
CO2 SERPL-SCNC: 27 MMOL/L
CREAT SERPL-MCNC: 1.35 MG/DL
EOSINOPHIL # BLD AUTO: 0.4 K/UL — SIGNIFICANT CHANGE UP (ref 0–0.5)
EOSINOPHIL NFR BLD AUTO: 3 % — SIGNIFICANT CHANGE UP (ref 0–6)
GLUCOSE SERPL-MCNC: 106 MG/DL
HCT VFR BLD CALC: 42.7 % — SIGNIFICANT CHANGE UP (ref 34.5–45)
HGB BLD-MCNC: 14.1 G/DL — SIGNIFICANT CHANGE UP (ref 11.5–15.5)
LYMPHOCYTES # BLD AUTO: 30.9 % — SIGNIFICANT CHANGE UP (ref 13–44)
LYMPHOCYTES # BLD AUTO: 4.1 K/UL — HIGH (ref 1–3.3)
MAGNESIUM SERPL-MCNC: 2.1 MG/DL
MCHC RBC-ENTMCNC: 32.9 PG — SIGNIFICANT CHANGE UP (ref 27–34)
MCHC RBC-ENTMCNC: 33.1 GM/DL — SIGNIFICANT CHANGE UP (ref 32–36)
MCV RBC AUTO: 99.3 FL — SIGNIFICANT CHANGE UP (ref 80–100)
MONOCYTES # BLD AUTO: 1.4 K/UL — HIGH (ref 0–0.9)
MONOCYTES NFR BLD AUTO: 10.6 % — SIGNIFICANT CHANGE UP (ref 2–14)
NEUTROPHILS # BLD AUTO: 7.3 K/UL — SIGNIFICANT CHANGE UP (ref 1.8–7.4)
NEUTROPHILS NFR BLD AUTO: 54.8 % — SIGNIFICANT CHANGE UP (ref 43–77)
PLATELET # BLD AUTO: 281 K/UL — SIGNIFICANT CHANGE UP (ref 150–400)
POTASSIUM SERPL-SCNC: 4.7 MMOL/L
PROT SERPL-MCNC: 7 G/DL
RBC # BLD: 4.3 M/UL — SIGNIFICANT CHANGE UP (ref 3.8–5.2)
RBC # FLD: 15.1 % — HIGH (ref 10.3–14.5)
SODIUM SERPL-SCNC: 139 MMOL/L
WBC # BLD: 13.3 K/UL — HIGH (ref 3.8–10.5)
WBC # FLD AUTO: 13.3 K/UL — HIGH (ref 3.8–10.5)

## 2018-08-23 PROCEDURE — 99215 OFFICE O/P EST HI 40 MIN: CPT

## 2018-08-30 ENCOUNTER — APPOINTMENT (OUTPATIENT)
Dept: OTOLARYNGOLOGY | Facility: CLINIC | Age: 63
End: 2018-08-30

## 2018-09-04 ENCOUNTER — APPOINTMENT (OUTPATIENT)
Dept: OTOLARYNGOLOGY | Facility: CLINIC | Age: 63
End: 2018-09-04

## 2018-09-05 ENCOUNTER — APPOINTMENT (OUTPATIENT)
Dept: RADIATION ONCOLOGY | Facility: CLINIC | Age: 63
End: 2018-09-05
Payer: MEDICARE

## 2018-09-05 ENCOUNTER — APPOINTMENT (OUTPATIENT)
Dept: HEMATOLOGY ONCOLOGY | Facility: CLINIC | Age: 63
End: 2018-09-05

## 2018-09-05 VITALS
RESPIRATION RATE: 16 BRPM | DIASTOLIC BLOOD PRESSURE: 61 MMHG | WEIGHT: 233 LBS | BODY MASS INDEX: 39.78 KG/M2 | SYSTOLIC BLOOD PRESSURE: 94 MMHG | HEIGHT: 64 IN | HEART RATE: 70 BPM | OXYGEN SATURATION: 93 %

## 2018-09-05 DIAGNOSIS — Z92.3 PERSONAL HISTORY OF IRRADIATION: ICD-10-CM

## 2018-09-05 PROCEDURE — 77263 THER RADIOLOGY TX PLNG CPLX: CPT

## 2018-09-05 PROCEDURE — 99214 OFFICE O/P EST MOD 30 MIN: CPT | Mod: 25

## 2018-09-05 RX ORDER — AMOXICILLIN 500 MG/1
500 TABLET, FILM COATED ORAL
Qty: 4 | Refills: 2 | Status: DISCONTINUED | COMMUNITY
Start: 2018-07-16 | End: 2018-09-05

## 2018-09-05 RX ORDER — CABOZANTINIB 20 MG/1
20 TABLET ORAL DAILY
Qty: 60 | Refills: 0 | Status: DISCONTINUED | COMMUNITY
Start: 2018-06-28 | End: 2018-09-05

## 2018-09-05 RX ORDER — CABOZANTINIB 40 MG/1
40 TABLET ORAL DAILY
Qty: 30 | Refills: 3 | Status: DISCONTINUED | COMMUNITY
Start: 2018-05-22 | End: 2018-09-05

## 2018-09-06 PROCEDURE — 77290 THER RAD SIMULAJ FIELD CPLX: CPT | Mod: 26

## 2018-09-06 PROCEDURE — 77334 RADIATION TREATMENT AID(S): CPT | Mod: 26

## 2018-09-07 PROCEDURE — 77307 TELETHX ISODOSE PLAN CPLX: CPT | Mod: 26

## 2018-09-07 PROCEDURE — 77334 RADIATION TREATMENT AID(S): CPT | Mod: 26

## 2018-09-10 PROCEDURE — 77280 THER RAD SIMULAJ FIELD SMPL: CPT | Mod: 26

## 2018-09-11 ENCOUNTER — OUTPATIENT (OUTPATIENT)
Dept: OUTPATIENT SERVICES | Facility: HOSPITAL | Age: 63
LOS: 1 days | Discharge: HOME | End: 2018-09-11

## 2018-09-11 VITALS
OXYGEN SATURATION: 94 % | SYSTOLIC BLOOD PRESSURE: 94 MMHG | DIASTOLIC BLOOD PRESSURE: 65 MMHG | HEART RATE: 79 BPM | BODY MASS INDEX: 39.82 KG/M2 | WEIGHT: 232 LBS | RESPIRATION RATE: 16 BRPM

## 2018-09-11 DIAGNOSIS — Z98.890 OTHER SPECIFIED POSTPROCEDURAL STATES: Chronic | ICD-10-CM

## 2018-09-11 DIAGNOSIS — Z95.2 PRESENCE OF PROSTHETIC HEART VALVE: Chronic | ICD-10-CM

## 2018-09-11 DIAGNOSIS — C64.9 MALIGNANT NEOPLASM OF UNSPECIFIED KIDNEY, EXCEPT RENAL PELVIS: ICD-10-CM

## 2018-09-11 NOTE — REASON FOR VISIT
[Routine On-Treatment] : a routine on-treatment visit for [Other: ___] : [unfilled] [Family Member] : family member

## 2018-09-11 NOTE — PHYSICAL EXAM
[Normal] : oriented to person, place and time, the affect was normal, the mood was normal and not anxious

## 2018-09-11 NOTE — DISEASE MANAGEMENT
[Clinical] : TNM Stage: c [FreeTextEntry4] : metastatic renal cell [TTNM] : 1b [NTNM] : 0 [MTNM] : 1 [IV] : IV [de-identified] : 305 [de-identified] : 2000 [de-identified] : pubic ramus

## 2018-09-11 NOTE — REVIEW OF SYSTEMS
[Hematuria: Grade 0] : Hematuria: Grade 0 [Urinary Incontinence: Grade 0] : Urinary Incontinence: Grade 0  [Urinary Tract Pain: Grade 0] : Urinary Tract Pain: Grade 0 [Urinary Urgency: Grade 0] : Urinary Urgency: Grade 0 [Fatigue: Grade 0] : Fatigue: Grade 0 [Localized Edema: Grade 0] : Localized Edema: Grade 0  [Skin Hyperpigmentation: Grade 0] : Skin Hyperpigmentation: Grade 0 [Dermatitis Radiation: Grade 0] : Dermatitis Radiation: Grade 0

## 2018-09-11 NOTE — VITALS
[Maximal Pain Intensity: 6/10] : 6/10 [Least Pain Intensity: 2/10] : 2/10 [Pain Description/Quality: ___] : Pain description/quality: [unfilled] [Pain Duration: ___] : Pain duration: [unfilled] [Pain Location: ___] : Pain Location: [unfilled] [Pain Interferes with ADLs] : Pain interferes with activities of daily living. [Opioid] : opioid [80: Normal activity with effort; some signs or symptoms of disease.] : 80: Normal activity with effort; some signs or symptoms of disease.  [ECOG Performance Status: 1 - Restricted in physically strenuous activity but ambulatory and able to carry out work of a light or sedentary nature] : Performance Status: 1 - Restricted in physically strenuous activity but ambulatory and able to carry out work of a light or sedentary nature, e.g., light house work, office work

## 2018-09-14 ENCOUNTER — RESULT REVIEW (OUTPATIENT)
Age: 63
End: 2018-09-14

## 2018-09-14 ENCOUNTER — APPOINTMENT (OUTPATIENT)
Dept: HEMATOLOGY ONCOLOGY | Facility: CLINIC | Age: 63
End: 2018-09-14
Payer: MEDICARE

## 2018-09-14 VITALS
TEMPERATURE: 98.5 F | HEIGHT: 64 IN | BODY MASS INDEX: 39.65 KG/M2 | HEART RATE: 69 BPM | DIASTOLIC BLOOD PRESSURE: 71 MMHG | WEIGHT: 232.25 LBS | OXYGEN SATURATION: 91 % | SYSTOLIC BLOOD PRESSURE: 110 MMHG

## 2018-09-14 DIAGNOSIS — G47.00 INSOMNIA, UNSPECIFIED: ICD-10-CM

## 2018-09-14 LAB
BASOPHILS # BLD AUTO: 0.1 K/UL — SIGNIFICANT CHANGE UP (ref 0–0.2)
BASOPHILS NFR BLD AUTO: 0.9 % — SIGNIFICANT CHANGE UP (ref 0–2)
EOSINOPHIL # BLD AUTO: 0.2 K/UL — SIGNIFICANT CHANGE UP (ref 0–0.5)
EOSINOPHIL NFR BLD AUTO: 2 % — SIGNIFICANT CHANGE UP (ref 0–6)
HCT VFR BLD CALC: 46.4 % — HIGH (ref 34.5–45)
HGB BLD-MCNC: 15 G/DL — SIGNIFICANT CHANGE UP (ref 11.5–15.5)
LYMPHOCYTES # BLD AUTO: 2.9 K/UL — SIGNIFICANT CHANGE UP (ref 1–3.3)
LYMPHOCYTES # BLD AUTO: 31.8 % — SIGNIFICANT CHANGE UP (ref 13–44)
MCHC RBC-ENTMCNC: 31.8 PG — SIGNIFICANT CHANGE UP (ref 27–34)
MCHC RBC-ENTMCNC: 32.2 GM/DL — SIGNIFICANT CHANGE UP (ref 32–36)
MCV RBC AUTO: 98.6 FL — SIGNIFICANT CHANGE UP (ref 80–100)
MONOCYTES # BLD AUTO: 1 K/UL — HIGH (ref 0–0.9)
MONOCYTES NFR BLD AUTO: 10.4 % — SIGNIFICANT CHANGE UP (ref 2–14)
NEUTROPHILS # BLD AUTO: 5 K/UL — SIGNIFICANT CHANGE UP (ref 1.8–7.4)
NEUTROPHILS NFR BLD AUTO: 54.8 % — SIGNIFICANT CHANGE UP (ref 43–77)
PLATELET # BLD AUTO: 213 K/UL — SIGNIFICANT CHANGE UP (ref 150–400)
RBC # BLD: 4.71 M/UL — SIGNIFICANT CHANGE UP (ref 3.8–5.2)
RBC # FLD: 14.5 % — SIGNIFICANT CHANGE UP (ref 10.3–14.5)
WBC # BLD: 9.1 K/UL — SIGNIFICANT CHANGE UP (ref 3.8–10.5)
WBC # FLD AUTO: 9.1 K/UL — SIGNIFICANT CHANGE UP (ref 3.8–10.5)

## 2018-09-14 PROCEDURE — 99214 OFFICE O/P EST MOD 30 MIN: CPT

## 2018-09-14 RX ORDER — ONDANSETRON 8 MG/1
8 TABLET, ORALLY DISINTEGRATING ORAL EVERY 8 HOURS
Qty: 90 | Refills: 0 | Status: ACTIVE | COMMUNITY
Start: 2018-06-13 | End: 1900-01-01

## 2018-09-14 RX ORDER — ZOLPIDEM TARTRATE 5 MG/1
5 TABLET ORAL
Qty: 30 | Refills: 1 | Status: ACTIVE | COMMUNITY
Start: 2018-09-14 | End: 1900-01-01

## 2018-09-14 RX ORDER — PROCHLORPERAZINE MALEATE 10 MG/1
10 TABLET ORAL EVERY 6 HOURS
Qty: 120 | Refills: 0 | Status: ACTIVE | COMMUNITY
Start: 2018-06-28 | End: 1900-01-01

## 2018-09-16 LAB
ALBUMIN SERPL ELPH-MCNC: 3.8 G/DL
ALP BLD-CCNC: 111 U/L
ALT SERPL-CCNC: 23 U/L
ANION GAP SERPL CALC-SCNC: 12 MMOL/L
AST SERPL-CCNC: 23 U/L
BILIRUB SERPL-MCNC: 0.4 MG/DL
BUN SERPL-MCNC: 36 MG/DL
CALCIUM SERPL-MCNC: 9.4 MG/DL
CHLORIDE SERPL-SCNC: 103 MMOL/L
CO2 SERPL-SCNC: 26 MMOL/L
CREAT SERPL-MCNC: 1.32 MG/DL
GLUCOSE SERPL-MCNC: 101 MG/DL
MAGNESIUM SERPL-MCNC: 2.1 MG/DL
POTASSIUM SERPL-SCNC: 5.1 MMOL/L
PROT SERPL-MCNC: 7 G/DL
SODIUM SERPL-SCNC: 141 MMOL/L

## 2018-09-17 VITALS
WEIGHT: 232.6 LBS | DIASTOLIC BLOOD PRESSURE: 69 MMHG | HEART RATE: 73 BPM | SYSTOLIC BLOOD PRESSURE: 119 MMHG | BODY MASS INDEX: 39.93 KG/M2 | RESPIRATION RATE: 16 BRPM

## 2018-09-17 PROCEDURE — 77427 RADIATION TX MANAGEMENT X5: CPT

## 2018-09-17 NOTE — REVIEW OF SYSTEMS
[Fatigue: Grade 0] : Fatigue: Grade 0 [Localized Edema: Grade 0] : Localized Edema: Grade 0  [Hematuria: Grade 0] : Hematuria: Grade 0 [Urinary Incontinence: Grade 0] : Urinary Incontinence: Grade 0  [Urinary Tract Pain: Grade 0] : Urinary Tract Pain: Grade 0 [Urinary Urgency: Grade 0] : Urinary Urgency: Grade 0 [Skin Hyperpigmentation: Grade 0] : Skin Hyperpigmentation: Grade 0 [Dermatitis Radiation: Grade 0] : Dermatitis Radiation: Grade 0

## 2018-09-17 NOTE — PHYSICAL EXAM
[Obese] : obese [Normal] : oriented to person, place and time, the affect was normal, the mood was normal and not anxious

## 2018-09-17 NOTE — DISCUSSION/SUMMARY
[Cancer Type / Location / Histology Subtype: ________] : Cancer Type / Location / Histology Subtype: [unfilled] [Diagnosis Date (year): ____] : Diagnosis Date (year): [unfilled] [Not Applicable] : not applicable [Surgery] : Surgery: Yes [Surgery Date(s) (year): ____] : Surgery Date(s) (year): [unfilled] [Surgical Procedure / Location / Findings: _________] : Surgical Procedure / Location / Findings: [unfilled] [Radiation] : Radiation: Yes [Body Area Treated: _________] : Body Area Treated: [unfilled] [End Date (year): ____] : End Date (year): [unfilled] [Follow up with Radiation MD in _____] : Follow up with Radiation MD in [unfilled] [FreeTextEntry8] : Mary Araya

## 2018-09-21 NOTE — DISEASE MANAGEMENT
[Clinical] : TNM Stage: c [IV] : IV [FreeTextEntry4] : metastatic renal cell [TTNM] : 1b [NTNM] : 0 [MTNM] : 1 [de-identified] : 2,000 cGy [de-identified] : 2,000 cGy [de-identified] : pubic ramus

## 2018-09-25 RX ORDER — PAZOPANIB HYDROCHLORIDE 200 MG/1
200 TABLET, FILM COATED ORAL
Qty: 60 | Refills: 1 | Status: ACTIVE | COMMUNITY
Start: 2018-08-23 | End: 1900-01-01

## 2018-10-01 ENCOUNTER — MEDICATION RENEWAL (OUTPATIENT)
Age: 63
End: 2018-10-01

## 2018-10-04 ENCOUNTER — RESULT REVIEW (OUTPATIENT)
Age: 63
End: 2018-10-04

## 2018-10-04 ENCOUNTER — APPOINTMENT (OUTPATIENT)
Age: 63
End: 2018-10-04

## 2018-10-04 ENCOUNTER — APPOINTMENT (OUTPATIENT)
Dept: HEMATOLOGY ONCOLOGY | Facility: CLINIC | Age: 63
End: 2018-10-04
Payer: MEDICARE

## 2018-10-04 VITALS
HEIGHT: 64 IN | HEART RATE: 88 BPM | DIASTOLIC BLOOD PRESSURE: 74 MMHG | OXYGEN SATURATION: 95 % | WEIGHT: 228.17 LBS | TEMPERATURE: 98.1 F | SYSTOLIC BLOOD PRESSURE: 115 MMHG | BODY MASS INDEX: 38.96 KG/M2

## 2018-10-04 LAB
BASOPHILS # BLD AUTO: 0.1 K/UL — SIGNIFICANT CHANGE UP (ref 0–0.2)
BASOPHILS NFR BLD AUTO: 1.2 % — SIGNIFICANT CHANGE UP (ref 0–2)
EOSINOPHIL # BLD AUTO: 0.2 K/UL — SIGNIFICANT CHANGE UP (ref 0–0.5)
EOSINOPHIL NFR BLD AUTO: 2.6 % — SIGNIFICANT CHANGE UP (ref 0–6)
HCT VFR BLD CALC: 46.8 % — HIGH (ref 34.5–45)
HGB BLD-MCNC: 15.2 G/DL — SIGNIFICANT CHANGE UP (ref 11.5–15.5)
LYMPHOCYTES # BLD AUTO: 2.9 K/UL — SIGNIFICANT CHANGE UP (ref 1–3.3)
LYMPHOCYTES # BLD AUTO: 33.5 % — SIGNIFICANT CHANGE UP (ref 13–44)
MCHC RBC-ENTMCNC: 32.3 GM/DL — SIGNIFICANT CHANGE UP (ref 32–36)
MCHC RBC-ENTMCNC: 32.5 PG — SIGNIFICANT CHANGE UP (ref 27–34)
MCV RBC AUTO: 100.4 FL — HIGH (ref 80–100)
MONOCYTES # BLD AUTO: 1.2 K/UL — HIGH (ref 0–0.9)
MONOCYTES NFR BLD AUTO: 13.7 % — SIGNIFICANT CHANGE UP (ref 2–14)
NEUTROPHILS # BLD AUTO: 4.2 K/UL — SIGNIFICANT CHANGE UP (ref 1.8–7.4)
NEUTROPHILS NFR BLD AUTO: 49 % — SIGNIFICANT CHANGE UP (ref 43–77)
PLATELET # BLD AUTO: 169 K/UL — SIGNIFICANT CHANGE UP (ref 150–400)
RBC # BLD: 4.67 M/UL — SIGNIFICANT CHANGE UP (ref 3.8–5.2)
RBC # FLD: 14.1 % — SIGNIFICANT CHANGE UP (ref 10.3–14.5)
WBC # BLD: 8.5 K/UL — SIGNIFICANT CHANGE UP (ref 3.8–10.5)
WBC # FLD AUTO: 8.5 K/UL — SIGNIFICANT CHANGE UP (ref 3.8–10.5)

## 2018-10-04 PROCEDURE — 99214 OFFICE O/P EST MOD 30 MIN: CPT

## 2018-10-05 LAB
ALBUMIN SERPL ELPH-MCNC: 3.8 G/DL
ALP BLD-CCNC: 89 U/L
ALT SERPL-CCNC: 31 U/L
ANION GAP SERPL CALC-SCNC: 13 MMOL/L
AST SERPL-CCNC: 27 U/L
BILIRUB SERPL-MCNC: 0.7 MG/DL
BUN SERPL-MCNC: 25 MG/DL
CALCIUM SERPL-MCNC: 9.5 MG/DL
CHLORIDE SERPL-SCNC: 102 MMOL/L
CO2 SERPL-SCNC: 26 MMOL/L
CREAT SERPL-MCNC: 1.19 MG/DL
GLUCOSE SERPL-MCNC: 105 MG/DL
MAGNESIUM SERPL-MCNC: 1.9 MG/DL
POTASSIUM SERPL-SCNC: 4.3 MMOL/L
PROT SERPL-MCNC: 6.8 G/DL
SODIUM SERPL-SCNC: 141 MMOL/L

## 2018-10-07 ENCOUNTER — FORM ENCOUNTER (OUTPATIENT)
Age: 63
End: 2018-10-07

## 2018-10-08 ENCOUNTER — APPOINTMENT (OUTPATIENT)
Dept: RADIOLOGY | Facility: CLINIC | Age: 63
End: 2018-10-08

## 2018-10-08 ENCOUNTER — OUTPATIENT (OUTPATIENT)
Dept: OUTPATIENT SERVICES | Facility: HOSPITAL | Age: 63
LOS: 1 days | End: 2018-10-08
Payer: MEDICARE

## 2018-10-08 ENCOUNTER — APPOINTMENT (OUTPATIENT)
Age: 63
End: 2018-10-08

## 2018-10-08 DIAGNOSIS — Z95.2 PRESENCE OF PROSTHETIC HEART VALVE: Chronic | ICD-10-CM

## 2018-10-08 DIAGNOSIS — C79.51 SECONDARY MALIGNANT NEOPLASM OF BONE: ICD-10-CM

## 2018-10-08 DIAGNOSIS — Z98.890 OTHER SPECIFIED POSTPROCEDURAL STATES: Chronic | ICD-10-CM

## 2018-10-08 PROCEDURE — 72170 X-RAY EXAM OF PELVIS: CPT | Mod: 26

## 2018-10-08 PROCEDURE — 72170 X-RAY EXAM OF PELVIS: CPT

## 2018-10-10 ENCOUNTER — OUTPATIENT (OUTPATIENT)
Dept: OUTPATIENT SERVICES | Facility: HOSPITAL | Age: 63
LOS: 1 days | Discharge: HOME | End: 2018-10-10

## 2018-10-10 DIAGNOSIS — C64.9 MALIGNANT NEOPLASM OF UNSPECIFIED KIDNEY, EXCEPT RENAL PELVIS: ICD-10-CM

## 2018-10-10 DIAGNOSIS — Z95.2 PRESENCE OF PROSTHETIC HEART VALVE: Chronic | ICD-10-CM

## 2018-10-10 DIAGNOSIS — Z98.890 OTHER SPECIFIED POSTPROCEDURAL STATES: Chronic | ICD-10-CM

## 2018-10-18 ENCOUNTER — APPOINTMENT (OUTPATIENT)
Dept: RADIATION ONCOLOGY | Facility: CLINIC | Age: 63
End: 2018-10-18
Payer: MEDICARE

## 2018-10-18 ENCOUNTER — APPOINTMENT (OUTPATIENT)
Dept: ORTHOPEDIC SURGERY | Facility: CLINIC | Age: 63
End: 2018-10-18
Payer: MEDICARE

## 2018-10-18 ENCOUNTER — APPOINTMENT (OUTPATIENT)
Dept: HEMATOLOGY ONCOLOGY | Facility: CLINIC | Age: 63
End: 2018-10-18
Payer: MEDICARE

## 2018-10-18 VITALS
OXYGEN SATURATION: 94 % | DIASTOLIC BLOOD PRESSURE: 64 MMHG | WEIGHT: 231 LBS | BODY MASS INDEX: 39.65 KG/M2 | HEART RATE: 90 BPM | RESPIRATION RATE: 16 BRPM | SYSTOLIC BLOOD PRESSURE: 102 MMHG

## 2018-10-18 VITALS
HEART RATE: 91 BPM | BODY MASS INDEX: 39.83 KG/M2 | SYSTOLIC BLOOD PRESSURE: 123 MMHG | DIASTOLIC BLOOD PRESSURE: 79 MMHG | TEMPERATURE: 98.2 F | WEIGHT: 232.06 LBS

## 2018-10-18 PROCEDURE — 99204 OFFICE O/P NEW MOD 45 MIN: CPT

## 2018-10-18 PROCEDURE — 99024 POSTOP FOLLOW-UP VISIT: CPT

## 2018-10-18 NOTE — REVIEW OF SYSTEMS
[Anal Pain: Grade 0] : Anal Pain: Grade 0 [Constipation: Grade 0] : Constipation: Grade 0 [Diarrhea: Grade 0] : Diarrhea: Grade 0 [Fecal Incontinence: Grade 0] : Fecal Incontinence: Grade 0 [Nausea: Grade 2 - Oral intake decreased without significant weight loss, dehydration or malnutrition] : Nausea: Grade 2 - Oral intake decreased without significant weight loss, dehydration or malnutrition [Fatigue: Grade 1 - Fatigue relieved by rest] : Fatigue: Grade 1 - Fatigue relieved by rest [Hematuria: Grade 0] : Hematuria: Grade 0 [Urinary Incontinence: Grade 0] : Urinary Incontinence: Grade 0  [Urinary Retention: Grade 0] : Urinary Retention: Grade 0 [Urinary Tract Pain: Grade 0] : Urinary Tract Pain: Grade 0 [Urinary Urgency: Grade 0] : Urinary Urgency: Grade 0 [Urinary Frequency: Grade 0] : Urinary Frequency: Grade 0 [Skin Hyperpigmentation: Grade 0] : Skin Hyperpigmentation: Grade 0 [Dermatitis Radiation: Grade 0] : Dermatitis Radiation: Grade 0

## 2018-10-18 NOTE — VITALS
[Maximal Pain Intensity: 10/10] : 10/10 [Least Pain Intensity: 5/10] : 5/10 [Pain Description/Quality: ___] : Pain description/quality: [unfilled] [Pain Duration: ___] : Pain duration: [unfilled] [Pain Location: ___] : Pain Location: [unfilled] [Pain Interferes with ADLs] : Pain interferes with activities of daily living. [Opioid] : opioid [70: Cares for self; unalbe to carry on normal activity or do active work.] : 70: Cares for self; unable to carry on normal activity or do active work. [ECOG Performance Status: 2 - Ambulatory and capable of all self care but unable to carry out any work activities] : Performance Status: 2 - Ambulatory and capable of all self care but unable to carry out any work activities. Up and about more than 50% of waking hours

## 2018-10-18 NOTE — REASON FOR VISIT
[Post-Treatment Evaluation] : post-treatment evaluation for [Other: ___] : [unfilled] [Family Member] : family member

## 2018-10-20 NOTE — PHYSICAL EXAM
[Normal] : oriented to person, place and time, the affect was normal, the mood was normal and not anxious [de-identified] : deferred [FreeTextEntry1] : deferred [de-identified] : deferred [de-identified] : some  tenderness in area of right groin, but no skin changes.

## 2018-10-20 NOTE — DISEASE MANAGEMENT
[Clinical] : TNM Stage: c [IV] : IV [FreeTextEntry4] : Metastatic renal cell [TTNM] : 1b [NTNM] : 0 [MTNM] : 1

## 2018-10-20 NOTE — LETTER CLOSING
[Sincerely yours,] : Sincerely yours, [FreeTextEntry3] : Jeffrey Barbosa MD\par Physician in Chief\par Department of Radiation Medicine\par [facility]\par [facility]\par \par  of Radiation Medicine\par [facility]\par at  [facility] [place]\par \par Radiation \par [facility]/\par [facility] [place]\par [address]\par [place], [place] [address]\par \par Tel: [phone]\par Fax: [phone]\par

## 2018-10-20 NOTE — LETTER GREETING
[Dear Doctor] : Dear Doctor, [Follow-Up] : Your patient, [unfilled] was seen in my office today for follow-up [Please see my note below.] : Please see my note below. [FreeTextEntry2] : Holley Wise MD

## 2018-10-20 NOTE — HISTORY OF PRESENT ILLNESS
[FreeTextEntry1] : 63 year old female returns today for post treatment evaluation, s/p 2,000cGy for a [id number] lV metastatic renal cell carcinoma to bone pubic ramus completed 9/17/18. Was on Votrient with Dr. Wise currently on hold. Current pain 7-8/10 to right groin with fentanyl patch 50 mcg with oxycodone for breakthrough. C/o fatigue, no weight loss noted. Denies change in bowel & urination. \par

## 2018-10-20 NOTE — ASSESSMENT
[Metastatic disease without local control] : Metastatic disease without local control [FreeTextEntry1] : May need more time for clearance of metastatic disease in right pubic ramus and bone remodeling.

## 2018-10-22 ENCOUNTER — LABORATORY RESULT (OUTPATIENT)
Age: 63
End: 2018-10-22

## 2018-10-22 ENCOUNTER — RESULT REVIEW (OUTPATIENT)
Age: 63
End: 2018-10-22

## 2018-10-22 ENCOUNTER — APPOINTMENT (OUTPATIENT)
Dept: HEMATOLOGY ONCOLOGY | Facility: CLINIC | Age: 63
End: 2018-10-22
Payer: MEDICARE

## 2018-10-22 VITALS
HEART RATE: 86 BPM | DIASTOLIC BLOOD PRESSURE: 61 MMHG | TEMPERATURE: 86 F | WEIGHT: 233 LBS | HEIGHT: 64 IN | SYSTOLIC BLOOD PRESSURE: 95 MMHG | BODY MASS INDEX: 39.78 KG/M2

## 2018-10-22 LAB
BASOPHILS # BLD AUTO: 0.1 K/UL — SIGNIFICANT CHANGE UP (ref 0–0.2)
BASOPHILS NFR BLD AUTO: 0.5 % — SIGNIFICANT CHANGE UP (ref 0–2)
EOSINOPHIL # BLD AUTO: 0.5 K/UL — SIGNIFICANT CHANGE UP (ref 0–0.5)
EOSINOPHIL NFR BLD AUTO: 4 % — SIGNIFICANT CHANGE UP (ref 0–6)
HCT VFR BLD CALC: 41 % — SIGNIFICANT CHANGE UP (ref 34.5–45)
HGB BLD-MCNC: 13 G/DL — SIGNIFICANT CHANGE UP (ref 11.5–15.5)
LYMPHOCYTES # BLD AUTO: 2.5 K/UL — SIGNIFICANT CHANGE UP (ref 1–3.3)
LYMPHOCYTES # BLD AUTO: 22.4 % — SIGNIFICANT CHANGE UP (ref 13–44)
MCHC RBC-ENTMCNC: 31.7 GM/DL — LOW (ref 32–36)
MCHC RBC-ENTMCNC: 32.6 PG — SIGNIFICANT CHANGE UP (ref 27–34)
MCV RBC AUTO: 102.7 FL — HIGH (ref 80–100)
MONOCYTES # BLD AUTO: 1.5 K/UL — HIGH (ref 0–0.9)
MONOCYTES NFR BLD AUTO: 13.6 % — SIGNIFICANT CHANGE UP (ref 2–14)
NEUTROPHILS # BLD AUTO: 6.7 K/UL — SIGNIFICANT CHANGE UP (ref 1.8–7.4)
NEUTROPHILS NFR BLD AUTO: 59.5 % — SIGNIFICANT CHANGE UP (ref 43–77)
PLATELET # BLD AUTO: 203 K/UL — SIGNIFICANT CHANGE UP (ref 150–400)
RBC # BLD: 4 M/UL — SIGNIFICANT CHANGE UP (ref 3.8–5.2)
RBC # FLD: 14.4 % — SIGNIFICANT CHANGE UP (ref 10.3–14.5)
WBC # BLD: 11.3 K/UL — HIGH (ref 3.8–10.5)
WBC # FLD AUTO: 11.3 K/UL — HIGH (ref 3.8–10.5)

## 2018-10-22 PROCEDURE — 99215 OFFICE O/P EST HI 40 MIN: CPT

## 2018-10-22 RX ORDER — ZOLPIDEM TARTRATE 10 MG/1
10 TABLET ORAL
Qty: 30 | Refills: 0 | Status: ACTIVE | COMMUNITY
Start: 2018-10-22 | End: 1900-01-01

## 2018-10-22 RX ORDER — FENTANYL 25 UG/H
25 PATCH, EXTENDED RELEASE TRANSDERMAL
Qty: 10 | Refills: 0 | Status: DISCONTINUED | COMMUNITY
Start: 2018-09-14 | End: 2018-10-22

## 2018-10-22 RX ORDER — FENTANYL 37.5 UG/H
37.5 PATCH, EXTENDED RELEASE TRANSDERMAL
Qty: 10 | Refills: 0 | Status: DISCONTINUED | COMMUNITY
Start: 2018-05-03 | End: 2018-10-22

## 2018-10-22 RX ORDER — FENTANYL 12 UG/H
12 PATCH, EXTENDED RELEASE TRANSDERMAL
Qty: 10 | Refills: 0 | Status: DISCONTINUED | COMMUNITY
Start: 2018-09-14 | End: 2018-10-22

## 2018-10-23 LAB
ALBUMIN SERPL ELPH-MCNC: 3.5 G/DL
ALP BLD-CCNC: 81 U/L
ALT SERPL-CCNC: 31 U/L
ANION GAP SERPL CALC-SCNC: 10 MMOL/L
AST SERPL-CCNC: 22 U/L
BILIRUB SERPL-MCNC: 0.3 MG/DL
BUN SERPL-MCNC: 35 MG/DL
CALCIUM SERPL-MCNC: 9.2 MG/DL
CHLORIDE SERPL-SCNC: 106 MMOL/L
CO2 SERPL-SCNC: 27 MMOL/L
CREAT SERPL-MCNC: 1.03 MG/DL
GLUCOSE SERPL-MCNC: 83 MG/DL
MAGNESIUM SERPL-MCNC: 1.8 MG/DL
POTASSIUM SERPL-SCNC: 4.5 MMOL/L
PROT SERPL-MCNC: 6.1 G/DL
SODIUM SERPL-SCNC: 143 MMOL/L

## 2018-10-26 RX ORDER — FOLIC ACID 1 MG/1
1 TABLET ORAL DAILY
Qty: 90 | Refills: 3 | Status: ACTIVE | COMMUNITY
Start: 2018-10-26 | End: 1900-01-01

## 2018-10-27 ENCOUNTER — APPOINTMENT (OUTPATIENT)
Dept: CT IMAGING | Facility: CLINIC | Age: 63
End: 2018-10-27

## 2018-10-30 ENCOUNTER — LABORATORY RESULT (OUTPATIENT)
Age: 63
End: 2018-10-30

## 2018-10-30 ENCOUNTER — APPOINTMENT (OUTPATIENT)
Age: 63
End: 2018-10-30

## 2018-10-31 DIAGNOSIS — Z51.11 ENCOUNTER FOR ANTINEOPLASTIC CHEMOTHERAPY: ICD-10-CM

## 2018-10-31 DIAGNOSIS — R11.2 NAUSEA WITH VOMITING, UNSPECIFIED: ICD-10-CM

## 2018-10-31 DIAGNOSIS — C79.51 SECONDARY MALIGNANT NEOPLASM OF BONE: ICD-10-CM

## 2018-11-02 ENCOUNTER — FORM ENCOUNTER (OUTPATIENT)
Age: 63
End: 2018-11-02

## 2018-11-03 ENCOUNTER — APPOINTMENT (OUTPATIENT)
Dept: CT IMAGING | Facility: CLINIC | Age: 63
End: 2018-11-03
Payer: MEDICARE

## 2018-11-03 ENCOUNTER — OUTPATIENT (OUTPATIENT)
Dept: OUTPATIENT SERVICES | Facility: HOSPITAL | Age: 63
LOS: 1 days | End: 2018-11-03
Payer: MEDICARE

## 2018-11-03 DIAGNOSIS — Z95.2 PRESENCE OF PROSTHETIC HEART VALVE: Chronic | ICD-10-CM

## 2018-11-03 DIAGNOSIS — C64.9 MALIGNANT NEOPLASM OF UNSPECIFIED KIDNEY, EXCEPT RENAL PELVIS: ICD-10-CM

## 2018-11-03 DIAGNOSIS — Z98.890 OTHER SPECIFIED POSTPROCEDURAL STATES: Chronic | ICD-10-CM

## 2018-11-03 DIAGNOSIS — C79.51 SECONDARY MALIGNANT NEOPLASM OF BONE: ICD-10-CM

## 2018-11-03 PROCEDURE — 71260 CT THORAX DX C+: CPT | Mod: 26

## 2018-11-03 PROCEDURE — 74178 CT ABD&PLV WO CNTR FLWD CNTR: CPT | Mod: 26

## 2018-11-03 PROCEDURE — 74178 CT ABD&PLV WO CNTR FLWD CNTR: CPT

## 2018-11-03 PROCEDURE — 71260 CT THORAX DX C+: CPT

## 2018-11-06 ENCOUNTER — MEDICATION RENEWAL (OUTPATIENT)
Age: 63
End: 2018-11-06

## 2018-11-07 ENCOUNTER — RX RENEWAL (OUTPATIENT)
Age: 63
End: 2018-11-07

## 2018-11-07 RX ORDER — FLUTICASONE PROPIONATE 50 UG/1
50 SPRAY, METERED NASAL DAILY
Qty: 16 | Refills: 3 | Status: ACTIVE | COMMUNITY
Start: 2018-03-27 | End: 1900-01-01

## 2018-11-08 ENCOUNTER — APPOINTMENT (OUTPATIENT)
Age: 63
End: 2018-11-08

## 2018-11-09 ENCOUNTER — OUTPATIENT (OUTPATIENT)
Dept: OUTPATIENT SERVICES | Facility: HOSPITAL | Age: 63
LOS: 1 days | Discharge: HOME | End: 2018-11-09

## 2018-11-09 DIAGNOSIS — Z95.2 PRESENCE OF PROSTHETIC HEART VALVE: Chronic | ICD-10-CM

## 2018-11-09 DIAGNOSIS — C64.9 MALIGNANT NEOPLASM OF UNSPECIFIED KIDNEY, EXCEPT RENAL PELVIS: ICD-10-CM

## 2018-11-09 DIAGNOSIS — C79.51 SECONDARY MALIGNANT NEOPLASM OF BONE: ICD-10-CM

## 2018-11-09 DIAGNOSIS — Z98.890 OTHER SPECIFIED POSTPROCEDURAL STATES: Chronic | ICD-10-CM

## 2018-11-14 ENCOUNTER — RESULT REVIEW (OUTPATIENT)
Age: 63
End: 2018-11-14

## 2018-11-14 ENCOUNTER — APPOINTMENT (OUTPATIENT)
Dept: HEMATOLOGY ONCOLOGY | Facility: CLINIC | Age: 63
End: 2018-11-14
Payer: MEDICARE

## 2018-11-14 VITALS
SYSTOLIC BLOOD PRESSURE: 94 MMHG | BODY MASS INDEX: 37.75 KG/M2 | HEART RATE: 85 BPM | OXYGEN SATURATION: 94 % | WEIGHT: 221.12 LBS | DIASTOLIC BLOOD PRESSURE: 59 MMHG | HEIGHT: 64 IN | TEMPERATURE: 98.3 F

## 2018-11-14 DIAGNOSIS — J44.9 CHRONIC OBSTRUCTIVE PULMONARY DISEASE, UNSPECIFIED: ICD-10-CM

## 2018-11-14 DIAGNOSIS — L27.1 LOCALIZED SKIN ERUPTION DUE TO DRUGS AND MEDICAMENTS TAKEN INTERNALLY: ICD-10-CM

## 2018-11-14 DIAGNOSIS — R10.31 RIGHT LOWER QUADRANT PAIN: ICD-10-CM

## 2018-11-14 LAB
BASOPHILS # BLD AUTO: 0.1 K/UL — SIGNIFICANT CHANGE UP (ref 0–0.2)
BASOPHILS NFR BLD AUTO: 0.4 % — SIGNIFICANT CHANGE UP (ref 0–2)
EOSINOPHIL # BLD AUTO: 0.2 K/UL — SIGNIFICANT CHANGE UP (ref 0–0.5)
EOSINOPHIL NFR BLD AUTO: 1.4 % — SIGNIFICANT CHANGE UP (ref 0–6)
HCT VFR BLD CALC: 43.8 % — SIGNIFICANT CHANGE UP (ref 34.5–45)
HGB BLD-MCNC: 13.8 G/DL — SIGNIFICANT CHANGE UP (ref 11.5–15.5)
LYMPHOCYTES # BLD AUTO: 26.2 % — SIGNIFICANT CHANGE UP (ref 13–44)
LYMPHOCYTES # BLD AUTO: 3.1 K/UL — SIGNIFICANT CHANGE UP (ref 1–3.3)
MCHC RBC-ENTMCNC: 31.5 GM/DL — LOW (ref 32–36)
MCHC RBC-ENTMCNC: 32.1 PG — SIGNIFICANT CHANGE UP (ref 27–34)
MCV RBC AUTO: 101.8 FL — HIGH (ref 80–100)
MONOCYTES # BLD AUTO: 1.5 K/UL — HIGH (ref 0–0.9)
MONOCYTES NFR BLD AUTO: 12.8 % — SIGNIFICANT CHANGE UP (ref 2–14)
NEUTROPHILS # BLD AUTO: 7 K/UL — SIGNIFICANT CHANGE UP (ref 1.8–7.4)
NEUTROPHILS NFR BLD AUTO: 59.1 % — SIGNIFICANT CHANGE UP (ref 43–77)
PLATELET # BLD AUTO: 270 K/UL — SIGNIFICANT CHANGE UP (ref 150–400)
RBC # BLD: 4.3 M/UL — SIGNIFICANT CHANGE UP (ref 3.8–5.2)
RBC # FLD: 13.2 % — SIGNIFICANT CHANGE UP (ref 10.3–14.5)
WBC # BLD: 11.9 K/UL — HIGH (ref 3.8–10.5)
WBC # FLD AUTO: 11.9 K/UL — HIGH (ref 3.8–10.5)

## 2018-11-14 PROCEDURE — 99215 OFFICE O/P EST HI 40 MIN: CPT

## 2018-11-14 NOTE — ADDENDUM
[FreeTextEntry1] : I, Dalila Franco, acted solely as a scribe for Dr. Holley Wise on this date 11/14/18.

## 2018-11-14 NOTE — HISTORY OF PRESENT ILLNESS
[de-identified] : The patient was diagnosed with recurrent renal cell carcinoma in March 2018 at the age of 62.  She was initially diagnosed with B/L clear cell renal cell carcinoma in March 2017 and has been followed by Urology, Dr. Rodney Rodriguez.  She underwent percutaneous cryoablation (considered a high surgical risk due to her multiple comorbidities) of a right renal mass in March 2017 and a left renal mass in May 2017 by IR, Dr. Waqar Landa.  Pathology was suspicious for clear cell carcinoma.  Her first follow up CT A/P on 8/30/17 stated interval successful cryoablation of bilateral solid renal masses without evidence of residual or recurrent disease.  Follow up MRI on 3/23/18 showed a posterior left interpolar lesion measures 3.1 x 3 cm, previously measuring 3.8 x 3.4 cm on 8/30/2017, and demonstrates an enhancing mural nodule, suspicious for residual/recurrent disease. New 1.7 x 1.2 cm enhancing lesion in the left posterior pararenal space abutting the transversalis fascia, consistent with residual disease. She was initially being considered for repeat cryoablation of the left kidney lesion, but in recent months she started using a treadmill in an attempt to improve her health and lose weight when she thought she pulled a muscle in the right groin. The pain progressively worsened and she presented to [facility] ED. A CT scan 4/27/18 showed a new 4 cm lytic lesion in the inferior right pubic ramus with small soft tissue component suspicious for solitary metastatic disease. It also showed small bilateral pulmonary nodules which are nonspecific but may potentially represent metastatic disease.  She was evaluated by Dr Barbosa on 5/3/18 for palliative RT of this pubic ramus lesion.  She presents today for consideration of systemic treatment.  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  Initially started on 5/27/18.  40mg treatment held between 6/20/18 - 7/22/18, reduced to 20mg starting 7/23/18. [de-identified] : Patient presents for follow up.  She started pazopanib on 8/29/18, s/p cabozantinib that was discontinued due to H/F syndrome.  +hand/foot syndrome much improved.b+ Pain in right groin s/t metastatic disease, was improved s/p palliative RT, but again severe with new severe pain in the right buttock.  + Hair thinning. + Fatigue.  + Constipation +Nausea.  Appetite poor.    \par \par 11/3/18 CT C/A/P: Bilateral pulmonary nodules, some of which have slightly increased in size 8/16/18. Stable left renal and perinephric masses. Interval worsening osseous metastasis involving the right pubic ramus.

## 2018-11-14 NOTE — ASSESSMENT
[Palliative] : Goals of care discussed with patient: Palliative [FreeTextEntry1] :  risk stratification: good\par -Good risk: None of above risk factors present.\par -Intermediate risk: 1 or 2 of above risk factors present.\par -Poor risk: 3 or more risk factors present.\par \par Karnofsky performance score <80 -no\par Time from original diagnosis to initiation of targeted therapy <1 year  - no\par Hemoglobin less than the lower limit of normal -no\par Serum calcium greater than the upper limit of normal -no\par Neutrophil count greater than the upper limit of normal -no\par Platelet count greater than the upper limit of normal \par \par Systemic therapy is initiated promptly when metastatic or locally advanced disease, is present. However, for asymptomatic patients with a limited disease burden and no poor prognostic features, close active surveillance is an alternative for those wanting to defer the initiation of therapy until disease progression. Immunotherapy with checkpoint inhibitors and molecularly targeted therapy are the primary systemic modalities. For intermediate- and poor-risk patients for whom the combination of nivolumab plus ipilimumab is not available, antiangiogenic targeted therapy is the primary option. Among the available agents, the preferred agents for initial therapy are cabozantinib, pazopanib and sunitinib. There are no data on nivolumab monotherapy in the front-line setting, which may also be an alternative for some patients. If POD, would reserve nivolumab monotherapy for second line. Starting dose of cabozantinib is 60 mg; however, given her KPS and comorbidities, will opt to start her at 40 mg and titrate up as tolerated. For those with good-risk disease, antiangiogenic targeted therapy is the primary option.\par

## 2018-11-14 NOTE — RESULTS/DATA
[FreeTextEntry1] : 11/3/18 CT C/A/P: Bilateral pulmonary nodules, some of which have slightly increased in size 8/16/18. Stable left renal and perinephric masses. Interval worsening osseous metastasis involving the right pubic ramus. \par \par 5/11/18 Pathology - right pubic lytic lesion, needle core biopsy:  Few clusters of neoplastic cells, histologically compatible with renal cell carcinoma - clear cell type, in background of necrosis and hemorrhage. \par \par 5/10/18 PET:  FDG avid soft tissue nodule in the left posterior pararenal space consistent with recurrent disease. Left lower pole renal lesion with enhancing mural nodule is better seen on MRI. Right lower pole renal lesion with central fat and peripheral FDG avidity is nonspecific, probably represents changes related to ablation. Continued follow-up is suggested. Expansile lytic lesion right pubic symphysis involving the anterior acetabulum and inferior pubic ramus, suspicious for metastasis. \par \par 4/26/18 CT Abdomen: Bibasilar micronodules and calcified granulomas. Bibasilar discoid atelectasis and/or scarring. Redemonstrated complex lesion in the left posterior perirenal space abutting Gerota's fascia is consistent with recurrent disease, better characterized on prior MRI. Again seen is the mid posterior left renal complex cystic lesion showing mural nodularity as seen on prior MRI. In the right posterior perirenal space, encapsulated fat with associated stranding likely reflects fat necrosis from prior ablation. New 4 cm lytic lesion in the inferior right pubic ramus with small soft tissue component and system with metastatic disease.\par \par 4/26/18 U/S RLE: No evidence for acute DVT in the right lower extremity deep venous systems. \par \par 3/23/18 MRI A/P: 2.2 right adrenal adenoma. 2.1 cm right myelolipoma. Right kidney: Posterior right lower pole lesion, measuring 1.6 x 1.6 cm, unchanged in size when compared to 8/30/2017 and does not demonstrate enhancement. Postoperative changes in the right lower pole including fat necrosis. Right renal cyst. Left kidney: Posterior left interpolar lesion measures 3.1 x 3 cm, previously measuring 3.8 x 3.4 cm on 8/30/2017, and demonstrates an enhancing mural nodule, suspicious for residual/recurrent disease. New 1.7 x 1.2 cm enhancing lesion in the left posterior pararenal space abutting the transversalis fascia, consistent with residual disease. \par \par 8/30/17 CT A/P: Postprocedural changes with halo of probable fat necrosis in the posterior right lower pole and posterior left interpolar region at the site of previously seen enhancing renal masses. The masses have slightly decreased in size and no longer demonstrate enhancement. For example: Posterior right lower pole lesion, measures 1.6 x 1.6 cm, previously 1.9 x 1.8 cm.  Posterior left interpolar lesion, measures 3.8 x 3.4 cm, previously 4.0 x 3.5 cm.  Since January 17, 2017, interval successful cryoablation of bilateral solid renal masses without evidence of residual or recurrent disease.  \par \par 5/16/17 Pathology: Kidney, left, mass, biopsy: Rare atypical cells, suspicious for renal cell carcinoma.  The biopsy shows predominantly necrotic material with rare atypical cells. The atypical cells are positive for PAX-8. The above finding are suspicious for renal cell carcinoma.\par \par 3/7/17 Pathology: KIDNEY, RIGHT LOWER, CT GUIDED CORE BX AND FNA SUSPICIOUS FOR NEOPLASM.  Cytology slides and cell block showing scant material, suspicious\par for clear cell carcinoma.  Core biopsy : Non-diagnostic material.  Although qualitatively suspicious as mentioned above, the quantity is very scant for any further studies for sub classification.

## 2018-11-14 NOTE — REVIEW OF SYSTEMS
[Chills] : chills [Night Sweats] : night sweats [Fatigue] : fatigue [Recent Change In Weight] : ~T recent weight change [Hoarseness] : hoarseness [SOB on Exertion] : shortness of breath during exertion [Constipation] : constipation [Insomnia] : insomnia [Anxiety] : anxiety [Depression] : depression [Hot Flashes] : hot flashes [Negative] : Allergic/Immunologic

## 2018-11-14 NOTE — PHYSICAL EXAM
[Restricted in physically strenuous activity but ambulatory and able to carry out work of a light or sedentary nature] : Status 1- Restricted in physically strenuous activity but ambulatory and able to carry out work of a light or sedentary nature, e.g., light house work, office work [Normal] : affect appropriate [de-identified] : B/L plantar feet with few crescenteric blisters, L>R.  Erythema now resolved.  Xeroderma B/L hands

## 2018-11-15 LAB
ALBUMIN SERPL ELPH-MCNC: 3.9 G/DL
ALP BLD-CCNC: 93 U/L
ALT SERPL-CCNC: 19 U/L
ANION GAP SERPL CALC-SCNC: 13 MMOL/L
AST SERPL-CCNC: 18 U/L
BILIRUB SERPL-MCNC: 0.5 MG/DL
BUN SERPL-MCNC: 37 MG/DL
CALCIUM SERPL-MCNC: 10.2 MG/DL
CHLORIDE SERPL-SCNC: 103 MMOL/L
CO2 SERPL-SCNC: 26 MMOL/L
CREAT SERPL-MCNC: 1.28 MG/DL
GLUCOSE SERPL-MCNC: 79 MG/DL
MAGNESIUM SERPL-MCNC: 2.1 MG/DL
POTASSIUM SERPL-SCNC: 4.8 MMOL/L
PROT SERPL-MCNC: 7.1 G/DL
SODIUM SERPL-SCNC: 142 MMOL/L

## 2018-11-28 ENCOUNTER — APPOINTMENT (OUTPATIENT)
Dept: HEMATOLOGY ONCOLOGY | Facility: CLINIC | Age: 63
End: 2018-11-28

## 2018-12-03 PROBLEM — R11.0 CHEMOTHERAPY-INDUCED NAUSEA: Status: ACTIVE | Noted: 2018-11-14

## 2018-12-04 ENCOUNTER — LABORATORY RESULT (OUTPATIENT)
Age: 63
End: 2018-12-04

## 2018-12-04 ENCOUNTER — APPOINTMENT (OUTPATIENT)
Age: 63
End: 2018-12-04

## 2018-12-04 ENCOUNTER — APPOINTMENT (OUTPATIENT)
Dept: RADIATION ONCOLOGY | Facility: CLINIC | Age: 63
End: 2018-12-04
Payer: MEDICARE

## 2018-12-04 VITALS
BODY MASS INDEX: 37.59 KG/M2 | SYSTOLIC BLOOD PRESSURE: 121 MMHG | WEIGHT: 219 LBS | HEART RATE: 73 BPM | OXYGEN SATURATION: 95 % | RESPIRATION RATE: 16 BRPM | TEMPERATURE: 98.4 F | DIASTOLIC BLOOD PRESSURE: 71 MMHG

## 2018-12-04 DIAGNOSIS — T45.1X5A NAUSEA: ICD-10-CM

## 2018-12-04 DIAGNOSIS — R11.0 NAUSEA: ICD-10-CM

## 2018-12-04 PROCEDURE — 99213 OFFICE O/P EST LOW 20 MIN: CPT

## 2018-12-04 NOTE — PHYSICAL EXAM
[Normal] : oriented to person, place and time, the affect was normal, the mood was normal and not anxious [de-identified] : deferred [FreeTextEntry1] : deferred [de-identified] : deferred [de-identified] : some  tenderness in area of right pelvis, but no skin changes.; relies on walker for ambulatory support.

## 2018-12-04 NOTE — REVIEW OF SYSTEMS
[Anal Pain: Grade 0] : Anal Pain: Grade 0 [Constipation: Grade 0] : Constipation: Grade 0 [Diarrhea: Grade 0] : Diarrhea: Grade 0 [Fecal Incontinence: Grade 0] : Fecal Incontinence: Grade 0 [Nausea: Grade 2 - Oral intake decreased without significant weight loss, dehydration or malnutrition] : Nausea: Grade 2 - Oral intake decreased without significant weight loss, dehydration or malnutrition [Fatigue: Grade 1 - Fatigue relieved by rest] : Fatigue: Grade 1 - Fatigue relieved by rest [Hematuria: Grade 0] : Hematuria: Grade 0 [Urinary Incontinence: Grade 0] : Urinary Incontinence: Grade 0  [Urinary Retention: Grade 0] : Urinary Retention: Grade 0 [Urinary Tract Pain: Grade 0] : Urinary Tract Pain: Grade 0 [Urinary Urgency: Grade 0] : Urinary Urgency: Grade 0 [Urinary Frequency: Grade 0] : Urinary Frequency: Grade 0 [Skin Hyperpigmentation: Grade 0] : Skin Hyperpigmentation: Grade 0 [Dermatitis Radiation: Grade 0] : Dermatitis Radiation: Grade 0 [Negative] : Endocrine

## 2018-12-04 NOTE — LETTER CLOSING
[Sincerely yours,] : Sincerely yours, [FreeTextEntry3] : Jeffrey Barbosa MD\par Physician in Chief\par Department of Radiation Medicine\par [facility] [place]\par [facility] [place]\par \par  of Radiation Medicine\par [facility]\par at  [facility]\par \par Radiation \par [facility]par [facility] [place]\par [address]\par [place], [place] [address]\par \par Tel: [phone]\par Fax: [phone]\par

## 2018-12-04 NOTE — VITALS
[Pain Description/Quality: ___] : Pain description/quality: [unfilled] [Pain Duration: ___] : Pain duration: [unfilled] [Pain Location: ___] : Pain Location: [unfilled] [Pain Interferes with ADLs] : Pain interferes with activities of daily living. [Opioid] : opioid [70: Cares for self; unalbe to carry on normal activity or do active work.] : 70: Cares for self; unable to carry on normal activity or do active work. [ECOG Performance Status: 2 - Ambulatory and capable of all self care but unable to carry out any work activities] : Performance Status: 2 - Ambulatory and capable of all self care but unable to carry out any work activities. Up and about more than 50% of waking hours [Maximal Pain Intensity: 7/10] : 7/10 [Least Pain Intensity: 4/10] : 4/10

## 2018-12-04 NOTE — DISEASE MANAGEMENT
[Clinical] : TNM Stage: c [IV] : IV [FreeTextEntry4] : Metastatic renal cell [TTNM] : 1b [NTNM] : 0 [MTNM] : 1 [de-identified] : 4000cGy [de-identified] : right pubic ramus

## 2018-12-04 NOTE — ASSESSMENT
[Metastatic disease without local control] : Metastatic disease without local control [FreeTextEntry1] : metastatic disease and non-malignant pathololgy perhaps resolving slowly , but still requires narcotic analgesic and systemic oncolytic therapy.

## 2018-12-05 DIAGNOSIS — Z51.11 ENCOUNTER FOR ANTINEOPLASTIC CHEMOTHERAPY: ICD-10-CM

## 2018-12-07 ENCOUNTER — APPOINTMENT (OUTPATIENT)
Dept: ENDOCRINOLOGY | Facility: CLINIC | Age: 63
End: 2018-12-07

## 2018-12-10 ENCOUNTER — OUTPATIENT (OUTPATIENT)
Dept: OUTPATIENT SERVICES | Facility: HOSPITAL | Age: 63
LOS: 1 days | Discharge: HOME | End: 2018-12-10

## 2018-12-10 DIAGNOSIS — C64.9 MALIGNANT NEOPLASM OF UNSPECIFIED KIDNEY, EXCEPT RENAL PELVIS: ICD-10-CM

## 2018-12-10 DIAGNOSIS — C79.51 SECONDARY MALIGNANT NEOPLASM OF BONE: ICD-10-CM

## 2018-12-10 DIAGNOSIS — Z98.890 OTHER SPECIFIED POSTPROCEDURAL STATES: Chronic | ICD-10-CM

## 2018-12-10 DIAGNOSIS — Z95.2 PRESENCE OF PROSTHETIC HEART VALVE: Chronic | ICD-10-CM

## 2018-12-19 ENCOUNTER — LABORATORY RESULT (OUTPATIENT)
Age: 63
End: 2018-12-19

## 2018-12-19 ENCOUNTER — APPOINTMENT (OUTPATIENT)
Dept: HEMATOLOGY ONCOLOGY | Facility: CLINIC | Age: 63
End: 2018-12-19
Payer: MEDICARE

## 2018-12-19 VITALS
HEIGHT: 64 IN | WEIGHT: 214.04 LBS | DIASTOLIC BLOOD PRESSURE: 63 MMHG | HEART RATE: 98 BPM | SYSTOLIC BLOOD PRESSURE: 104 MMHG | TEMPERATURE: 98.3 F | OXYGEN SATURATION: 96 % | BODY MASS INDEX: 36.54 KG/M2

## 2018-12-19 DIAGNOSIS — Z79.899 OTHER LONG TERM (CURRENT) DRUG THERAPY: ICD-10-CM

## 2018-12-19 DIAGNOSIS — K59.09 OTHER CONSTIPATION: ICD-10-CM

## 2018-12-19 DIAGNOSIS — E11.42 TYPE 2 DIABETES MELLITUS WITH DIABETIC POLYNEUROPATHY: ICD-10-CM

## 2018-12-19 PROCEDURE — 99205 OFFICE O/P NEW HI 60 MIN: CPT

## 2018-12-19 NOTE — PHYSICAL EXAM
[Ambulatory and capable of all self care but unable to carry out any work activities] : Status 2- Ambulatory and capable of all self care but unable to carry out any work activities. Up and about more than 50% of waking hours

## 2018-12-19 NOTE — HISTORY OF PRESENT ILLNESS
[de-identified] : 63 year old woman w h/o MRCC,osseous mets,  s/p recent RT to R hip lesion, has multiple complaints, predominantly bone/joint pain, nausea, along w poor appetite wt loss of at least 60 lbs , poor sleep..Pain and nausea is predominant.Pain especially severe when she takes steps.Needs to use a walker to lessen the burden.Told by family she may be helped w MM re her poor appetite ,food aversion, pain and poor sleep.She has not tried it since over 35 y ago..Other issues involve diabetic neuropathy.

## 2018-12-19 NOTE — REASON FOR VISIT
[Initial Consultation] : an initial consultation [Family Member] : family member [FreeTextEntry2] : Medical Marijuana cert.

## 2018-12-19 NOTE — ASSESSMENT
[FreeTextEntry1] : Malignant bone pain in setting of met RCC w multiple co-morbiditiesPredominant nausea and pain, w poor appetite,insomnia.Detailed d/w pt and daughter re risks and benefits of MM use.Pt will start w 1/1 CBD/THC to match her symptom complex.Re-assessment in 1 month.[facility] d/w pt is to focus on QoL.As she is also very constipated w TDF and oxy, and has been instructed to double oxy dose prn, will Rx lactulose to maintain laxations at q.d. [Supportive] : Goals of care discussed with patient: Supportive [Palliative Care Plan] : not applicable at this time

## 2018-12-31 ENCOUNTER — RX RENEWAL (OUTPATIENT)
Age: 63
End: 2018-12-31

## 2019-01-04 ENCOUNTER — RECORD ABSTRACTING (OUTPATIENT)
Age: 64
End: 2019-01-04

## 2019-01-04 DIAGNOSIS — Z79.4 LONG TERM (CURRENT) USE OF INSULIN: ICD-10-CM

## 2019-01-04 DIAGNOSIS — Z78.9 OTHER SPECIFIED HEALTH STATUS: ICD-10-CM

## 2019-01-07 ENCOUNTER — LABORATORY RESULT (OUTPATIENT)
Age: 64
End: 2019-01-07

## 2019-01-07 ENCOUNTER — RESULT REVIEW (OUTPATIENT)
Age: 64
End: 2019-01-07

## 2019-01-07 ENCOUNTER — APPOINTMENT (OUTPATIENT)
Age: 64
End: 2019-01-07

## 2019-01-07 ENCOUNTER — APPOINTMENT (OUTPATIENT)
Dept: HEMATOLOGY ONCOLOGY | Facility: CLINIC | Age: 64
End: 2019-01-07
Payer: MEDICARE

## 2019-01-07 VITALS
HEART RATE: 76 BPM | TEMPERATURE: 98.1 F | DIASTOLIC BLOOD PRESSURE: 64 MMHG | SYSTOLIC BLOOD PRESSURE: 114 MMHG | HEIGHT: 64 IN | WEIGHT: 217 LBS | BODY MASS INDEX: 37.05 KG/M2 | OXYGEN SATURATION: 97 %

## 2019-01-07 LAB
BASOPHILS # BLD AUTO: 0.1 K/UL — SIGNIFICANT CHANGE UP (ref 0–0.2)
BASOPHILS NFR BLD AUTO: 1 % — SIGNIFICANT CHANGE UP (ref 0–2)
EOSINOPHIL # BLD AUTO: 0.3 K/UL — SIGNIFICANT CHANGE UP (ref 0–0.5)
EOSINOPHIL NFR BLD AUTO: 2.1 % — SIGNIFICANT CHANGE UP (ref 0–6)
HCT VFR BLD CALC: 40.3 % — SIGNIFICANT CHANGE UP (ref 34.5–45)
HGB BLD-MCNC: 12.6 G/DL — SIGNIFICANT CHANGE UP (ref 11.5–15.5)
LYMPHOCYTES # BLD AUTO: 24.8 % — SIGNIFICANT CHANGE UP (ref 13–44)
LYMPHOCYTES # BLD AUTO: 3 K/UL — SIGNIFICANT CHANGE UP (ref 1–3.3)
MCHC RBC-ENTMCNC: 31.1 GM/DL — LOW (ref 32–36)
MCHC RBC-ENTMCNC: 31.2 PG — SIGNIFICANT CHANGE UP (ref 27–34)
MCV RBC AUTO: 100.3 FL — HIGH (ref 80–100)
MONOCYTES # BLD AUTO: 1.2 K/UL — HIGH (ref 0–0.9)
MONOCYTES NFR BLD AUTO: 10.1 % — SIGNIFICANT CHANGE UP (ref 2–14)
NEUTROPHILS # BLD AUTO: 7.5 K/UL — HIGH (ref 1.8–7.4)
NEUTROPHILS NFR BLD AUTO: 62 % — SIGNIFICANT CHANGE UP (ref 43–77)
PLATELET # BLD AUTO: 343 K/UL — SIGNIFICANT CHANGE UP (ref 150–400)
RBC # BLD: 4.02 M/UL — SIGNIFICANT CHANGE UP (ref 3.8–5.2)
RBC # FLD: 12.6 % — SIGNIFICANT CHANGE UP (ref 10.3–14.5)
WBC # BLD: 12.1 K/UL — HIGH (ref 3.8–10.5)
WBC # FLD AUTO: 12.1 K/UL — HIGH (ref 3.8–10.5)

## 2019-01-07 PROCEDURE — 99214 OFFICE O/P EST MOD 30 MIN: CPT

## 2019-01-08 DIAGNOSIS — Z51.11 ENCOUNTER FOR ANTINEOPLASTIC CHEMOTHERAPY: ICD-10-CM

## 2019-01-10 ENCOUNTER — APPOINTMENT (OUTPATIENT)
Dept: ENDOCRINOLOGY | Facility: CLINIC | Age: 64
End: 2019-01-10
Payer: MEDICARE

## 2019-01-10 VITALS
SYSTOLIC BLOOD PRESSURE: 130 MMHG | HEIGHT: 64 IN | BODY MASS INDEX: 36.54 KG/M2 | HEART RATE: 80 BPM | DIASTOLIC BLOOD PRESSURE: 70 MMHG | WEIGHT: 214 LBS

## 2019-01-10 DIAGNOSIS — E78.5 HYPERLIPIDEMIA, UNSPECIFIED: ICD-10-CM

## 2019-01-10 DIAGNOSIS — E55.9 VITAMIN D DEFICIENCY, UNSPECIFIED: ICD-10-CM

## 2019-01-10 DIAGNOSIS — I10 ESSENTIAL (PRIMARY) HYPERTENSION: ICD-10-CM

## 2019-01-10 DIAGNOSIS — E11.69 TYPE 2 DIABETES MELLITUS WITH OTHER SPECIFIED COMPLICATION: ICD-10-CM

## 2019-01-10 DIAGNOSIS — Z79.4 TYPE 2 DIABETES MELLITUS WITH OTHER SPECIFIED COMPLICATION: ICD-10-CM

## 2019-01-10 LAB — GLUCOSE BLDC GLUCOMTR-MCNC: 80

## 2019-01-10 PROCEDURE — 99214 OFFICE O/P EST MOD 30 MIN: CPT | Mod: 25

## 2019-01-10 PROCEDURE — 82962 GLUCOSE BLOOD TEST: CPT

## 2019-01-10 RX ORDER — DULAGLUTIDE 0.75 MG/.5ML
0.75 INJECTION, SOLUTION SUBCUTANEOUS
Qty: 4 | Refills: 5 | Status: ACTIVE | COMMUNITY
Start: 2019-01-10 | End: 1900-01-01

## 2019-01-10 RX ORDER — PEN NEEDLE, DIABETIC, SAFETY 30 GX3/16"
30G X 5 MM NEEDLE, DISPOSABLE MISCELLANEOUS
Qty: 1 | Refills: 3 | Status: ACTIVE | COMMUNITY
Start: 1900-01-01 | End: 1900-01-01

## 2019-01-10 RX ORDER — DULAGLUTIDE 1.5 MG/.5ML
1.5 INJECTION, SOLUTION SUBCUTANEOUS
Refills: 0 | Status: DISCONTINUED | COMMUNITY
End: 2019-01-10

## 2019-01-10 RX ORDER — AMOXICILLIN 500 MG/1
500 TABLET, FILM COATED ORAL
Qty: 4 | Refills: 5 | Status: DISCONTINUED | COMMUNITY
Start: 2018-10-30 | End: 2019-01-10

## 2019-01-10 NOTE — ASSESSMENT
[FreeTextEntry1] : 63 year old female with hx of type 2 DM with associated nephropathy and neuropathy, HTN, hyperlipidemia, CHF due to valvular heart disease in the past and vitamin D deficiency.  She is currently undergoing treatment for metastatic RCC.  \par \par 1.  Type 2 DM-  due to low A1c and associated nausea, will lower dose of Trulicity to 0.75 mg qweek.  Repeat A1c in 4 months\par 2.  Hyperlipidemia-  continue statin\par 3.  HTN-  continue Lisinopril\par 4.  Vitamin D def-  continue vitamin D3 5000 IU daily.

## 2019-01-10 NOTE — REVIEW OF SYSTEMS
[Fatigue] : fatigue [Nausea] : nausea [Abdominal Pain] : abdominal pain [Chest Pain] : no chest pain [Shortness Of Breath] : no shortness of breath [Pain/Numbness of Digits] : no pain/numbness of digits [Polydipsia] : no polydipsia

## 2019-01-15 ENCOUNTER — OUTPATIENT (OUTPATIENT)
Dept: OUTPATIENT SERVICES | Facility: HOSPITAL | Age: 64
LOS: 1 days | Discharge: HOME | End: 2019-01-15

## 2019-01-15 DIAGNOSIS — D64.9 ANEMIA, UNSPECIFIED: ICD-10-CM

## 2019-01-15 DIAGNOSIS — Z95.2 PRESENCE OF PROSTHETIC HEART VALVE: Chronic | ICD-10-CM

## 2019-01-15 DIAGNOSIS — C79.51 SECONDARY MALIGNANT NEOPLASM OF BONE: ICD-10-CM

## 2019-01-15 DIAGNOSIS — Z98.890 OTHER SPECIFIED POSTPROCEDURAL STATES: Chronic | ICD-10-CM

## 2019-01-18 NOTE — PHYSICAL EXAM
[Restricted in physically strenuous activity but ambulatory and able to carry out work of a light or sedentary nature] : Status 1- Restricted in physically strenuous activity but ambulatory and able to carry out work of a light or sedentary nature, e.g., light house work, office work [Normal] : affect appropriate [de-identified] : B/L plantar feet with few crescenteric blisters, L>R.  Erythema now resolved.  Xeroderma B/L hands

## 2019-01-18 NOTE — HISTORY OF PRESENT ILLNESS
[de-identified] : The patient was diagnosed with recurrent renal cell carcinoma in March 2018 at the age of 62.  She was initially diagnosed with B/L clear cell renal cell carcinoma in March 2017 and has been followed by Urology, Dr. Rodney Rodriguez.  She underwent percutaneous cryoablation (considered a high surgical risk due to her multiple comorbidities) of a right renal mass in March 2017 and a left renal mass in May 2017 by IR, Dr. Waqar Landa.  Pathology was suspicious for clear cell carcinoma.  Her first follow up CT A/P on 8/30/17 stated interval successful cryoablation of bilateral solid renal masses without evidence of residual or recurrent disease.  Follow up MRI on 3/23/18 showed a posterior left interpolar lesion measures 3.1 x 3 cm, previously measuring 3.8 x 3.4 cm on 8/30/2017, and demonstrates an enhancing mural nodule, suspicious for residual/recurrent disease. New 1.7 x 1.2 cm enhancing lesion in the left posterior pararenal space abutting the transversalis fascia, consistent with residual disease. She was initially being considered for repeat cryoablation of the left kidney lesion, but in recent months she started using a treadmill in an attempt to improve her health and lose weight when she thought she pulled a muscle in the right groin. The pain progressively worsened and she presented to [facility] ED. A CT scan 4/27/18 showed a new 4 cm lytic lesion in the inferior right pubic ramus with small soft tissue component suspicious for solitary metastatic disease. It also showed small bilateral pulmonary nodules which are nonspecific but may potentially represent metastatic disease.  She was evaluated by Dr Barbosa on 5/3/18 for palliative RT of this pubic ramus lesion.  She presents today for consideration of systemic treatment.  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  Initially started on 5/27/18.  40mg treatment held between 6/20/18 - 7/22/18, reduced to 20mg starting 7/23/18. [de-identified] : Patient presents for C3D1 Nivolumab.  She is s/p pazopanib and s/p cabozantinib both discontinued due to H/F syndrome.  Hand/foot syndrome resolved in hands, feet nearly resolved. + Pain in right groin s/t metastatic disease, was improved s/p palliative RT, but again severe with continued severe pain in the right buttock. + Hair thinning, unchanged. + Fatigue, worse recently. + Constipation, improved with lactulose.  +Nausea, constant but no emesis.  Appetite poor, has lost ~20lbs since the fall.  No rash, no pruritus. \par \par 11/3/18 CT C/A/P: Bilateral pulmonary nodules, some of which have slightly increased in size 8/16/18. Stable left renal and perinephric masses. Interval worsening osseous metastasis involving the right pubic ramus.

## 2019-01-24 ENCOUNTER — APPOINTMENT (OUTPATIENT)
Dept: HEMATOLOGY ONCOLOGY | Facility: CLINIC | Age: 64
End: 2019-01-24

## 2019-01-30 ENCOUNTER — APPOINTMENT (OUTPATIENT)
Dept: HEMATOLOGY ONCOLOGY | Facility: CLINIC | Age: 64
End: 2019-01-30

## 2019-01-31 RX ORDER — OXYCODONE 10 MG/1
10 TABLET ORAL EVERY 6 HOURS
Qty: 120 | Refills: 0 | Status: ACTIVE | COMMUNITY
Start: 2018-09-28 | End: 1900-01-01

## 2019-01-31 RX ORDER — FENTANYL 50 UG/H
50 PATCH, EXTENDED RELEASE TRANSDERMAL
Qty: 10 | Refills: 0 | Status: ACTIVE | COMMUNITY
Start: 2018-10-22 | End: 1900-01-01

## 2019-02-04 ENCOUNTER — APPOINTMENT (OUTPATIENT)
Age: 64
End: 2019-02-04

## 2019-02-04 ENCOUNTER — LABORATORY RESULT (OUTPATIENT)
Age: 64
End: 2019-02-04

## 2019-02-04 ENCOUNTER — FORM ENCOUNTER (OUTPATIENT)
Age: 64
End: 2019-02-04

## 2019-02-05 ENCOUNTER — OUTPATIENT (OUTPATIENT)
Dept: OUTPATIENT SERVICES | Facility: HOSPITAL | Age: 64
LOS: 1 days | End: 2019-02-05
Payer: MEDICARE

## 2019-02-05 ENCOUNTER — APPOINTMENT (OUTPATIENT)
Dept: CT IMAGING | Facility: CLINIC | Age: 64
End: 2019-02-05
Payer: MEDICARE

## 2019-02-05 DIAGNOSIS — C64.9 MALIGNANT NEOPLASM OF UNSPECIFIED KIDNEY, EXCEPT RENAL PELVIS: ICD-10-CM

## 2019-02-05 DIAGNOSIS — Z98.890 OTHER SPECIFIED POSTPROCEDURAL STATES: Chronic | ICD-10-CM

## 2019-02-05 DIAGNOSIS — R11.2 NAUSEA WITH VOMITING, UNSPECIFIED: ICD-10-CM

## 2019-02-05 DIAGNOSIS — Z95.2 PRESENCE OF PROSTHETIC HEART VALVE: Chronic | ICD-10-CM

## 2019-02-05 DIAGNOSIS — Z51.11 ENCOUNTER FOR ANTINEOPLASTIC CHEMOTHERAPY: ICD-10-CM

## 2019-02-05 PROCEDURE — 71260 CT THORAX DX C+: CPT | Mod: 26

## 2019-02-05 PROCEDURE — 74177 CT ABD & PELVIS W/CONTRAST: CPT

## 2019-02-05 PROCEDURE — 74177 CT ABD & PELVIS W/CONTRAST: CPT | Mod: 26

## 2019-02-05 PROCEDURE — 71260 CT THORAX DX C+: CPT

## 2019-02-19 ENCOUNTER — OUTPATIENT (OUTPATIENT)
Dept: OUTPATIENT SERVICES | Facility: HOSPITAL | Age: 64
LOS: 1 days | Discharge: HOME | End: 2019-02-19

## 2019-02-19 DIAGNOSIS — Z95.2 PRESENCE OF PROSTHETIC HEART VALVE: Chronic | ICD-10-CM

## 2019-02-19 DIAGNOSIS — C64.9 MALIGNANT NEOPLASM OF UNSPECIFIED KIDNEY, EXCEPT RENAL PELVIS: ICD-10-CM

## 2019-02-19 DIAGNOSIS — Z98.890 OTHER SPECIFIED POSTPROCEDURAL STATES: Chronic | ICD-10-CM

## 2019-02-19 DIAGNOSIS — C79.51 SECONDARY MALIGNANT NEOPLASM OF BONE: ICD-10-CM

## 2019-02-25 ENCOUNTER — MEDICATION RENEWAL (OUTPATIENT)
Age: 64
End: 2019-02-25

## 2019-02-25 RX ORDER — METOPROLOL SUCCINATE 50 MG/1
50 TABLET, EXTENDED RELEASE ORAL
Qty: 90 | Refills: 1 | Status: ACTIVE | COMMUNITY
Start: 2018-04-30 | End: 1900-01-01

## 2019-02-27 ENCOUNTER — RESULT REVIEW (OUTPATIENT)
Age: 64
End: 2019-02-27

## 2019-02-27 ENCOUNTER — APPOINTMENT (OUTPATIENT)
Dept: HEMATOLOGY ONCOLOGY | Facility: CLINIC | Age: 64
End: 2019-02-27
Payer: MEDICARE

## 2019-02-27 VITALS
WEIGHT: 219.04 LBS | TEMPERATURE: 98.6 F | BODY MASS INDEX: 37.39 KG/M2 | HEART RATE: 104 BPM | SYSTOLIC BLOOD PRESSURE: 97 MMHG | HEIGHT: 64 IN | DIASTOLIC BLOOD PRESSURE: 63 MMHG | OXYGEN SATURATION: 92 %

## 2019-02-27 LAB
BASOPHILS # BLD AUTO: 0.1 K/UL — SIGNIFICANT CHANGE UP (ref 0–0.2)
BASOPHILS NFR BLD AUTO: 0.8 % — SIGNIFICANT CHANGE UP (ref 0–2)
EOSINOPHIL # BLD AUTO: 0.3 K/UL — SIGNIFICANT CHANGE UP (ref 0–0.5)
EOSINOPHIL NFR BLD AUTO: 2.6 % — SIGNIFICANT CHANGE UP (ref 0–6)
HCT VFR BLD CALC: 38.3 % — SIGNIFICANT CHANGE UP (ref 34.5–45)
HGB BLD-MCNC: 11.9 G/DL — SIGNIFICANT CHANGE UP (ref 11.5–15.5)
LYMPHOCYTES # BLD AUTO: 26.3 % — SIGNIFICANT CHANGE UP (ref 13–44)
LYMPHOCYTES # BLD AUTO: 3.3 K/UL — SIGNIFICANT CHANGE UP (ref 1–3.3)
MCHC RBC-ENTMCNC: 30.6 PG — SIGNIFICANT CHANGE UP (ref 27–34)
MCHC RBC-ENTMCNC: 31 G/DL — LOW (ref 32–36)
MCV RBC AUTO: 98.7 FL — SIGNIFICANT CHANGE UP (ref 80–100)
MONOCYTES # BLD AUTO: 1.2 K/UL — HIGH (ref 0–0.9)
MONOCYTES NFR BLD AUTO: 9.7 % — SIGNIFICANT CHANGE UP (ref 2–14)
NEUTROPHILS # BLD AUTO: 7.5 K/UL — HIGH (ref 1.8–7.4)
NEUTROPHILS NFR BLD AUTO: 60.6 % — SIGNIFICANT CHANGE UP (ref 43–77)
PLATELET # BLD AUTO: 322 K/UL — SIGNIFICANT CHANGE UP (ref 150–400)
RBC # BLD: 3.88 M/UL — SIGNIFICANT CHANGE UP (ref 3.8–5.2)
RBC # FLD: 13.2 % — SIGNIFICANT CHANGE UP (ref 10.3–14.5)
WBC # BLD: 12.4 K/UL — HIGH (ref 3.8–10.5)
WBC # FLD AUTO: 12.4 K/UL — HIGH (ref 3.8–10.5)

## 2019-02-27 PROCEDURE — 99215 OFFICE O/P EST HI 40 MIN: CPT

## 2019-02-28 LAB
ALBUMIN SERPL ELPH-MCNC: 3.7 G/DL
ALP BLD-CCNC: 75 U/L
ALT SERPL-CCNC: 15 U/L
ANION GAP SERPL CALC-SCNC: 12 MMOL/L
AST SERPL-CCNC: 19 U/L
BILIRUB SERPL-MCNC: 0.2 MG/DL
BUN SERPL-MCNC: 21 MG/DL
CALCIUM SERPL-MCNC: 9.4 MG/DL
CHLORIDE SERPL-SCNC: 99 MMOL/L
CO2 SERPL-SCNC: 29 MMOL/L
CREAT SERPL-MCNC: 1.39 MG/DL
GLUCOSE SERPL-MCNC: 102 MG/DL
MAGNESIUM SERPL-MCNC: 2 MG/DL
POTASSIUM SERPL-SCNC: 5 MMOL/L
PROT SERPL-MCNC: 6.8 G/DL
SODIUM SERPL-SCNC: 140 MMOL/L

## 2019-03-02 ENCOUNTER — TRANSCRIPTION ENCOUNTER (OUTPATIENT)
Age: 64
End: 2019-03-02

## 2019-03-05 ENCOUNTER — LABORATORY RESULT (OUTPATIENT)
Age: 64
End: 2019-03-05

## 2019-03-05 ENCOUNTER — APPOINTMENT (OUTPATIENT)
Age: 64
End: 2019-03-05

## 2019-03-06 DIAGNOSIS — R11.2 NAUSEA WITH VOMITING, UNSPECIFIED: ICD-10-CM

## 2019-03-06 DIAGNOSIS — Z51.11 ENCOUNTER FOR ANTINEOPLASTIC CHEMOTHERAPY: ICD-10-CM

## 2019-03-08 ENCOUNTER — EMERGENCY (EMERGENCY)
Facility: HOSPITAL | Age: 64
LOS: 1 days | Discharge: HOME | End: 2019-03-08
Attending: EMERGENCY MEDICINE
Payer: MEDICARE

## 2019-03-08 VITALS
WEIGHT: 216.05 LBS | OXYGEN SATURATION: 98 % | HEIGHT: 64 IN | DIASTOLIC BLOOD PRESSURE: 65 MMHG | HEART RATE: 77 BPM | SYSTOLIC BLOOD PRESSURE: 125 MMHG | TEMPERATURE: 98 F | RESPIRATION RATE: 20 BRPM

## 2019-03-08 VITALS
TEMPERATURE: 98 F | RESPIRATION RATE: 18 BRPM | HEART RATE: 92 BPM | DIASTOLIC BLOOD PRESSURE: 72 MMHG | OXYGEN SATURATION: 96 % | SYSTOLIC BLOOD PRESSURE: 119 MMHG

## 2019-03-08 DIAGNOSIS — Z95.2 PRESENCE OF PROSTHETIC HEART VALVE: Chronic | ICD-10-CM

## 2019-03-08 DIAGNOSIS — Z98.890 OTHER SPECIFIED POSTPROCEDURAL STATES: Chronic | ICD-10-CM

## 2019-03-08 LAB
ALBUMIN SERPL ELPH-MCNC: 4.1 G/DL — SIGNIFICANT CHANGE UP (ref 3.3–5.2)
ALP SERPL-CCNC: 82 U/L — SIGNIFICANT CHANGE UP (ref 40–120)
ALT FLD-CCNC: 14 U/L — SIGNIFICANT CHANGE UP
ANION GAP SERPL CALC-SCNC: 12 MMOL/L — SIGNIFICANT CHANGE UP (ref 5–17)
APTT BLD: 31.4 SEC — SIGNIFICANT CHANGE UP (ref 27.5–36.3)
AST SERPL-CCNC: 16 U/L — SIGNIFICANT CHANGE UP
BASOPHILS # BLD AUTO: 0.1 K/UL — SIGNIFICANT CHANGE UP (ref 0–0.2)
BASOPHILS NFR BLD AUTO: 0.4 % — SIGNIFICANT CHANGE UP (ref 0–2)
BILIRUB SERPL-MCNC: 0.3 MG/DL — LOW (ref 0.4–2)
BUN SERPL-MCNC: 25 MG/DL — HIGH (ref 8–20)
CALCIUM SERPL-MCNC: 9.9 MG/DL — SIGNIFICANT CHANGE UP (ref 8.6–10.2)
CHLORIDE SERPL-SCNC: 101 MMOL/L — SIGNIFICANT CHANGE UP (ref 98–107)
CO2 SERPL-SCNC: 25 MMOL/L — SIGNIFICANT CHANGE UP (ref 22–29)
CREAT SERPL-MCNC: 1 MG/DL — SIGNIFICANT CHANGE UP (ref 0.5–1.3)
EOSINOPHIL # BLD AUTO: 0.2 K/UL — SIGNIFICANT CHANGE UP (ref 0–0.5)
EOSINOPHIL NFR BLD AUTO: 1.1 % — SIGNIFICANT CHANGE UP (ref 0–6)
GLUCOSE SERPL-MCNC: 98 MG/DL — SIGNIFICANT CHANGE UP (ref 70–115)
HCT VFR BLD CALC: 39.2 % — SIGNIFICANT CHANGE UP (ref 37–47)
HGB BLD-MCNC: 12.5 G/DL — SIGNIFICANT CHANGE UP (ref 12–16)
INR BLD: 1.13 RATIO — SIGNIFICANT CHANGE UP (ref 0.88–1.16)
LYMPHOCYTES # BLD AUTO: 16.7 % — LOW (ref 20–55)
LYMPHOCYTES # BLD AUTO: 2.4 K/UL — SIGNIFICANT CHANGE UP (ref 1–4.8)
MAGNESIUM SERPL-MCNC: 2.3 MG/DL — SIGNIFICANT CHANGE UP (ref 1.6–2.6)
MCHC RBC-ENTMCNC: 31 PG — SIGNIFICANT CHANGE UP (ref 27–31)
MCHC RBC-ENTMCNC: 31.9 G/DL — LOW (ref 32–36)
MCV RBC AUTO: 97.3 FL — SIGNIFICANT CHANGE UP (ref 81–99)
MONOCYTES # BLD AUTO: 1.6 K/UL — HIGH (ref 0–0.8)
MONOCYTES NFR BLD AUTO: 11.3 % — HIGH (ref 3–10)
NEUTROPHILS # BLD AUTO: 10.2 K/UL — HIGH (ref 1.8–8)
NEUTROPHILS NFR BLD AUTO: 70 % — SIGNIFICANT CHANGE UP (ref 37–73)
PLATELET # BLD AUTO: 377 K/UL — SIGNIFICANT CHANGE UP (ref 150–400)
POTASSIUM SERPL-MCNC: 4.6 MMOL/L — SIGNIFICANT CHANGE UP (ref 3.5–5.3)
POTASSIUM SERPL-SCNC: 4.6 MMOL/L — SIGNIFICANT CHANGE UP (ref 3.5–5.3)
PROT SERPL-MCNC: 8 G/DL — SIGNIFICANT CHANGE UP (ref 6.6–8.7)
PROTHROM AB SERPL-ACNC: 13.1 SEC — HIGH (ref 10–12.9)
RBC # BLD: 4.03 M/UL — LOW (ref 4.4–5.2)
RBC # FLD: 14.8 % — SIGNIFICANT CHANGE UP (ref 11–15.6)
SODIUM SERPL-SCNC: 138 MMOL/L — SIGNIFICANT CHANGE UP (ref 135–145)
WBC # BLD: 14.6 K/UL — HIGH (ref 4.8–10.8)
WBC # FLD AUTO: 14.6 K/UL — HIGH (ref 4.8–10.8)

## 2019-03-08 PROCEDURE — 85730 THROMBOPLASTIN TIME PARTIAL: CPT

## 2019-03-08 PROCEDURE — 36415 COLL VENOUS BLD VENIPUNCTURE: CPT

## 2019-03-08 PROCEDURE — 96375 TX/PRO/DX INJ NEW DRUG ADDON: CPT

## 2019-03-08 PROCEDURE — 70450 CT HEAD/BRAIN W/O DYE: CPT

## 2019-03-08 PROCEDURE — 85610 PROTHROMBIN TIME: CPT

## 2019-03-08 PROCEDURE — 99285 EMERGENCY DEPT VISIT HI MDM: CPT

## 2019-03-08 PROCEDURE — 72125 CT NECK SPINE W/O DYE: CPT | Mod: 26

## 2019-03-08 PROCEDURE — 70450 CT HEAD/BRAIN W/O DYE: CPT | Mod: 26

## 2019-03-08 PROCEDURE — 80053 COMPREHEN METABOLIC PANEL: CPT

## 2019-03-08 PROCEDURE — 99284 EMERGENCY DEPT VISIT MOD MDM: CPT | Mod: 25

## 2019-03-08 PROCEDURE — 96365 THER/PROPH/DIAG IV INF INIT: CPT

## 2019-03-08 PROCEDURE — 83735 ASSAY OF MAGNESIUM: CPT

## 2019-03-08 PROCEDURE — 72125 CT NECK SPINE W/O DYE: CPT

## 2019-03-08 PROCEDURE — 85027 COMPLETE CBC AUTOMATED: CPT

## 2019-03-08 RX ORDER — DESLORATADINE 5 MG/1
1 TABLET, FILM COATED ORAL
Qty: 0 | Refills: 0 | COMMUNITY

## 2019-03-08 RX ORDER — ASPIRIN/CALCIUM CARB/MAGNESIUM 324 MG
1 TABLET ORAL
Qty: 0 | Refills: 0 | COMMUNITY

## 2019-03-08 RX ORDER — KETOROLAC TROMETHAMINE 30 MG/ML
30 SYRINGE (ML) INJECTION ONCE
Qty: 0 | Refills: 0 | Status: DISCONTINUED | OUTPATIENT
Start: 2019-03-08 | End: 2019-03-08

## 2019-03-08 RX ORDER — METOCLOPRAMIDE HCL 10 MG
10 TABLET ORAL ONCE
Qty: 0 | Refills: 0 | Status: COMPLETED | OUTPATIENT
Start: 2019-03-08 | End: 2019-03-08

## 2019-03-08 RX ORDER — GLIMEPIRIDE 1 MG
1 TABLET ORAL
Qty: 0 | Refills: 0 | COMMUNITY

## 2019-03-08 RX ORDER — DEXAMETHASONE 0.5 MG/5ML
6 ELIXIR ORAL ONCE
Qty: 0 | Refills: 0 | Status: COMPLETED | OUTPATIENT
Start: 2019-03-08 | End: 2019-03-08

## 2019-03-08 RX ORDER — METOCLOPRAMIDE HCL 10 MG
1 TABLET ORAL
Qty: 21 | Refills: 0 | OUTPATIENT
Start: 2019-03-08 | End: 2019-03-14

## 2019-03-08 RX ORDER — MAGNESIUM SULFATE 500 MG/ML
1 VIAL (ML) INJECTION ONCE
Qty: 0 | Refills: 0 | Status: COMPLETED | OUTPATIENT
Start: 2019-03-08 | End: 2019-03-08

## 2019-03-08 RX ORDER — GABAPENTIN 400 MG/1
2 CAPSULE ORAL
Qty: 0 | Refills: 0 | COMMUNITY

## 2019-03-08 RX ORDER — BUPROPION HYDROCHLORIDE 150 MG/1
0 TABLET, EXTENDED RELEASE ORAL
Qty: 0 | Refills: 0 | COMMUNITY

## 2019-03-08 RX ORDER — FUROSEMIDE 40 MG
1 TABLET ORAL
Qty: 0 | Refills: 0 | COMMUNITY

## 2019-03-08 RX ADMIN — Medication 50 GRAM(S): at 12:25

## 2019-03-08 RX ADMIN — Medication 30 MILLIGRAM(S): at 14:15

## 2019-03-08 RX ADMIN — Medication 6 MILLIGRAM(S): at 14:15

## 2019-03-08 RX ADMIN — Medication 2 TABLET(S): at 11:42

## 2019-03-08 RX ADMIN — Medication 1 GRAM(S): at 13:25

## 2019-03-08 RX ADMIN — Medication 2 TABLET(S): at 12:25

## 2019-03-08 RX ADMIN — Medication 10 MILLIGRAM(S): at 11:42

## 2019-03-08 RX ADMIN — Medication 30 MILLIGRAM(S): at 14:57

## 2019-03-08 NOTE — ED ADULT NURSE NOTE - OBJECTIVE STATEMENT
pt reports headache for 1 week, hot and cold sweat. reports dizziness all the time due to cancer. kidney cancer, secondary bone, liver and lung.
no

## 2019-03-08 NOTE — ED ADULT NURSE NOTE - NSIMPLEMENTINTERV_GEN_ALL_ED
Implemented All Universal Safety Interventions:  [place] to call system. Call bell, personal items and telephone within reach. Instruct patient to call for assistance. Room bathroom lighting operational. Non-slip footwear when patient is off stretcher. Physically safe environment: no spills, clutter or unnecessary equipment. Stretcher in lowest position, wheels locked, appropriate side rails in place.

## 2019-03-08 NOTE — ED STATDOCS - ATTENDING CONTRIBUTION TO CARE
I, Devon Rodriguez, performed the initial face to face bedside interview with this patient regarding history of present illness, review of symptoms and relevant past medical, social and family history.  I completed an independent physical examination.  I was the initial provider who evaluated this patient. I have signed out the follow up of any pending tests (i.e. labs, radiological studies) to the ACP.  I have communicated the patient’s plan of care and disposition with the ACP.

## 2019-03-08 NOTE — ED STATDOCS - OBJECTIVE STATEMENT
64 y/o F pt with hx of kidney cancer secondary bone/liver/lung cancer presents to ED with daughter c/o HA, with assoc. chills and diaphoresis for 1 week. Per daughter reports pt will become SOB when she is diaphoretic. Pt report's her HA starts out towards the back side of her head, near her neck and then moves towards the front. Pt's HA worsens with "everything"; states this HA feels different from her migraine-like HA's she has felt in the past. Pt's last chemotherapy session was on Thursday. Pt is currently on Fentanyl, and Oxycodone; states her Oxycodone won't give her any relief from her HA either. Denies fever, abdominal pain, numbness, dizziness, and tingling. No further complaints at this time. 62 y/o F pt with hx of kidney cancer secondary bone/liver/lung cancer presents to ED with daughter c/o HA, with assoc. chills and diaphoresis for 1 week. . Pt report's her HA starts out towards the back side of her head, near her neck and then moves towards the front. Pt's HA worsens with "everything"; states this HA feels different from her migraine-like HA's she has felt in the past. Pt's last chemotherapy session was on Thursday. Pt is currently on Fentanyl, and Oxycodone; states her Oxycodone won't give her any relief from her HA either. Denies fever, abdominal pain, numbness, dizziness, and tingling. No further complaints at this time.

## 2019-03-08 NOTE — ED STATDOCS - PROGRESS NOTE DETAILS
PA note: PT evaluated by intake physician. HPI/PE/ROS as noted above. Will follow up plan per intake physician. Pt complaining of headache, worse on right side starting and back of her head and radiating to front. Pt has hx of migraines years ago but states this feels different. No blurred vision, dizziness, syncope. Pt states headache has been constant and gradually worsening for 1 week. Neuro: A&O x 3 to person, place and time. CN II-XII intact, no focal deficits. Full sensation, no weakness. Pt ambulatory in ED. Patient reports improvement in symptoms at this time, ambulatory in ED, no current complaints. Patient stable for discharge at this time. I had a lengthy discussion with patient regarding all results, plan of care, proper medication use and side effects if indicated, and return precautions for persistent/worsening symptoms. Patient instructed to follow up with their PMD/oncologist within 1 week for further eval.  I advised patient to seek immediate medical attention for any new/worsening symptoms or concerns.  Patient provided with ample opportunity to ask questions, all of which were answered to the fullest extent.  Patient given printed copies of lab/radiology results to aid in proper outpatient follow up. Patient verbalized understanding and agreement of plan of care.

## 2019-03-08 NOTE — ED ADULT TRIAGE NOTE - CHIEF COMPLAINT QUOTE
c/o headache x 1 week, feels nausea, hot and cold flashes, took oxycodone that she has in her house negative relief

## 2019-03-08 NOTE — ED STATDOCS - CLINICAL SUMMARY MEDICAL DECISION MAKING FREE TEXT BOX
Migraine vs. intracranial hemorrhage, will medicate and obtain CT imaging and re-evaluate. CTH neg for intracranial pathology. Pt states she feels better, is neuro intact, and tolerating PO. CT with new spots on spine, informed pt. The pain she is radiate sup from the neck and maybe be secondary to these likely mets,. Neck supple, no fever, lungs clear. Pt has onc f/u. Will sonali shultz and reglan for her HA and dc

## 2019-03-12 RX ORDER — FENTANYL 75 UG/H
75 PATCH, EXTENDED RELEASE TRANSDERMAL
Qty: 10 | Refills: 0 | Status: ACTIVE | COMMUNITY
Start: 2019-03-12 | End: 1900-01-01

## 2019-03-14 RX ORDER — OXYCODONE 10 MG/1
10 TABLET ORAL
Qty: 180 | Refills: 0 | Status: ACTIVE | COMMUNITY
Start: 2019-02-27 | End: 1900-01-01

## 2019-03-15 ENCOUNTER — FORM ENCOUNTER (OUTPATIENT)
Age: 64
End: 2019-03-15

## 2019-03-15 RX ORDER — METHOCARBAMOL 500 MG/1
500 TABLET, FILM COATED ORAL 3 TIMES DAILY
Qty: 45 | Refills: 0 | Status: DISCONTINUED | COMMUNITY
Start: 2019-03-14 | End: 2019-03-15

## 2019-03-16 ENCOUNTER — APPOINTMENT (OUTPATIENT)
Dept: MRI IMAGING | Facility: CLINIC | Age: 64
End: 2019-03-16
Payer: MEDICARE

## 2019-03-16 ENCOUNTER — OUTPATIENT (OUTPATIENT)
Dept: OUTPATIENT SERVICES | Facility: HOSPITAL | Age: 64
LOS: 1 days | End: 2019-03-16
Payer: MEDICARE

## 2019-03-16 DIAGNOSIS — Z98.890 OTHER SPECIFIED POSTPROCEDURAL STATES: Chronic | ICD-10-CM

## 2019-03-16 DIAGNOSIS — C79.51 SECONDARY MALIGNANT NEOPLASM OF BONE: ICD-10-CM

## 2019-03-16 DIAGNOSIS — Z95.2 PRESENCE OF PROSTHETIC HEART VALVE: Chronic | ICD-10-CM

## 2019-03-16 PROCEDURE — A9585: CPT

## 2019-03-16 PROCEDURE — 72158 MRI LUMBAR SPINE W/O & W/DYE: CPT

## 2019-03-16 PROCEDURE — 72158 MRI LUMBAR SPINE W/O & W/DYE: CPT | Mod: 26

## 2019-03-17 ENCOUNTER — FORM ENCOUNTER (OUTPATIENT)
Age: 64
End: 2019-03-17

## 2019-03-18 ENCOUNTER — APPOINTMENT (OUTPATIENT)
Dept: MRI IMAGING | Facility: CLINIC | Age: 64
End: 2019-03-18

## 2019-03-18 ENCOUNTER — OUTPATIENT (OUTPATIENT)
Dept: OUTPATIENT SERVICES | Facility: HOSPITAL | Age: 64
LOS: 1 days | End: 2019-03-18
Payer: MEDICARE

## 2019-03-18 DIAGNOSIS — C79.51 SECONDARY MALIGNANT NEOPLASM OF BONE: ICD-10-CM

## 2019-03-18 DIAGNOSIS — Z95.2 PRESENCE OF PROSTHETIC HEART VALVE: Chronic | ICD-10-CM

## 2019-03-18 DIAGNOSIS — Z98.890 OTHER SPECIFIED POSTPROCEDURAL STATES: Chronic | ICD-10-CM

## 2019-03-18 PROCEDURE — 72156 MRI NECK SPINE W/O & W/DYE: CPT | Mod: 26

## 2019-03-18 PROCEDURE — 72157 MRI CHEST SPINE W/O & W/DYE: CPT | Mod: 26

## 2019-03-18 NOTE — REVIEW OF SYSTEMS
[Chills] : chills [Night Sweats] : night sweats [Fatigue] : fatigue [Recent Change In Weight] : ~T recent weight change [SOB on Exertion] : shortness of breath during exertion [Hoarseness] : hoarseness [Constipation] : constipation [Insomnia] : insomnia [Anxiety] : anxiety [Depression] : depression [Hot Flashes] : hot flashes [Negative] : Allergic/Immunologic

## 2019-03-19 ENCOUNTER — RESULT REVIEW (OUTPATIENT)
Age: 64
End: 2019-03-19

## 2019-03-19 ENCOUNTER — APPOINTMENT (OUTPATIENT)
Age: 64
End: 2019-03-19
Payer: MEDICARE

## 2019-03-19 ENCOUNTER — LABORATORY RESULT (OUTPATIENT)
Age: 64
End: 2019-03-19

## 2019-03-19 VITALS
BODY MASS INDEX: 36.88 KG/M2 | HEIGHT: 64 IN | DIASTOLIC BLOOD PRESSURE: 66 MMHG | OXYGEN SATURATION: 93 % | HEART RATE: 92 BPM | SYSTOLIC BLOOD PRESSURE: 102 MMHG | WEIGHT: 216.03 LBS

## 2019-03-19 LAB
BASOPHILS # BLD AUTO: 0.1 K/UL — SIGNIFICANT CHANGE UP (ref 0–0.2)
BASOPHILS NFR BLD AUTO: 0.5 % — SIGNIFICANT CHANGE UP (ref 0–2)
EOSINOPHIL # BLD AUTO: 0.2 K/UL — SIGNIFICANT CHANGE UP (ref 0–0.5)
EOSINOPHIL NFR BLD AUTO: 1.5 % — SIGNIFICANT CHANGE UP (ref 0–6)
HCT VFR BLD CALC: 38.2 % — SIGNIFICANT CHANGE UP (ref 34.5–45)
HGB BLD-MCNC: 11.7 G/DL — SIGNIFICANT CHANGE UP (ref 11.5–15.5)
LYMPHOCYTES # BLD AUTO: 2.9 K/UL — SIGNIFICANT CHANGE UP (ref 1–3.3)
LYMPHOCYTES # BLD AUTO: 22.9 % — SIGNIFICANT CHANGE UP (ref 13–44)
MCHC RBC-ENTMCNC: 30.1 PG — SIGNIFICANT CHANGE UP (ref 27–34)
MCHC RBC-ENTMCNC: 30.6 G/DL — LOW (ref 32–36)
MCV RBC AUTO: 98.3 FL — SIGNIFICANT CHANGE UP (ref 80–100)
MONOCYTES # BLD AUTO: 1.4 K/UL — HIGH (ref 0–0.9)
MONOCYTES NFR BLD AUTO: 10.7 % — SIGNIFICANT CHANGE UP (ref 2–14)
NEUTROPHILS # BLD AUTO: 8.2 K/UL — HIGH (ref 1.8–7.4)
NEUTROPHILS NFR BLD AUTO: 64.5 % — SIGNIFICANT CHANGE UP (ref 43–77)
PLATELET # BLD AUTO: 305 K/UL — SIGNIFICANT CHANGE UP (ref 150–400)
RBC # BLD: 3.88 M/UL — SIGNIFICANT CHANGE UP (ref 3.8–5.2)
RBC # FLD: 13.8 % — SIGNIFICANT CHANGE UP (ref 10.3–14.5)
WBC # BLD: 12.8 K/UL — HIGH (ref 3.8–10.5)
WBC # FLD AUTO: 12.8 K/UL — HIGH (ref 3.8–10.5)

## 2019-03-19 PROCEDURE — 99215 OFFICE O/P EST HI 40 MIN: CPT

## 2019-03-19 RX ORDER — LORAZEPAM 0.5 MG/1
0.5 TABLET ORAL
Qty: 4 | Refills: 0 | Status: ACTIVE | COMMUNITY
Start: 2019-03-14 | End: 1900-01-01

## 2019-03-19 NOTE — HISTORY OF PRESENT ILLNESS
[de-identified] : The patient was diagnosed with recurrent renal cell carcinoma in March 2018 at the age of 62.  She was initially diagnosed with B/L clear cell renal cell carcinoma in March 2017 and has been followed by Urology, Dr. Rodney Rodriguez.  She underwent percutaneous cryoablation (considered a high surgical risk due to her multiple comorbidities) of a right renal mass in March 2017 and a left renal mass in May 2017 by IR, Dr. Waqar Landa.  Pathology was suspicious for clear cell carcinoma.  Her first follow up CT A/P on 8/30/17 stated interval successful cryoablation of bilateral solid renal masses without evidence of residual or recurrent disease.  Follow up MRI on 3/23/18 showed a posterior left interpolar lesion measures 3.1 x 3 cm, previously measuring 3.8 x 3.4 cm on 8/30/2017, and demonstrates an enhancing mural nodule, suspicious for residual/recurrent disease. New 1.7 x 1.2 cm enhancing lesion in the left posterior pararenal space abutting the transversalis fascia, consistent with residual disease. She was initially being considered for repeat cryoablation of the left kidney lesion, but in recent months she started using a treadmill in an attempt to improve her health and lose weight when she thought she pulled a muscle in the right groin. The pain progressively worsened and she presented to [facility] ED. A CT scan 4/27/18 showed a new 4 cm lytic lesion in the inferior right pubic ramus with small soft tissue component suspicious for solitary metastatic disease. It also showed small bilateral pulmonary nodules which are nonspecific but may potentially represent metastatic disease.  She was evaluated by Dr Barbosa on 5/3/18 for palliative RT of this pubic ramus lesion.  She presents today for consideration of systemic treatment.  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  Initially started on 5/27/18.  40mg treatment held between 6/20/18 - 7/22/18, reduced to 20mg starting 7/23/18. [de-identified] : Patient presents s/p C4 Nivolumab on 2/4/19.  She is s/p pazopanib and s/p cabozantinib both discontinued due to H/F syndrome.  Hand/foot syndrome resolved in hands, feet resolved. + pain in right groin unchanged s/t metastatic disease, was improved s/p palliative RT, but again severe with continued severe pain in the right buttock. + intermittent diarrhea alternating with constipation. + hair thinning, unchanged. + fatigue, worse recently. + constipation, improved with lactulose.  + consistent nausea, constant but no emesis.  Appetite poor, has lost ~20lbs since the fall.  No rash, no pruritus.  No stomach pain, no vomiting. No other new complaints today.

## 2019-03-19 NOTE — RESULTS/DATA
[FreeTextEntry1] : 2/5/19 CT A/P: Findings compatible with interval disease progression.\par Increase in the previously noted enhancing left perinephric mass.\par Interval development/worsening of numerous pulmonary nodules measuring up to 1 cm with increase in size of the previously noted destructive lesion involving the right superior and inferior public rami bones with additional likely pelvic bony metastases.\par Hypervascular lesion in the posterior right hepatic lobe new from prior exam may represent a hypervascular metastasis. Stable right adrenal nodule.\par \par 11/3/18 CT C/A/P: Bilateral pulmonary nodules, some of which have slightly increased in size 8/16/18. Stable left renal and perinephric masses. Interval worsening osseous metastasis involving the right pubic ramus. \par \par 5/11/18 Pathology - right pubic lytic lesion, needle core biopsy:  Few clusters of neoplastic cells, histologically compatible with renal cell carcinoma - clear cell type, in background of necrosis and hemorrhage. \par \par 5/10/18 PET:  FDG avid soft tissue nodule in the left posterior pararenal space consistent with recurrent disease. Left lower pole renal lesion with enhancing mural nodule is better seen on MRI. Right lower pole renal lesion with central fat and peripheral FDG avidity is nonspecific, probably represents changes related to ablation. Continued follow-up is suggested. Expansile lytic lesion right pubic symphysis involving the anterior acetabulum and inferior pubic ramus, suspicious for metastasis. \par \par 4/26/18 CT Abdomen: Bibasilar micronodules and calcified granulomas. Bibasilar discoid atelectasis and/or scarring. Redemonstrated complex lesion in the left posterior perirenal space abutting Gerota's fascia is consistent with recurrent disease, better characterized on prior MRI. Again seen is the mid posterior left renal complex cystic lesion showing mural nodularity as seen on prior MRI. In the right posterior perirenal space, encapsulated fat with associated stranding likely reflects fat necrosis from prior ablation. New 4 cm lytic lesion in the inferior right pubic ramus with small soft tissue component and system with metastatic disease.\par \par 4/26/18 U/S RLE: No evidence for acute DVT in the right lower extremity deep venous systems. \par \par 3/23/18 MRI A/P: 2.2 right adrenal adenoma. 2.1 cm right myelolipoma. Right kidney: Posterior right lower pole lesion, measuring 1.6 x 1.6 cm, unchanged in size when compared to 8/30/2017 and does not demonstrate enhancement. Postoperative changes in the right lower pole including fat necrosis. Right renal cyst. Left kidney: Posterior left interpolar lesion measures 3.1 x 3 cm, previously measuring 3.8 x 3.4 cm on 8/30/2017, and demonstrates an enhancing mural nodule, suspicious for residual/recurrent disease. New 1.7 x 1.2 cm enhancing lesion in the left posterior pararenal space abutting the transversalis fascia, consistent with residual disease. \par \par 8/30/17 CT A/P: Postprocedural changes with halo of probable fat necrosis in the posterior right lower pole and posterior left interpolar region at the site of previously seen enhancing renal masses. The masses have slightly decreased in size and no longer demonstrate enhancement. For example: Posterior right lower pole lesion, measures 1.6 x 1.6 cm, previously 1.9 x 1.8 cm.  Posterior left interpolar lesion, measures 3.8 x 3.4 cm, previously 4.0 x 3.5 cm.  Since January 17, 2017, interval successful cryoablation of bilateral solid renal masses without evidence of residual or recurrent disease.  \par \par 5/16/17 Pathology: Kidney, left, mass, biopsy: Rare atypical cells, suspicious for renal cell carcinoma.  The biopsy shows predominantly necrotic material with rare atypical cells. The atypical cells are positive for PAX-8. The above finding are suspicious for renal cell carcinoma.\par \par 3/7/17 Pathology: KIDNEY, RIGHT LOWER, CT GUIDED CORE BX AND FNA SUSPICIOUS FOR NEOPLASM.  Cytology slides and cell block showing scant material, suspicious\par for clear cell carcinoma.  Core biopsy : Non-diagnostic material.  Although qualitatively suspicious as mentioned above, the quantity is very scant for any further studies for sub classification.

## 2019-03-19 NOTE — PHYSICAL EXAM
[Restricted in physically strenuous activity but ambulatory and able to carry out work of a light or sedentary nature] : Status 1- Restricted in physically strenuous activity but ambulatory and able to carry out work of a light or sedentary nature, e.g., light house work, office work [Normal] : affect appropriate [de-identified] : B/L plantar feet with few crescenteric blisters, L>R.  Erythema now resolved.  Xeroderma B/L hands

## 2019-03-19 NOTE — ADDENDUM
[FreeTextEntry1] : I, Rubén Adam, acted solely as a scribe for Dr. Holley Wise on this date 2/27/19.

## 2019-03-20 LAB
ALBUMIN SERPL ELPH-MCNC: 3.8 G/DL
ALP BLD-CCNC: 88 U/L
ALT SERPL-CCNC: 21 U/L
ANION GAP SERPL CALC-SCNC: 12 MMOL/L
AST SERPL-CCNC: 22 U/L
BILIRUB SERPL-MCNC: <0.2 MG/DL
BUN SERPL-MCNC: 18 MG/DL
CALCIUM SERPL-MCNC: 9.3 MG/DL
CHLORIDE SERPL-SCNC: 103 MMOL/L
CO2 SERPL-SCNC: 26 MMOL/L
CREAT SERPL-MCNC: 1.05 MG/DL
GLUCOSE SERPL-MCNC: 115 MG/DL
MAGNESIUM SERPL-MCNC: 2 MG/DL
POTASSIUM SERPL-SCNC: 4.6 MMOL/L
PROT SERPL-MCNC: 6.8 G/DL
SODIUM SERPL-SCNC: 141 MMOL/L

## 2019-03-21 ENCOUNTER — OUTPATIENT (OUTPATIENT)
Dept: OUTPATIENT SERVICES | Facility: HOSPITAL | Age: 64
LOS: 1 days | Discharge: HOME | End: 2019-03-21
Payer: MEDICARE

## 2019-03-21 ENCOUNTER — APPOINTMENT (OUTPATIENT)
Dept: RADIATION ONCOLOGY | Facility: CLINIC | Age: 64
End: 2019-03-21
Payer: MEDICARE

## 2019-03-21 DIAGNOSIS — Z98.890 OTHER SPECIFIED POSTPROCEDURAL STATES: Chronic | ICD-10-CM

## 2019-03-21 DIAGNOSIS — Z92.3 PERSONAL HISTORY OF IRRADIATION: ICD-10-CM

## 2019-03-21 DIAGNOSIS — Z95.2 PRESENCE OF PROSTHETIC HEART VALVE: Chronic | ICD-10-CM

## 2019-03-21 PROCEDURE — 99215 OFFICE O/P EST HI 40 MIN: CPT | Mod: 25

## 2019-03-21 PROCEDURE — 77263 THER RADIOLOGY TX PLNG CPLX: CPT

## 2019-03-21 NOTE — PHYSICAL EXAM
[Normal] : oriented to person, place and time, the affect was normal, the mood was normal and not anxious [Obese] : obese [No Focal Deficits] : no focal deficits [Sensation] : the sensory exam was normal to light touch and pinprick [Motor Exam] : the motor exam was normal [de-identified] : deferred [FreeTextEntry1] : deferred [de-identified] : deferred [de-identified] : tenderness over lower cervial and thoracic spine with deep palpation , Uses walker for ambulation.

## 2019-03-21 NOTE — REASON FOR VISIT
[Routine Follow-Up] : routine follow-up visit for [Other: ___] : [unfilled] [Family Member] : family member [Bone Metastasis] : bone metastasis

## 2019-03-21 NOTE — DISEASE MANAGEMENT
[Clinical] : TNM Stage: c [IV] : IV [FreeTextEntry4] : Metastatic renal cell [TTNM] : 1b [NTNM] : 0 [MTNM] : 1 [de-identified] : 4,000 cGy [de-identified] : right pubic ramus

## 2019-03-21 NOTE — REVIEW OF SYSTEMS
[Negative] : Endocrine [Anal Pain: Grade 0] : Anal Pain: Grade 0 [Constipation: Grade 0] : Constipation: Grade 0 [Diarrhea: Grade 0] : Diarrhea: Grade 0 [Fecal Incontinence: Grade 0] : Fecal Incontinence: Grade 0 [Nausea: Grade 2 - Oral intake decreased without significant weight loss, dehydration or malnutrition] : Nausea: Grade 2 - Oral intake decreased without significant weight loss, dehydration or malnutrition [Fatigue: Grade 1 - Fatigue relieved by rest] : Fatigue: Grade 1 - Fatigue relieved by rest [Hematuria: Grade 0] : Hematuria: Grade 0 [Urinary Incontinence: Grade 0] : Urinary Incontinence: Grade 0  [Urinary Retention: Grade 0] : Urinary Retention: Grade 0 [Urinary Tract Pain: Grade 0] : Urinary Tract Pain: Grade 0 [Urinary Urgency: Grade 0] : Urinary Urgency: Grade 0 [Urinary Frequency: Grade 0] : Urinary Frequency: Grade 0 [Skin Hyperpigmentation: Grade 0] : Skin Hyperpigmentation: Grade 0 [Dermatitis Radiation: Grade 0] : Dermatitis Radiation: Grade 0

## 2019-03-21 NOTE — ASSESSMENT
[Metastatic disease without local control] : Metastatic disease without local control [FreeTextEntry1] : Severe pain requiring high dose narcotic analgesics in spine( new) like due to a combination of DJD and metastasis. Pelvic pain persists despite 2 courses of palliative radiotherapy.

## 2019-03-21 NOTE — LETTER CLOSING
[Sincerely yours,] : Sincerely yours, [FreeTextEntry3] : Jeffrey Barbosa MD\par Physician in Chief\par Department of Radiation Medicine\par [facility]\par [facility]\par \par  of Radiation Medicine\par [facility]\par at  [facility]\par \par Radiation \par [facility]/\par [facility] [place]\par [address]\par [place], [place] [address]\par \par Tel: [phone]\par Fax: [phone]\par

## 2019-03-21 NOTE — HISTORY OF PRESENT ILLNESS
[FreeTextEntry1] : Ms. Winters is a 63 year old female who returns today for follow up.  She is s/p radiation therapy (2,000 cGy) for [id number], stage lV metastatic renal cell carcinoma to bone pubic ramus, completed 9/17/18.  Was on Votrient with Dr. Wise currently on hold. Current pain is 8 out of 10 to right pelvis area uses fentanyl patch and 50 mcg oxycodone for breakthrough. C/o fatigue,  Denies change in bowel & urination. She has antalgic gait and ambulates with walker.   She follows with Dr Wise and is on C5D15 Nivolumab.  \par \par Ms. Winters reports new onset pain in her cervical spine and back or head.  She was presented to the ED and CT scans were done which showed new lesions.  MRI was then performed and strongly supported the presence of metastatic lesions in the thoracic spine , but only degenerative disease in the cervical spine.\par

## 2019-03-21 NOTE — VITALS
[Maximal Pain Intensity: 8/10] : 8/10 [Least Pain Intensity: 4/10] : 4/10 [Pain Description/Quality: ___] : Pain description/quality: [unfilled] [Pain Duration: ___] : Pain duration: [unfilled] [Pain Location: ___] : Pain Location: [unfilled] [Pain Interferes with ADLs] : Pain interferes with activities of daily living. [Opioid] : opioid [70: Cares for self; unalbe to carry on normal activity or do active work.] : 70: Cares for self; unable to carry on normal activity or do active work. [ECOG Performance Status: 2 - Ambulatory and capable of all self care but unable to carry out any work activities] : Performance Status: 2 - Ambulatory and capable of all self care but unable to carry out any work activities. Up and about more than 50% of waking hours

## 2019-03-25 PROCEDURE — 77334 RADIATION TREATMENT AID(S): CPT | Mod: 26

## 2019-03-25 PROCEDURE — 77290 THER RAD SIMULAJ FIELD CPLX: CPT | Mod: 26

## 2019-03-26 NOTE — ASSESSMENT
[Palliative] : Goals of care discussed with patient: Palliative [FreeTextEntry1] :  risk stratification: good\par -Good risk: None of above risk factors present.\par -Intermediate risk: 1 or 2 of above risk factors present.\par -Poor risk: 3 or more risk factors present.\par \par Karnofsky performance score <80 -no\par Time from original diagnosis to initiation of targeted therapy <1 year  - no\par Hemoglobin less than the lower limit of normal -no\par Serum calcium greater than the upper limit of normal -no\par Neutrophil count greater than the upper limit of normal -no\par Platelet count greater than the upper limit of normal \par \par Systemic therapy is initiated promptly when metastatic or locally advanced disease, is present. However, for asymptomatic patients with a limited disease burden and no poor prognostic features, close active surveillance is an alternative for those wanting to defer the initiation of therapy until disease progression. Immunotherapy with checkpoint inhibitors and molecularly targeted therapy are the primary systemic modalities. For intermediate- and poor-risk patients for whom the combination of nivolumab plus ipilimumab is not available, antiangiogenic targeted therapy is the primary option. Among the available agents, the preferred agents for initial therapy are cabozantinib, pazopanib and sunitinib. There are no data on nivolumab monotherapy in the front-line setting, which may also be an alternative for some patients. If POD, would reserve nivolumab monotherapy for second line. Starting dose of cabozantinib is 60 mg; however, given her KPS and comorbidities, will opt to start her at 40 mg and titrate up as tolerated. For those with good-risk disease, antiangiogenic targeted therapy is the primary option.\par \par Finally, the safety and efficacy of the combination of nivolumab plus ipilimumab in advanced clear-cell RCC was established in the phase III Checkmate 214 trial. Previously untreated patients with advanced or metastatic clear-cell RCC were randomly assigned to nivolumab plus ipilimumab or to sunitinib. The combination of nivolumab (3 mg/kg) plus ipilimumab (1 mg/kg) was given every three weeks for four doses and was then followed by nivolumab (3 mg/kg).

## 2019-03-26 NOTE — HISTORY OF PRESENT ILLNESS
[de-identified] : The patient was diagnosed with recurrent renal cell carcinoma in March 2018 at the age of 62.  She was initially diagnosed with B/L clear cell renal cell carcinoma in March 2017 and has been followed by Urology, Dr. Rondey Rodriguez.  She underwent percutaneous cryoablation (considered a high surgical risk due to her multiple comorbidities) of a right renal mass in March 2017 and a left renal mass in May 2017 by IR, Dr. Waqar Landa.  Pathology was suspicious for clear cell carcinoma.  Her first follow up CT A/P on 8/30/17 stated interval successful cryoablation of bilateral solid renal masses without evidence of residual or recurrent disease.  Follow up MRI on 3/23/18 showed a posterior left interpolar lesion measures 3.1 x 3 cm, previously measuring 3.8 x 3.4 cm on 8/30/2017, and demonstrates an enhancing mural nodule, suspicious for residual/recurrent disease. New 1.7 x 1.2 cm enhancing lesion in the left posterior pararenal space abutting the transversalis fascia, consistent with residual disease. She was initially being considered for repeat cryoablation of the left kidney lesion, but in recent months she started using a treadmill in an attempt to improve her health and lose weight when she thought she pulled a muscle in the right groin. The pain progressively worsened and she presented to [facility] ED. A CT scan 4/27/18 showed a new 4 cm lytic lesion in the inferior right pubic ramus with small soft tissue component suspicious for solitary metastatic disease. It also showed small bilateral pulmonary nodules which are nonspecific but may potentially represent metastatic disease.  She was evaluated by Dr Barbosa on 5/3/18 for palliative RT of this pubic ramus lesion.  She presents today for consideration of systemic treatment.  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  Initially started on 5/27/18.  40mg treatment held between 6/20/18 - 7/22/18, reduced to 20mg starting 7/23/18. [de-identified] : Patient presents on C5D15 Nivolumab.  She is s/p pazopanib and s/p cabozantinib both discontinued due to H/F syndrome.  Hand/foot syndrome remains resolved.  + Neck pain with radiation up the occipital and right parietal head is severe, constant 10/10 with slight improvement with pain meds.  LBP and pelvis pain, right groin pain all unchanged recently but remain severe.  + Recent onset of tongue deviating to the right. + Intermittent diarrhea alternating with constipation, unchanged. + Hair thinning, unchanged. + Fatigue, worse recently.  + Consistent nausea but no emesis.  Appetite poor but has maintained weight well. No rash, no pruritus.

## 2019-03-26 NOTE — PHYSICAL EXAM
[Restricted in physically strenuous activity but ambulatory and able to carry out work of a light or sedentary nature] : Status 1- Restricted in physically strenuous activity but ambulatory and able to carry out work of a light or sedentary nature, e.g., light house work, office work [Normal] : affect appropriate [de-identified] : B/L plantar feet with few crescenteric blisters, L>R.  Erythema now resolved.  Xeroderma B/L hands

## 2019-03-27 ENCOUNTER — OUTPATIENT (OUTPATIENT)
Dept: OUTPATIENT SERVICES | Facility: HOSPITAL | Age: 64
LOS: 1 days | Discharge: HOME | End: 2019-03-27

## 2019-03-27 DIAGNOSIS — C79.51 SECONDARY MALIGNANT NEOPLASM OF BONE: ICD-10-CM

## 2019-03-27 DIAGNOSIS — Z95.2 PRESENCE OF PROSTHETIC HEART VALVE: Chronic | ICD-10-CM

## 2019-03-27 DIAGNOSIS — C64.9 MALIGNANT NEOPLASM OF UNSPECIFIED KIDNEY, EXCEPT RENAL PELVIS: ICD-10-CM

## 2019-03-27 DIAGNOSIS — Z98.890 OTHER SPECIFIED POSTPROCEDURAL STATES: Chronic | ICD-10-CM

## 2019-03-27 PROCEDURE — 77306 TELETHX ISODOSE PLAN SIMPLE: CPT | Mod: 26

## 2019-03-27 PROCEDURE — 77332 RADIATION TREATMENT AID(S): CPT | Mod: 26

## 2019-03-29 PROCEDURE — 77280 THER RAD SIMULAJ FIELD SMPL: CPT | Mod: 26

## 2019-04-01 VITALS
HEART RATE: 93 BPM | SYSTOLIC BLOOD PRESSURE: 125 MMHG | WEIGHT: 213 LBS | TEMPERATURE: 98 F | DIASTOLIC BLOOD PRESSURE: 72 MMHG | RESPIRATION RATE: 16 BRPM | OXYGEN SATURATION: 96 % | BODY MASS INDEX: 36.56 KG/M2

## 2019-04-01 NOTE — DISEASE MANAGEMENT
[Clinical] : TNM Stage: c [FreeTextEntry4] : metastatic renal cell carcinoma [TTNM] : 1b [NTNM] : 0 [MTNM] : 1 [IV] : IV [de-identified] : 800 cGy [de-identified] : 2,000 cGy [de-identified] : thoracic spine

## 2019-04-04 PROCEDURE — 77427 RADIATION TX MANAGEMENT X5: CPT

## 2019-04-05 ENCOUNTER — LABORATORY RESULT (OUTPATIENT)
Age: 64
End: 2019-04-05

## 2019-04-05 ENCOUNTER — APPOINTMENT (OUTPATIENT)
Age: 64
End: 2019-04-05

## 2019-04-05 ENCOUNTER — RESULT REVIEW (OUTPATIENT)
Age: 64
End: 2019-04-05

## 2019-04-05 LAB
BASOPHILS # BLD AUTO: 0.1 K/UL — SIGNIFICANT CHANGE UP (ref 0–0.2)
BASOPHILS NFR BLD AUTO: 0.6 % — SIGNIFICANT CHANGE UP (ref 0–2)
EOSINOPHIL # BLD AUTO: 0.1 K/UL — SIGNIFICANT CHANGE UP (ref 0–0.5)
EOSINOPHIL NFR BLD AUTO: 1.6 % — SIGNIFICANT CHANGE UP (ref 0–6)
HCT VFR BLD CALC: 36.9 % — SIGNIFICANT CHANGE UP (ref 34.5–45)
HGB BLD-MCNC: 11.6 G/DL — SIGNIFICANT CHANGE UP (ref 11.5–15.5)
LYMPHOCYTES # BLD AUTO: 1.2 K/UL — SIGNIFICANT CHANGE UP (ref 1–3.3)
LYMPHOCYTES # BLD AUTO: 14.1 % — SIGNIFICANT CHANGE UP (ref 13–44)
MCHC RBC-ENTMCNC: 30.7 PG — SIGNIFICANT CHANGE UP (ref 27–34)
MCHC RBC-ENTMCNC: 31.4 G/DL — LOW (ref 32–36)
MCV RBC AUTO: 97.8 FL — SIGNIFICANT CHANGE UP (ref 80–100)
MONOCYTES # BLD AUTO: 0.9 K/UL — SIGNIFICANT CHANGE UP (ref 0–0.9)
MONOCYTES NFR BLD AUTO: 10.8 % — SIGNIFICANT CHANGE UP (ref 2–14)
NEUTROPHILS # BLD AUTO: 6.1 K/UL — SIGNIFICANT CHANGE UP (ref 1.8–7.4)
NEUTROPHILS NFR BLD AUTO: 72.9 % — SIGNIFICANT CHANGE UP (ref 43–77)
PLATELET # BLD AUTO: 282 K/UL — SIGNIFICANT CHANGE UP (ref 150–400)
RBC # BLD: 3.78 M/UL — LOW (ref 3.8–5.2)
RBC # FLD: 14 % — SIGNIFICANT CHANGE UP (ref 10.3–14.5)
WBC # BLD: 8.4 K/UL — SIGNIFICANT CHANGE UP (ref 3.8–10.5)
WBC # FLD AUTO: 8.4 K/UL — SIGNIFICANT CHANGE UP (ref 3.8–10.5)

## 2019-04-08 DIAGNOSIS — Z51.11 ENCOUNTER FOR ANTINEOPLASTIC CHEMOTHERAPY: ICD-10-CM

## 2019-04-08 DIAGNOSIS — R11.2 NAUSEA WITH VOMITING, UNSPECIFIED: ICD-10-CM

## 2019-04-08 RX ORDER — LORAZEPAM 0.5 MG/1
0.5 TABLET ORAL
Qty: 90 | Refills: 0 | Status: ACTIVE | COMMUNITY
Start: 2019-04-08 | End: 1900-01-01

## 2019-04-08 RX ORDER — FENTANYL 100 UG/H
100 PATCH, EXTENDED RELEASE TRANSDERMAL
Qty: 10 | Refills: 0 | Status: ACTIVE | COMMUNITY
Start: 2019-03-19 | End: 1900-01-01

## 2019-04-08 RX ORDER — ONDANSETRON 8 MG/1
8 TABLET, ORALLY DISINTEGRATING ORAL EVERY 8 HOURS
Qty: 90 | Refills: 3 | Status: ACTIVE | COMMUNITY
Start: 2018-11-14 | End: 1900-01-01

## 2019-04-10 ENCOUNTER — APPOINTMENT (OUTPATIENT)
Dept: HEMATOLOGY ONCOLOGY | Facility: CLINIC | Age: 64
End: 2019-04-10

## 2019-04-12 PROBLEM — C79.51 BONE METASTASES: Status: ACTIVE | Noted: 2018-07-16

## 2019-04-15 ENCOUNTER — RESULT REVIEW (OUTPATIENT)
Age: 64
End: 2019-04-15

## 2019-04-15 ENCOUNTER — APPOINTMENT (OUTPATIENT)
Age: 64
End: 2019-04-15
Payer: MEDICARE

## 2019-04-15 VITALS
HEIGHT: 64 IN | SYSTOLIC BLOOD PRESSURE: 92 MMHG | WEIGHT: 205.03 LBS | DIASTOLIC BLOOD PRESSURE: 58 MMHG | OXYGEN SATURATION: 93 % | HEART RATE: 104 BPM | BODY MASS INDEX: 35 KG/M2

## 2019-04-15 DIAGNOSIS — C64.9 MALIGNANT NEOPLASM OF UNSPECIFIED KIDNEY, EXCEPT RENAL PELVIS: ICD-10-CM

## 2019-04-15 DIAGNOSIS — G89.3 NEOPLASM RELATED PAIN (ACUTE) (CHRONIC): ICD-10-CM

## 2019-04-15 DIAGNOSIS — M54.2 CERVICALGIA: ICD-10-CM

## 2019-04-15 DIAGNOSIS — F41.9 ANXIETY DISORDER, UNSPECIFIED: ICD-10-CM

## 2019-04-15 DIAGNOSIS — C79.51 SECONDARY MALIGNANT NEOPLASM OF BONE: ICD-10-CM

## 2019-04-15 LAB
BASOPHILS # BLD AUTO: 0.1 K/UL — SIGNIFICANT CHANGE UP (ref 0–0.2)
BASOPHILS NFR BLD AUTO: 0.9 % — SIGNIFICANT CHANGE UP (ref 0–2)
EOSINOPHIL # BLD AUTO: 0.1 K/UL — SIGNIFICANT CHANGE UP (ref 0–0.5)
EOSINOPHIL NFR BLD AUTO: 0.6 % — SIGNIFICANT CHANGE UP (ref 0–6)
HCT VFR BLD CALC: 38.6 % — SIGNIFICANT CHANGE UP (ref 34.5–45)
HGB BLD-MCNC: 11.9 G/DL — SIGNIFICANT CHANGE UP (ref 11.5–15.5)
LYMPHOCYTES # BLD AUTO: 1.7 K/UL — SIGNIFICANT CHANGE UP (ref 1–3.3)
LYMPHOCYTES # BLD AUTO: 16.1 % — SIGNIFICANT CHANGE UP (ref 13–44)
MCHC RBC-ENTMCNC: 30.2 PG — SIGNIFICANT CHANGE UP (ref 27–34)
MCHC RBC-ENTMCNC: 30.9 G/DL — LOW (ref 32–36)
MCV RBC AUTO: 97.6 FL — SIGNIFICANT CHANGE UP (ref 80–100)
MONOCYTES # BLD AUTO: 0.8 K/UL — SIGNIFICANT CHANGE UP (ref 0–0.9)
MONOCYTES NFR BLD AUTO: 8 % — SIGNIFICANT CHANGE UP (ref 2–14)
NEUTROPHILS # BLD AUTO: 7.9 K/UL — HIGH (ref 1.8–7.4)
NEUTROPHILS NFR BLD AUTO: 74.5 % — SIGNIFICANT CHANGE UP (ref 43–77)
PLATELET # BLD AUTO: 256 K/UL — SIGNIFICANT CHANGE UP (ref 150–400)
RBC # BLD: 3.96 M/UL — SIGNIFICANT CHANGE UP (ref 3.8–5.2)
RBC # FLD: 14 % — SIGNIFICANT CHANGE UP (ref 10.3–14.5)
WBC # BLD: 10.6 K/UL — HIGH (ref 3.8–10.5)
WBC # FLD AUTO: 10.6 K/UL — HIGH (ref 3.8–10.5)

## 2019-04-15 PROCEDURE — 99215 OFFICE O/P EST HI 40 MIN: CPT

## 2019-04-15 RX ORDER — LACTULOSE 10 G/15ML
10 SOLUTION ORAL
Qty: 900 | Refills: 3 | Status: ACTIVE | COMMUNITY
Start: 2018-12-19 | End: 1900-01-01

## 2019-04-15 RX ORDER — TIZANIDINE 2 MG/1
2 TABLET ORAL EVERY 6 HOURS
Qty: 60 | Refills: 1 | Status: ACTIVE | COMMUNITY
Start: 2019-03-15 | End: 1900-01-01

## 2019-04-17 LAB
ALBUMIN SERPL ELPH-MCNC: 3.6 G/DL
ALP BLD-CCNC: 86 U/L
ALT SERPL-CCNC: 12 U/L
ANION GAP SERPL CALC-SCNC: 13 MMOL/L
AST SERPL-CCNC: 22 U/L
BILIRUB SERPL-MCNC: 0.4 MG/DL
BUN SERPL-MCNC: 22 MG/DL
CALCIUM SERPL-MCNC: 9 MG/DL
CHLORIDE SERPL-SCNC: 101 MMOL/L
CO2 SERPL-SCNC: 26 MMOL/L
CREAT SERPL-MCNC: 1.21 MG/DL
GLUCOSE SERPL-MCNC: 110 MG/DL
MAGNESIUM SERPL-MCNC: 2.4 MG/DL
POTASSIUM SERPL-SCNC: 5.5 MMOL/L
PROT SERPL-MCNC: 6.8 G/DL
SODIUM SERPL-SCNC: 140 MMOL/L

## 2019-04-27 ENCOUNTER — OUTPATIENT (OUTPATIENT)
Dept: OUTPATIENT SERVICES | Facility: HOSPITAL | Age: 64
LOS: 1 days | Discharge: HOME | End: 2019-04-27

## 2019-04-27 DIAGNOSIS — Z95.2 PRESENCE OF PROSTHETIC HEART VALVE: Chronic | ICD-10-CM

## 2019-04-27 DIAGNOSIS — Z98.890 OTHER SPECIFIED POSTPROCEDURAL STATES: Chronic | ICD-10-CM

## 2019-04-27 DIAGNOSIS — C79.51 SECONDARY MALIGNANT NEOPLASM OF BONE: ICD-10-CM

## 2019-05-02 ENCOUNTER — APPOINTMENT (OUTPATIENT)
Dept: RADIATION ONCOLOGY | Facility: CLINIC | Age: 64
End: 2019-05-02

## 2019-05-02 ENCOUNTER — LABORATORY RESULT (OUTPATIENT)
Age: 64
End: 2019-05-02

## 2019-05-02 ENCOUNTER — APPOINTMENT (OUTPATIENT)
Age: 64
End: 2019-05-02

## 2019-05-02 ENCOUNTER — INPATIENT (INPATIENT)
Facility: HOSPITAL | Age: 64
LOS: 0 days | DRG: 40 | End: 2019-05-03
Attending: INTERNAL MEDICINE | Admitting: INTERNAL MEDICINE
Payer: MEDICARE

## 2019-05-02 VITALS
OXYGEN SATURATION: 95 % | RESPIRATION RATE: 18 BRPM | TEMPERATURE: 99 F | SYSTOLIC BLOOD PRESSURE: 167 MMHG | DIASTOLIC BLOOD PRESSURE: 85 MMHG | WEIGHT: 210.1 LBS | HEIGHT: 64 IN | HEART RATE: 111 BPM

## 2019-05-02 DIAGNOSIS — Z95.2 PRESENCE OF PROSTHETIC HEART VALVE: Chronic | ICD-10-CM

## 2019-05-02 DIAGNOSIS — C64.9 MALIGNANT NEOPLASM OF UNSPECIFIED KIDNEY, EXCEPT RENAL PELVIS: ICD-10-CM

## 2019-05-02 DIAGNOSIS — C79.31 SECONDARY MALIGNANT NEOPLASM OF BRAIN: ICD-10-CM

## 2019-05-02 DIAGNOSIS — I26.99 OTHER PULMONARY EMBOLISM WITHOUT ACUTE COR PULMONALE: ICD-10-CM

## 2019-05-02 DIAGNOSIS — I62.9 NONTRAUMATIC INTRACRANIAL HEMORRHAGE, UNSPECIFIED: ICD-10-CM

## 2019-05-02 DIAGNOSIS — Z71.89 OTHER SPECIFIED COUNSELING: ICD-10-CM

## 2019-05-02 DIAGNOSIS — F41.9 ANXIETY DISORDER, UNSPECIFIED: ICD-10-CM

## 2019-05-02 DIAGNOSIS — R09.89 OTHER SPECIFIED SYMPTOMS AND SIGNS INVOLVING THE CIRCULATORY AND RESPIRATORY SYSTEMS: ICD-10-CM

## 2019-05-02 DIAGNOSIS — Z98.890 OTHER SPECIFIED POSTPROCEDURAL STATES: Chronic | ICD-10-CM

## 2019-05-02 LAB
ALBUMIN SERPL ELPH-MCNC: 3.7 G/DL — SIGNIFICANT CHANGE UP (ref 3.3–5.2)
ALP SERPL-CCNC: 101 U/L — SIGNIFICANT CHANGE UP (ref 40–120)
ALT FLD-CCNC: 13 U/L — SIGNIFICANT CHANGE UP
ANION GAP SERPL CALC-SCNC: 14 MMOL/L — SIGNIFICANT CHANGE UP (ref 5–17)
APPEARANCE UR: CLEAR — SIGNIFICANT CHANGE UP
APTT BLD: 29.4 SEC — SIGNIFICANT CHANGE UP (ref 27.5–36.3)
AST SERPL-CCNC: 24 U/L — SIGNIFICANT CHANGE UP
BACTERIA # UR AUTO: NEGATIVE — SIGNIFICANT CHANGE UP
BASOPHILS # BLD AUTO: 0 K/UL — SIGNIFICANT CHANGE UP (ref 0–0.2)
BASOPHILS NFR BLD AUTO: 0.3 % — SIGNIFICANT CHANGE UP (ref 0–2)
BILIRUB SERPL-MCNC: 0.6 MG/DL — SIGNIFICANT CHANGE UP (ref 0.4–2)
BILIRUB UR-MCNC: NEGATIVE — SIGNIFICANT CHANGE UP
BUN SERPL-MCNC: 20 MG/DL — SIGNIFICANT CHANGE UP (ref 8–20)
CALCIUM SERPL-MCNC: 9.6 MG/DL — SIGNIFICANT CHANGE UP (ref 8.6–10.2)
CHLORIDE SERPL-SCNC: 104 MMOL/L — SIGNIFICANT CHANGE UP (ref 98–107)
CO2 SERPL-SCNC: 22 MMOL/L — SIGNIFICANT CHANGE UP (ref 22–29)
COLOR SPEC: YELLOW — SIGNIFICANT CHANGE UP
CREAT SERPL-MCNC: 0.92 MG/DL — SIGNIFICANT CHANGE UP (ref 0.5–1.3)
DIFF PNL FLD: NEGATIVE — SIGNIFICANT CHANGE UP
EOSINOPHIL # BLD AUTO: 0.1 K/UL — SIGNIFICANT CHANGE UP (ref 0–0.5)
EOSINOPHIL NFR BLD AUTO: 0.6 % — SIGNIFICANT CHANGE UP (ref 0–6)
EPI CELLS # UR: SIGNIFICANT CHANGE UP
GLUCOSE SERPL-MCNC: 159 MG/DL — HIGH (ref 70–115)
GLUCOSE UR QL: NEGATIVE MG/DL — SIGNIFICANT CHANGE UP
HCT VFR BLD CALC: 34.8 % — LOW (ref 37–47)
HGB BLD-MCNC: 10.9 G/DL — LOW (ref 12–16)
INR BLD: 1.23 RATIO — HIGH (ref 0.88–1.16)
KETONES UR-MCNC: ABNORMAL
LACTATE BLDV-MCNC: 1.5 MMOL/L — SIGNIFICANT CHANGE UP (ref 0.5–2)
LACTATE BLDV-MCNC: 2.2 MMOL/L — HIGH (ref 0.5–2)
LEUKOCYTE ESTERASE UR-ACNC: NEGATIVE — SIGNIFICANT CHANGE UP
LYMPHOCYTES # BLD AUTO: 0.8 K/UL — LOW (ref 1–4.8)
LYMPHOCYTES # BLD AUTO: 7.7 % — LOW (ref 20–55)
MCHC RBC-ENTMCNC: 30.3 PG — SIGNIFICANT CHANGE UP (ref 27–31)
MCHC RBC-ENTMCNC: 31.3 G/DL — LOW (ref 32–36)
MCV RBC AUTO: 96.7 FL — SIGNIFICANT CHANGE UP (ref 81–99)
MONOCYTES # BLD AUTO: 1 K/UL — HIGH (ref 0–0.8)
MONOCYTES NFR BLD AUTO: 9.6 % — SIGNIFICANT CHANGE UP (ref 3–10)
NEUTROPHILS # BLD AUTO: 8.7 K/UL — HIGH (ref 1.8–8)
NEUTROPHILS NFR BLD AUTO: 80.7 % — HIGH (ref 37–73)
NITRITE UR-MCNC: NEGATIVE — SIGNIFICANT CHANGE UP
PH UR: 6 — SIGNIFICANT CHANGE UP (ref 5–8)
PLATELET # BLD AUTO: 241 K/UL — SIGNIFICANT CHANGE UP (ref 150–400)
POTASSIUM SERPL-MCNC: 5 MMOL/L — SIGNIFICANT CHANGE UP (ref 3.5–5.3)
POTASSIUM SERPL-SCNC: 5 MMOL/L — SIGNIFICANT CHANGE UP (ref 3.5–5.3)
PROT SERPL-MCNC: 8.1 G/DL — SIGNIFICANT CHANGE UP (ref 6.6–8.7)
PROT UR-MCNC: 30 MG/DL
PROTHROM AB SERPL-ACNC: 14.2 SEC — HIGH (ref 10–12.9)
RBC # BLD: 3.6 M/UL — LOW (ref 4.4–5.2)
RBC # FLD: 15.8 % — HIGH (ref 11–15.6)
RBC CASTS # UR COMP ASSIST: NEGATIVE /HPF — SIGNIFICANT CHANGE UP (ref 0–4)
SODIUM SERPL-SCNC: 140 MMOL/L — SIGNIFICANT CHANGE UP (ref 135–145)
SP GR SPEC: 1.01 — SIGNIFICANT CHANGE UP (ref 1.01–1.02)
UROBILINOGEN FLD QL: NEGATIVE MG/DL — SIGNIFICANT CHANGE UP
WBC # BLD: 10.8 K/UL — SIGNIFICANT CHANGE UP (ref 4.8–10.8)
WBC # FLD AUTO: 10.8 K/UL — SIGNIFICANT CHANGE UP (ref 4.8–10.8)
WBC UR QL: SIGNIFICANT CHANGE UP

## 2019-05-02 PROCEDURE — 99223 1ST HOSP IP/OBS HIGH 75: CPT

## 2019-05-02 PROCEDURE — 37191 INS ENDOVAS VENA CAVA FILTR: CPT

## 2019-05-02 PROCEDURE — 99221 1ST HOSP IP/OBS SF/LOW 40: CPT

## 2019-05-02 PROCEDURE — 99291 CRITICAL CARE FIRST HOUR: CPT

## 2019-05-02 PROCEDURE — 99222 1ST HOSP IP/OBS MODERATE 55: CPT

## 2019-05-02 PROCEDURE — 93010 ELECTROCARDIOGRAM REPORT: CPT

## 2019-05-02 PROCEDURE — 71275 CT ANGIOGRAPHY CHEST: CPT | Mod: 26

## 2019-05-02 PROCEDURE — 71045 X-RAY EXAM CHEST 1 VIEW: CPT | Mod: 26

## 2019-05-02 PROCEDURE — 70450 CT HEAD/BRAIN W/O DYE: CPT | Mod: 26

## 2019-05-02 PROCEDURE — 93970 EXTREMITY STUDY: CPT | Mod: 26

## 2019-05-02 RX ORDER — GABAPENTIN 400 MG/1
600 CAPSULE ORAL THREE TIMES A DAY
Qty: 0 | Refills: 0 | Status: DISCONTINUED | OUTPATIENT
Start: 2019-05-02 | End: 2019-05-02

## 2019-05-02 RX ORDER — DEXAMETHASONE 0.5 MG/5ML
10 ELIXIR ORAL ONCE
Qty: 0 | Refills: 0 | Status: COMPLETED | OUTPATIENT
Start: 2019-05-02 | End: 2019-05-02

## 2019-05-02 RX ORDER — ALBUTEROL 90 UG/1
2 AEROSOL, METERED ORAL EVERY 6 HOURS
Qty: 0 | Refills: 0 | Status: DISCONTINUED | OUTPATIENT
Start: 2019-05-02 | End: 2019-05-02

## 2019-05-02 RX ORDER — LISINOPRIL 2.5 MG/1
5 TABLET ORAL DAILY
Qty: 0 | Refills: 0 | Status: DISCONTINUED | OUTPATIENT
Start: 2019-05-02 | End: 2019-05-02

## 2019-05-02 RX ORDER — SCOPALAMINE 1 MG/3D
1 PATCH, EXTENDED RELEASE TRANSDERMAL
Qty: 0 | Refills: 0 | Status: DISCONTINUED | OUTPATIENT
Start: 2019-05-02 | End: 2019-05-03

## 2019-05-02 RX ORDER — MORPHINE SULFATE 50 MG/1
4 CAPSULE, EXTENDED RELEASE ORAL
Qty: 100 | Refills: 0 | Status: DISCONTINUED | OUTPATIENT
Start: 2019-05-02 | End: 2019-05-03

## 2019-05-02 RX ORDER — DEXAMETHASONE 0.5 MG/5ML
4 ELIXIR ORAL EVERY 6 HOURS
Qty: 0 | Refills: 0 | Status: DISCONTINUED | OUTPATIENT
Start: 2019-05-02 | End: 2019-05-03

## 2019-05-02 RX ORDER — MORPHINE SULFATE 50 MG/1
4 CAPSULE, EXTENDED RELEASE ORAL
Qty: 0 | Refills: 0 | Status: DISCONTINUED | OUTPATIENT
Start: 2019-05-02 | End: 2019-05-03

## 2019-05-02 RX ORDER — PIPERACILLIN AND TAZOBACTAM 4; .5 G/20ML; G/20ML
3.38 INJECTION, POWDER, LYOPHILIZED, FOR SOLUTION INTRAVENOUS ONCE
Qty: 0 | Refills: 0 | Status: COMPLETED | OUTPATIENT
Start: 2019-05-02 | End: 2019-05-02

## 2019-05-02 RX ORDER — FENTANYL CITRATE 50 UG/ML
25 INJECTION INTRAVENOUS ONCE
Qty: 0 | Refills: 0 | Status: DISCONTINUED | OUTPATIENT
Start: 2019-05-02 | End: 2019-05-02

## 2019-05-02 RX ORDER — MORPHINE SULFATE 50 MG/1
2 CAPSULE, EXTENDED RELEASE ORAL
Qty: 250 | Refills: 0 | Status: DISCONTINUED | OUTPATIENT
Start: 2019-05-02 | End: 2019-05-02

## 2019-05-02 RX ORDER — MORPHINE SULFATE 50 MG/1
2 CAPSULE, EXTENDED RELEASE ORAL EVERY 4 HOURS
Qty: 0 | Refills: 0 | Status: DISCONTINUED | OUTPATIENT
Start: 2019-05-02 | End: 2019-05-02

## 2019-05-02 RX ORDER — CHLORHEXIDINE GLUCONATE 213 G/1000ML
1 SOLUTION TOPICAL DAILY
Qty: 0 | Refills: 0 | Status: DISCONTINUED | OUTPATIENT
Start: 2019-05-02 | End: 2019-05-03

## 2019-05-02 RX ORDER — ALPRAZOLAM 0.25 MG
0.25 TABLET ORAL THREE TIMES A DAY
Qty: 0 | Refills: 0 | Status: DISCONTINUED | OUTPATIENT
Start: 2019-05-02 | End: 2019-05-02

## 2019-05-02 RX ORDER — CHLORHEXIDINE GLUCONATE 213 G/1000ML
1 SOLUTION TOPICAL
Qty: 0 | Refills: 0 | Status: DISCONTINUED | OUTPATIENT
Start: 2019-05-02 | End: 2019-05-02

## 2019-05-02 RX ORDER — VANCOMYCIN HCL 1 G
1000 VIAL (EA) INTRAVENOUS ONCE
Qty: 0 | Refills: 0 | Status: COMPLETED | OUTPATIENT
Start: 2019-05-02 | End: 2019-05-02

## 2019-05-02 RX ORDER — LEVETIRACETAM 250 MG/1
1000 TABLET, FILM COATED ORAL ONCE
Qty: 0 | Refills: 0 | Status: COMPLETED | OUTPATIENT
Start: 2019-05-02 | End: 2019-05-02

## 2019-05-02 RX ORDER — LEVETIRACETAM 250 MG/1
500 TABLET, FILM COATED ORAL EVERY 12 HOURS
Qty: 0 | Refills: 0 | Status: DISCONTINUED | OUTPATIENT
Start: 2019-05-02 | End: 2019-05-03

## 2019-05-02 RX ORDER — ACETAMINOPHEN 500 MG
650 TABLET ORAL ONCE
Qty: 0 | Refills: 0 | Status: COMPLETED | OUTPATIENT
Start: 2019-05-02 | End: 2019-05-02

## 2019-05-02 RX ORDER — SODIUM CHLORIDE 9 MG/ML
3000 INJECTION, SOLUTION INTRAVENOUS ONCE
Qty: 0 | Refills: 0 | Status: COMPLETED | OUTPATIENT
Start: 2019-05-02 | End: 2019-05-02

## 2019-05-02 RX ADMIN — Medication 1 MILLIGRAM(S): at 23:40

## 2019-05-02 RX ADMIN — LEVETIRACETAM 400 MILLIGRAM(S): 250 TABLET, FILM COATED ORAL at 13:55

## 2019-05-02 RX ADMIN — FENTANYL CITRATE 25 MICROGRAM(S): 50 INJECTION INTRAVENOUS at 19:53

## 2019-05-02 RX ADMIN — MORPHINE SULFATE 4 MILLIGRAM(S): 50 CAPSULE, EXTENDED RELEASE ORAL at 23:36

## 2019-05-02 RX ADMIN — MORPHINE SULFATE 4 MILLIGRAM(S): 50 CAPSULE, EXTENDED RELEASE ORAL at 23:06

## 2019-05-02 RX ADMIN — PIPERACILLIN AND TAZOBACTAM 3.38 GRAM(S): 4; .5 INJECTION, POWDER, LYOPHILIZED, FOR SOLUTION INTRAVENOUS at 13:43

## 2019-05-02 RX ADMIN — PIPERACILLIN AND TAZOBACTAM 200 GRAM(S): 4; .5 INJECTION, POWDER, LYOPHILIZED, FOR SOLUTION INTRAVENOUS at 10:34

## 2019-05-02 RX ADMIN — Medication 650 MILLIGRAM(S): at 10:33

## 2019-05-02 RX ADMIN — MORPHINE SULFATE 4 MG/HR: 50 CAPSULE, EXTENDED RELEASE ORAL at 23:47

## 2019-05-02 RX ADMIN — FENTANYL CITRATE 25 MICROGRAM(S): 50 INJECTION INTRAVENOUS at 19:23

## 2019-05-02 RX ADMIN — Medication 10 MILLIGRAM(S): at 15:26

## 2019-05-02 RX ADMIN — Medication 1000 MILLIGRAM(S): at 13:43

## 2019-05-02 RX ADMIN — Medication 4 MILLIGRAM(S): at 23:06

## 2019-05-02 RX ADMIN — SODIUM CHLORIDE 3000 MILLILITER(S): 9 INJECTION, SOLUTION INTRAVENOUS at 13:43

## 2019-05-02 RX ADMIN — Medication 250 MILLIGRAM(S): at 11:31

## 2019-05-02 RX ADMIN — MORPHINE SULFATE 2 MG/HR: 50 CAPSULE, EXTENDED RELEASE ORAL at 23:17

## 2019-05-02 RX ADMIN — Medication 0.5 MILLIGRAM(S): at 19:21

## 2019-05-02 RX ADMIN — MORPHINE SULFATE 2 MILLIGRAM(S): 50 CAPSULE, EXTENDED RELEASE ORAL at 21:07

## 2019-05-02 RX ADMIN — Medication 4 MILLIGRAM(S): at 21:21

## 2019-05-02 RX ADMIN — Medication 650 MILLIGRAM(S): at 13:43

## 2019-05-02 RX ADMIN — SODIUM CHLORIDE 3000 MILLILITER(S): 9 INJECTION, SOLUTION INTRAVENOUS at 10:34

## 2019-05-02 RX ADMIN — MORPHINE SULFATE 2 MILLIGRAM(S): 50 CAPSULE, EXTENDED RELEASE ORAL at 21:37

## 2019-05-02 RX ADMIN — LEVETIRACETAM 420 MILLIGRAM(S): 250 TABLET, FILM COATED ORAL at 22:37

## 2019-05-02 NOTE — PROGRESS NOTE ADULT - PROBLEM SELECTOR PLAN 1
Due to new brain METS from primary renal cell carcinoma  Decadron, Keppra for seizure prophylaxis  Hold anticoagulation/antiplatelet therapy  s/p IVC filter

## 2019-05-02 NOTE — H&P ADULT - ASSESSMENT
Patient is a 63 year old female with PMHx of Renal Cell Carcinoma (diagnosed 3/17) s/p right kidney cryoablation (3/17) currently on OpDivo for past 3 weeks and with known mets to pelvis, thoracic/lumbar spine, lungs, and liver, now with new hemorrhagic metastatic lesion of brain on head CT today and anterior basilar segmental pulmonary embolism of RLL. Patient is a 63 year old female with PMHx of Renal Cell Carcinoma (diagnosed 3/17) s/p right kidney cryoablation (3/17) currently on OpDivo for past 3 weeks and with known mets to pelvis, thoracic/lumbar spine, lungs, and liver, now with altered mental status secondary to new multifocal hemorrhagic metastatic brain lesions with vasogenic edema and pulmonary embolus.        CRITICAL CARE TIME SPENT: 60 minutes assessing presenting problems of acute illness, which pose high probability of life threatening deterioration or end organ damage/dysfunction, as well as medical decision making including initiating plan of care, reviewing data, reviewing radiologic exams, discussing with multidisciplinary team, non-inclusive of procedures performed, discussing goals of care with patient/family. At this time, patient is a full code.

## 2019-05-02 NOTE — PROGRESS NOTE ADULT - SUBJECTIVE AND OBJECTIVE BOX
Patient is a 63y old  Female who presents with a chief complaint of New METs to brain, PE, AMS (02 May 2019 16:30)      BRIEF HOSPITAL COURSE: ***    Events last 24 hours: ***    PAST MEDICAL & SURGICAL HISTORY:  Kidney mass  Asthma  Hyperlipidemia  Hypertension  COPD (chronic obstructive pulmonary disease)  Obstructive sleep apnea: does not use CPAP machine  Mitral valve disorder  Aortic stenosis  Diabetes mellitus  Status post cryoablation: Of R kidney mass 3/7/17  S/P MVR (mitral valve replacement)  S/P AVR  S/P cholecystectomy  S/P tonsillectomy  History of  section: ,         Medications:        ALPRAZolam 0.25 milliGRAM(s) Oral three times a day PRN  fentaNYL    Injectable 25 MICROGram(s) IV Push once                  chlorhexidine 2% Cloths 1 Application(s) Topical daily            ICU Vital Signs Last 24 Hrs  T(C): 36.7 (02 May 2019 17:49), Max: 37.8 (02 May 2019 10:33)  T(F): 98.1 (02 May 2019 17:49), Max: 100 (02 May 2019 10:33)  HR: 110 (02 May 2019 17:49) (94 - 111)  BP: 142/65 (02 May 2019 17:49) (113/56 - 167/85)  BP(mean): 93 (02 May 2019 17:49) (93 - 93)  ABP: --  ABP(mean): --  RR: 24 (02 May 2019 17:49) (17 - 35)  SpO2: 90% (02 May 2019 17:49) (90% - 96%)          I&O's Detail        LABS:                        10.9   10.8  )-----------( 241      ( 02 May 2019 10:13 )             34.8     05-02    140  |  104  |  20.0  ----------------------------<  159<H>  5.0   |  22.0  |  0.92    Ca    9.6      02 May 2019 10:13    TPro  8.1  /  Alb  3.7  /  TBili  0.6  /  DBili  x   /  AST  24  /  ALT  13  /  AlkPhos  101  05-02          CAPILLARY BLOOD GLUCOSE        PT/INR - ( 02 May 2019 10:13 )   PT: 14.2 sec;   INR: 1.23 ratio         PTT - ( 02 May 2019 10:13 )  PTT:29.4 sec  Urinalysis Basic - ( 02 May 2019 11:59 )    Color: Yellow / Appearance: Clear / S.015 / pH: x  Gluc: x / Ketone: Small  / Bili: Negative / Urobili: Negative mg/dL   Blood: x / Protein: 30 mg/dL / Nitrite: Negative   Leuk Esterase: Negative / RBC: Negative /HPF / WBC 0-2   Sq Epi: x / Non Sq Epi: Occasional / Bacteria: Negative      CULTURES:      Physical Examination:    General: No acute distress.      HEENT: Pupils equal, reactive to light.  Symmetric.    PULM: Clear to auscultation bilaterally, no significant sputum production    NECK: Supple, no lymphadenopathy, trachea midline    CVS: Regular rate and rhythm, no murmurs, rubs, or gallops    GI: Soft, nondistended, nontender, normoactive bowel sounds, no masses    EXT: No edema, nontender    SKIN: Warm and well perfused, no rashes noted.    NEURO: Alert, oriented, interactive, nonfocal    DEVICES:     RADIOLOGY: ***    CRITICAL CARE TIME SPENT: *** Patient is a 63y old  Female who presents with a chief complaint of New METs to brain, PE, AMS (02 May 2019 16:30)      BRIEF HOSPITAL COURSE: 63 year old female with PMHx HTN, ARIK, HLD, COPD, Mitral valve and Aortic valve replacement (), DM, and Renal Cell Carcinoma (diagnosed 3/17) s/p right kidney cryoablation (3/17) currently on OpDivo for past 3 weeks and with known mets to pelvis, thoracic/lumbar spine, lungs, and liver, presents with 3 week history of lethargy, dizziness, h/a, vomiting (1 episode of hematemesis this morning as per daughter) and AMS. Pt's daughter endorsed that pt's symptoms have been ongoing for 3 weeks, with intermittent episodes of 1-2 days of confusion followed by 3-4 days of clear sensorium, but has significantly worsened the past week. During this time pt experiences uncontrollable shaking and dizziness, especially when standing up from sitting/laying position (daughter is adamant that pt does not have feeling of passing out), h/a unrelieved by tylenol, nausea, vomiting and AMS. As per daughter, pt had worst week last week, describing episodes of confusion and even hallucinations, and without being able to "get up out of bed all day". Daughter states these symptoms have started when OpDivo regimen began, and endorses following chemo regimen did not produce similar episodes/symptoms (daughter unable to recall name of chemo drug). Daughter states past 2 days have been particularly difficult for pt and has not been able to function at her baseline (daughter endorses baseline being able to take care of herself, cook, clean). Also describes episode vomiting of small amounts of dark red blood earlier this morning (/), pt has not vomited since. Denies SOB, flank pain, changes in vision, chest pain, abdominal pain, weakness, and paresthesias. Of note: CT Head/chest done in ED, shows new hemorrhagic brain mets and segmental PE. Pt not currently taking any anticoagulation/antiplatelet therapy.     Events last 24 hours: Pt had IVC filter placed by IR this afternoon- upon arrival to MICU pt distressed, hypoxic, anxious asking for anxiety meds     PAST MEDICAL & SURGICAL HISTORY:  Kidney mass  Asthma  Hyperlipidemia  Hypertension  COPD (chronic obstructive pulmonary disease)  Obstructive sleep apnea: does not use CPAP machine  Mitral valve disorder  Aortic stenosis  Diabetes mellitus  Status post cryoablation: Of R kidney mass 3/7/17  S/P MVR (mitral valve replacement)  S/P AVR  S/P cholecystectomy  S/P tonsillectomy  History of  section: ,         Medications:        ALPRAZolam 0.25 milliGRAM(s) Oral three times a day PRN  fentaNYL    Injectable 25 MICROGram(s) IV Push once                  chlorhexidine 2% Cloths 1 Application(s) Topical daily            ICU Vital Signs Last 24 Hrs  T(C): 36.7 (02 May 2019 17:49), Max: 37.8 (02 May 2019 10:33)  T(F): 98.1 (02 May 2019 17:49), Max: 100 (02 May 2019 10:33)  HR: 110 (02 May 2019 17:49) (94 - 111)  BP: 142/65 (02 May 2019 17:49) (113/56 - 167/85)  BP(mean): 93 (02 May 2019 17:49) (93 - 93)  ABP: --  ABP(mean): --  RR: 24 (02 May 2019 17:49) (17 - 35)  SpO2: 90% (02 May 2019 17:49) (90% - 96%)          I&O's Detail        LABS:                        10.9   10.8  )-----------( 241      ( 02 May 2019 10:13 )             34.8     05-02    140  |  104  |  20.0  ----------------------------<  159<H>  5.0   |  22.0  |  0.92    Ca    9.6      02 May 2019 10:13    TPro  8.1  /  Alb  3.7  /  TBili  0.6  /  DBili  x   /  AST  24  /  ALT  13  /  AlkPhos  101  05-02          CAPILLARY BLOOD GLUCOSE        PT/INR - ( 02 May 2019 10:13 )   PT: 14.2 sec;   INR: 1.23 ratio         PTT - ( 02 May 2019 10:13 )  PTT:29.4 sec  Urinalysis Basic - ( 02 May 2019 11:59 )    Color: Yellow / Appearance: Clear / S.015 / pH: x  Gluc: x / Ketone: Small  / Bili: Negative / Urobili: Negative mg/dL   Blood: x / Protein: 30 mg/dL / Nitrite: Negative   Leuk Esterase: Negative / RBC: Negative /HPF / WBC 0-2   Sq Epi: x / Non Sq Epi: Occasional / Bacteria: Negative      CULTURES:      Physical Examination:    General: moderate acute resp distress.      HEENT: Pupils equal, reactive to light.  Symmetric.    PULM: Coarse to auscultation bilaterally, no significant sputum production    NECK: Supple, no lymphadenopathy, trachea midline    CVS: tachy irregular     GI: Soft, distended, nontender, normoactive bowel sound    EXT: trace edema, nontender    SKIN: Warm and well perfused, no rashes noted.    NEURO: Alert, oriented, interactive, nonfocal    DEVICES:     RADIOLOGY: ***    CRITICAL CARE TIME SPENT: *** Patient is a 63y old  Female who presents with a chief complaint of New METs to brain, PE, AMS (02 May 2019 16:30)      BRIEF HOSPITAL COURSE: 63 year old female with PMHx HTN, ARIK, HLD, COPD, Mitral valve and Aortic valve replacement (), DM, and Renal Cell Carcinoma (diagnosed 3/17) s/p right kidney cryoablation (3/17) currently on OpDivo for past 3 weeks and with known mets to pelvis, thoracic/lumbar spine, lungs, and liver, presents with 3 week history of lethargy, dizziness, h/a, vomiting (1 episode of hematemesis this morning as per daughter) and AMS. Pt's daughter endorsed that pt's symptoms have been ongoing for 3 weeks, with intermittent episodes of 1-2 days of confusion followed by 3-4 days of clear sensorium, but has significantly worsened the past week. During this time pt experiences uncontrollable shaking and dizziness, especially when standing up from sitting/laying position (daughter is adamant that pt does not have feeling of passing out), h/a unrelieved by tylenol, nausea, vomiting and AMS. As per daughter, pt had worst week last week, describing episodes of confusion and even hallucinations, and without being able to "get up out of bed all day". Daughter states these symptoms have started when OpDivo regimen began, and endorses following chemo regimen did not produce similar episodes/symptoms (daughter unable to recall name of chemo drug). Daughter states past 2 days have been particularly difficult for pt and has not been able to function at her baseline (daughter endorses baseline being able to take care of herself, cook, clean). Also describes episode vomiting of small amounts of dark red blood earlier this morning (/), pt has not vomited since. Denies SOB, flank pain, changes in vision, chest pain, abdominal pain, weakness, and paresthesias. Of note: CT Head/chest done in ED, shows new hemorrhagic brain mets and segmental PE. Pt not currently taking any anticoagulation/antiplatelet therapy.     Events last 24 hours: Pt had IVC filter placed by IR this afternoon- upon arrival to MICU pt distressed, hypoxic, anxious asking for anxiety meds     PAST MEDICAL & SURGICAL HISTORY:  Kidney mass  Asthma  Hyperlipidemia  Hypertension  COPD (chronic obstructive pulmonary disease)  Obstructive sleep apnea: does not use CPAP machine  Mitral valve disorder  Aortic stenosis  Diabetes mellitus  Status post cryoablation: Of R kidney mass 3/7/17  S/P MVR (mitral valve replacement)  S/P AVR  S/P cholecystectomy  S/P tonsillectomy  History of  section: ,         Medications:        ALPRAZolam 0.25 milliGRAM(s) Oral three times a day PRN  fentaNYL    Injectable 25 MICROGram(s) IV Push once                  chlorhexidine 2% Cloths 1 Application(s) Topical daily            ICU Vital Signs Last 24 Hrs  T(C): 36.7 (02 May 2019 17:49), Max: 37.8 (02 May 2019 10:33)  T(F): 98.1 (02 May 2019 17:49), Max: 100 (02 May 2019 10:33)  HR: 110 (02 May 2019 17:49) (94 - 111)  BP: 142/65 (02 May 2019 17:49) (113/56 - 167/85)  BP(mean): 93 (02 May 2019 17:49) (93 - 93)  ABP: --  ABP(mean): --  RR: 24 (02 May 2019 17:49) (17 - 35)  SpO2: 90% (02 May 2019 17:49) (90% - 96%)          I&O's Detail        LABS:                        10.9   10.8  )-----------( 241      ( 02 May 2019 10:13 )             34.8     05-02    140  |  104  |  20.0  ----------------------------<  159<H>  5.0   |  22.0  |  0.92    Ca    9.6      02 May 2019 10:13    TPro  8.1  /  Alb  3.7  /  TBili  0.6  /  DBili  x   /  AST  24  /  ALT  13  /  AlkPhos  101  05-02          CAPILLARY BLOOD GLUCOSE        PT/INR - ( 02 May 2019 10:13 )   PT: 14.2 sec;   INR: 1.23 ratio         PTT - ( 02 May 2019 10:13 )  PTT:29.4 sec  Urinalysis Basic - ( 02 May 2019 11:59 )    Color: Yellow / Appearance: Clear / S.015 / pH: x  Gluc: x / Ketone: Small  / Bili: Negative / Urobili: Negative mg/dL   Blood: x / Protein: 30 mg/dL / Nitrite: Negative   Leuk Esterase: Negative / RBC: Negative /HPF / WBC 0-2   Sq Epi: x / Non Sq Epi: Occasional / Bacteria: Negative      CULTURES:      Physical Examination:    General: moderate acute resp distress.      HEENT: Pupils equal, reactive to light.  Symmetric. perioral cyanosis    PULM: Coarse to auscultation bilaterally, no significant sputum production    NECK: Supple, no lymphadenopathy, trachea midline    CVS: tachy irregular     GI: Soft, distended, nontender, normoactive bowel sound    EXT: trace edema, nontender    SKIN: Warm and well perfused, no rashes noted. mottled legs    NEURO: Alert, oriented, interactive, nonfocal    DEVICES:     RADIOLOGY:   < from: US Duplex Venous Lower Ext Complete, Bilateral (19 @ 15:32) >  FINDINGS:   The bilateral common femoral, femoral, and popliteal veins are normally   compressible with normal spontaneous and phasic flow. Portions of the   posterior tibial and peroneal trunk veins where visualized demonstrate   normal waveforms.    IMPRESSION:   No evidence of acute femoropopliteal deep venous thrombosis bilaterally.      If clinical concern for deep vein thrombosis persists, follow up   sonography is recommended in 5 days to 1 week.                    VY BALL M.D., ATTENDING RADIOLOGIST  This document has been electronically signed. May  2 2019  3:43PM        < end of copied text >  < from: CT Angio Chest w/ IV Cont (19 @ 12:25) >   EXAM:  CT ANGIO CHEST (W)AW IC                          PROCEDURE DATE:  2019          INTERPRETATION:  CLINICAL INFORMATION: Hepatic cancer. Evaluate for   pulmonary embolism.    COMPARISON: CT chest/abdomen/pelvis 2019 and thoracic spine MRI   3/18/2019 and lumbar spine MRI 3/16/2019    PROCEDURE:   CT Angiography of the Chest.  58 ml of Omnipaque 350 was injected intravenously.   Sagittal and coronal reformats were performed as well as 3D (MIP)   reconstructions.      FINDINGS:    CHEST:     LUNGS AND LARGE AIRWAYS: Innumerable bilateral pulmonary nodules which   have increased in size and number since 2019 consistent with   metastatic disease. A representative left upper lobe nodule measures 1.8   x 1.9 cm (series 5 image34), previously 1.1 x 0.9 cm. Calcified   granulomas are also noted. Central airways are patent. Mild peribronchial   thickening.  PLEURA: No pleural effusion.  VESSELS: Evaluation of the pulmonary arteries demonstrates a subsegmental   pulmonary involving a segmental branch to the anterior basilar segment of   the right lower lobe with extension into subsegmental branches (series 6   image 292). Thoracic aorta normal in caliber with mild calcified plaque.  HEART: Enlarged. Mitral and aortic valve replacements. No pericardial   effusion.  MEDIASTINUM AND ADRYAN: Interval development of mediastinal and bilateral   hilar lymphadenopathy, likely metastatic disease. A representative   pretracheal lymph node measures 2.5 x 2.0 cm (series 5 image 57). A   region of the left hilar lymphadenopathy measures measuring 4.7 x 1.9 cm   (series 5 image 57), either a single or conglomerate of lymph nodes.  CHEST WALL AND LOWER NECK: No enlarged axillary lymph nodes.  VISUALIZED UPPER ABDOMEN: Partially imaged mass at the posterior aspect   of the left kidney measuring 5.0 x 3.2 cm, increased in size since   2019, previously 3.6 x 2.5 cm, likely metastatic disease. Stable 2.1   cm lesion in the mid left kidney. Status post cholecystectomy. Partially   imaged enhancing lesion in the right hepatic lobe as seen on the prior   CT. Stable right adrenal nodule.  BONES: Status post sternotomy. Old fracture of the lateral aspect of the   right ninth rib. Osseous metastasis the prior MRIs are not well seen on   CT.    IMPRESSION:     Pulmonary embolism involving a segmental branch to the anterior basilar   segment of the right lower lobe with extension into subsegmental branches.    Interval progression of metastatic disease including an interval increase   in size and number of innumerable pulmonary nodules, interval development   of mediastinal and bilateral hilar lymphadenopathy, and interval increase   in size of a partially imaged mass at the posterior aspect of the left   kidney.    Osseous metastatic disease on the prior thoracic and lumbar spine MRIs   3/18/2019 and 3/16/2019 are not well seen on CT.    The findings were discussed with Dr. Romero at 12:53 PM on 2019.    < end of copied text >  < from: CT Head No Cont (19 @ 12:21) >    FINDINGS:  9 mm hyperdense/hemorrhagic metastases in the right frontal lobe on image   30, series with moderate adjacent edema. Second 4 mm hyperdense process   is seen in right inferior cerebellum on image 9, series 2 mild adjacent   edema. 2 mm hyperdense metastasis in the high right frontal lobe in image   39, series 2 mild adjacent edema.     There is no compelling evidence for an acute transcortical infarction.   There is no other evidence of additional mass, mass effect, midline shift   or extra-axial fluid collection. The lateral ventricles and cortical   sulci are otherwise age-appropriate in size and configuration. The   orbits, mastoid air cells and visualized paranasal sinuses are   unremarkable. The calvarium is intact.    IMPRESSION: Multiple intracranial hyperdense/hemorrhagic metastasis.   Findings discussed with Dr. Romero.                ALBA RODRÍGUEZ M.D., ATTENDING RADIOLOGIST  This document has been electronically signed. May  2 2019 12:40PM        < end of copied text >    CRITICAL CARE TIME SPENT: 45 minutes

## 2019-05-02 NOTE — PROGRESS NOTE ADULT - PROBLEM SELECTOR PLAN 3
s/p IVC filter  worsening respiratory status  pt given fentanyl IVP and Ativan appears to be more calm but still working to breathe  attempted to elicit her wishes from her but she became acutely more anxious

## 2019-05-02 NOTE — ED PROVIDER NOTE - PROGRESS NOTE DETAILS
d/w rads, small hemorrhagic metastatic lesion right frontal lobe of brain; one subsegmental PE on CT chest; cannot anticoagulate; d/w MICU will admit, dr mobley contacted

## 2019-05-02 NOTE — H&P ADULT - ATTENDING COMMENTS
64 yo female with metastatic renal cell carcinoma on immunotherapy, presents with hemorrhagic brain met, vasogenic cerebral edema, PE.  Plan is for IVC filter given inability to anticoagulate, decadronreneeppra.

## 2019-05-02 NOTE — H&P ADULT - NSHPSOCIALHISTORY_GEN_ALL_CORE
Previous smoker, hasn't smoked for several years, baseline independent, lives at home with daughter who is healthcare proxy. Retired . Denies alcohol and illicit drug use.

## 2019-05-02 NOTE — H&P ADULT - PROBLEM SELECTOR PLAN 3
O2 sat 91 on room air. No additional O2 therapy needed at this time, monitor and additional O2 therapy as needed for respiratory distress.  Hold anticoagulation given current brain bleed  Vascular consult for IVC filter Unable to anticoagulate due to acute ICH / brain mets  Due to active cancer, at high risk for worsening clot burden  Will consult IR for IVC filter placement

## 2019-05-02 NOTE — ED ADULT NURSE NOTE - NSIMPLEMENTINTERV_GEN_ALL_ED
Implemented All Universal Safety Interventions:  [place] to call system. Call bell, personal items and telephone within reach. Instruct patient to call for assistance. Room bathroom lighting operational. Non-slip footwear when patient is off stretcher. Physically safe environment: no spills, clutter or unnecessary equipment. Stretcher in lowest position, wheels locked, appropriate side rails in place.

## 2019-05-02 NOTE — CONSULT NOTE ADULT - ASSESSMENT
· Assessment		  Widespread metastatic clear cell renal cell carcinoma to lung, bone, S/P targeted and immunotherapy (cabozantinib, pazopanib, nivolumab, ipilibumab), now with symptomatic multiple hemorrhagic brain mets, pulmonary embolus. Overall prognosis very  poor .   Compromised performance status (KPS<40) and progression of malignant disease despite multiple systemic agents and courses of palliative radiotherapy    PLAN  1) No further systemic therapy is available per medical oncology; agree with   implementation of DNR status. A resusitative effort if patient coded  would most likely be futile and cruel; this was made very clear to the family who appeared to understand . Palliative care consultation advised. Comfort care only strongly advised.  2) Given poor KPS , multiple hemorrhagic brain metastases progression of metastatic malignant disease despite  maximal oncologic therapy, there would be no benefit to palliative brain irradiation which would likely further diminish this patient's already compromised quality of life.  3) Agree with [place] filter since anticoagulation not feasible in setting of hemorrhagic brain metastases.

## 2019-05-02 NOTE — H&P ADULT - PROBLEM SELECTOR PLAN 1
Due to new brain METS from primary renal cell carcinoma  Neuro following  Decadron given in ED, Keppra for seizure prophylaxis  Repeat head CT in 6 hours to ensure no worsening of hemorrhage   Neuro following  heme/onc consult  Hold anticoagulation/antiplatelet therapy  vascular consult for IVC filter Due to new brain METS from primary renal cell carcinoma  Admit to ICU with q1h neuro checks, STAT CT head for any acute changes in neurologic exam  Neurosurgery consulted   Continue steroids and Keppra for seizure prophylaxis  Repeat head CT in 6 hours to eval stability of hemorrhage   Neurology following  heme/onc consult- Dr. Wise   Hold anticoagulation/antiplatelet therapy

## 2019-05-02 NOTE — CONSULT NOTE ADULT - SUBJECTIVE AND OBJECTIVE BOX
HPI:  Patient is a 63 year old female with PMHx HTN, ARIK, HLD, COPD, Mitral valve and Aortic valve replacement (), DM, and Renal Cell Carcinoma (diagnosed 3/17) s/p right kidney cryoablation (3/17) currently on OpDivo for past 3 weeks and with known mets to pelvis, thoracic/lumbar spine, lungs, and liver, presents with 3 week history of lethargy, dizziness, h/a, vomiting (1 episode of hematemesis this morning as per daughter) and AMS. Pt's daughter endorsed that pt's symptoms have been ongoing for 3 weeks, with intermittent episodes of 1-2 days of confusion followed by 3-4 days of clear sensorium, but has significantly worsened the past week. During this time pt experiences uncontrollable shaking and dizziness, especially when standing up from sitting/laying position (daughter is adamant that pt does not have feeling of passing out), h/a unrelieved by tylenol, nausea, vomiting and AMS. As per daughter, pt had worst week last week, describing episodes of confusion and even hallucinations, and without being able to "get up out of bed all day". Daughter states these symptoms have started when OpDivo regimen began, and endorses following chemo regimen did not produce similar episodes/symptoms (daughter unable to recall name of chemo drug). Daughter states past 2 days have been particularly difficult for pt and has not been able to function at her baseline (daughter endorses baseline being able to take care of herself, cook, clean). Also describes episode vomiting of small amounts of dark red blood earlier this morning (5/), pt has not vomited since. Denies SOB, flank pain, changes in vision, chest pain, abdominal pain, weakness, and paresthesias. Of note: CT Head/chest done in ED, shows new hemorrhagic brain mets and segmental PE. Pt not currently taking any anticoagulation/antiplatelet therapy. (02 May 2019 13:35)    Cary is followed by Dr. Wise, and myself  at [facility] for a history of recurrent renal cell carcinoma. History dates to 2017 when she underwent percutaneous cryoablation for bilateral clear cell renal cell carcinoma. She had initially presented with a right renal mass and left renal mass. In March and May of 2017, respectively. The first followup CT scan in 2017 showed no residual. In 2018 MRI revealed a posterior left interpolar lesion. Repeat cryoablation of the left renal lesion was considered.     The pain progressively worsened and in 2018 a new 4 cm lytic lesion in the inferior right pubic ramus came evident that region received palliative radiation in May 2018 and repeated to the same and adjacent area a few months later with little palliative effect ( 2000cGyin 5 fx each time . Her latest course of palliative RT , 2000cGy  in 5 fx to lower spine was delivered one month ago with modest palliation .     Systemic therapy with cabozantinib was used from May 2018 to 2018, but held because of hand-foot syndrome. Pazopanib was attempted and also discontinued for the same reason. Nivolumab immunotherapy began in late 2018. On 2019 ipilibumab was added because of progressive disease in the bone kidney and lung.  Patient is now admitted with symptomatic hemorrhagic brain metastases, progressive lung and bone mets, and evidence of segmental PE.    REVIEW OF SYSTEMS:    CONSTITUTIONAL:  fatigue  BREASTS: No pain, masses, or nipple discharge  RESPIRATORY: No cough, wheezing, chills or hemoptysis; No shortness of breath  CARDIOVASCULAR: No chest pain, palpitations, dizziness, or leg swelling  GASTROINTESTINAL: hematemesis  GENITOURINARY: No dysuria, frequency, hematuria, or incontinence  NEUROLOGICAL: lethargy, dizziness  MUSCULOSKELETAL: No joint pain or swelling; No muscle, back, or extremity pain  HEME/LYMPH: No easy bruising, or bleeding gums      PAST MEDICAL & SURGICAL HISTORY:  Kidney mass  Asthma  Hyperlipidemia  Hypertension  COPD (chronic obstructive pulmonary disease)  Obstructive sleep apnea: does not use CPAP machine  Mitral valve disorder  Aortic stenosis  Diabetes mellitus  Status post cryoablation: Of R kidney mass 3/7/17  S/P MVR (mitral valve replacement)  S/P AVR  S/P cholecystectomy  S/P tonsillectomy  History of  section: ,       SOCIAL HISTORY:    FAMILY HISTORY:  Family history of cirrhosis of liver  Family history of hypertension (Sibling)    Allergies    No Known Allergies    Intolerances    MEDICATIONS  (PRN):  ALPRAZolam 0.25 milliGRAM(s) Oral three times a day PRN  fentaNYL    Injectable 25 MICROGram(s) IV Push once  chlorhexidine 2% Cloths 1 Application(s) Topical daily    Vital Signs Last 24 Hrs  T(C): 36.7 (02 May 2019 14:47), Max: 37.8 (02 May 2019 10:33)  T(F): 98.1 (02 May 2019 14:47), Max: 100 (02 May 2019 10:33)  HR: 94 (02 May 2019 14:47) (94 - 111)  BP: 113/56 (02 May 2019 14:47) (113/56 - 167/85)  BP(mean): --  RR: 17 (02 May 2019 14:47) (17 - 35)  SpO2: 96% (02 May 2019 14:47) (95% - 96%)    PHYSICAL EXAM:    GENERAL: ill-appearing  EYES: EOMI, PERRLA, conjunctiva and sclera clear  ENMT: No tonsillar erythema, exudates, or enlargement; Moist mucous membranes, Good dentition, ; perioral cyanosis  NECK: Supple, No JVD, Normal thyroid  NERVOUS SYSTEM:  somulent (post ativan and Fentanyl) ,  sensorimotor exam unreliable.   DTRs 2+ intact and symmetric  CHEST/LUNG:   ;O2 face mask. somewhat labored and irregular breathing  HEART: rapid irregular rhythm and rate  ABDOMEN: Soft, Nontender, Nondistended; Bowel sounds present  EXTREMITIES:  2+ Peripheral Pulses, No clubbing, cyanosis,  minimal bipedal  edema  LYMPH: No lymphadenopathy noted  SKIN: No rashes or lesions    LABS:                        10.9   10.8  )-----------( 241      ( 02 May 2019 10:13 )             34.8     140  |  104  |  20.0  ----------------------------<  159<H>  5.0   |  22.0  |  0.92    Ca    9.6      02 May 2019 10:13    TPro  8.1  /  Alb  3.7  /  TBili  0.6  /  DBili  x   /  AST  24  /  ALT  13  /  AlkPhos  101  05-02    PT/INR - ( 02 May 2019 10:13 )   PT: 14.2 sec;   INR: 1.23 ratio         PTT - ( 02 May 2019 10:13 )  PTT:29.4 sec    RADIOLOGY:   < from: US Duplex Venous Lower Ext Complete, Bilateral (19 @ 15:32) >  FINDINGS:   The bilateral common femoral, femoral, and popliteal veins are normally   compressible with normal spontaneous and phasic flow. Portions of the   posterior tibial and peroneal trunk veins where visualized demonstrate   normal waveforms.    IMPRESSION:   No evidence of acute femoropopliteal deep venous thrombosis bilaterally.      If clinical concern for deep vein thrombosis persists, follow up   sonography is recommended in 5 days to 1 week.  VY BALL M.D., ATTENDING RADIOLOGIST  This document has been electronically signed. May  2 2019  3:43PM        < end of copied text >  < from: CT Angio Chest w/ IV Cont (19 @ 12:25) >   EXAM:  CT ANGIO CHEST (W)AW IC                          PROCEDURE DATE:  2019      INTERPRETATION:  CLINICAL INFORMATION: Hepatic cancer. Evaluate for   pulmonary embolism.    COMPARISON: CT chest/abdomen/pelvis 2019 and thoracic spine MRI   3/18/2019 and lumbar spine MRI 3/16/2019    PROCEDURE:   CT Angiography of the Chest.  58 ml of Omnipaque 350 was injected intravenously.   Sagittal and coronal reformats were performed as well as 3D (MIP)   reconstructions.      FINDINGS:    CHEST:     LUNGS AND LARGE AIRWAYS: Innumerable bilateral pulmonary nodules which   have increased in size and number since 2019 consistent with   metastatic disease. A representative left upper lobe nodule measures 1.8   x 1.9 cm (series 5 image34), previously 1.1 x 0.9 cm. Calcified   granulomas are also noted. Central airways are patent. Mild peribronchial   thickening.  PLEURA: No pleural effusion.  VESSELS: Evaluation of the pulmonary arteries demonstrates a subsegmental   pulmonary involving a segmental branch to the anterior basilar segment of   the right lower lobe with extension into subsegmental branches (series 6   image 292). Thoracic aorta normal in caliber with mild calcified plaque.  HEART: Enlarged. Mitral and aortic valve replacements. No pericardial   effusion.  MEDIASTINUM AND ADRYAN: Interval development of mediastinal and bilateral   hilar lymphadenopathy, likely metastatic disease. A representative   pretracheal lymph node measures 2.5 x 2.0 cm (series 5 image 57). A   region of the left hilar lymphadenopathy measures measuring 4.7 x 1.9 cm   (series 5 image 57), either a single or conglomerate of lymph nodes.  CHEST WALL AND LOWER NECK: No enlarged axillary lymph nodes.  VISUALIZED UPPER ABDOMEN: Partially imaged mass at the posterior aspect   of the left kidney measuring 5.0 x 3.2 cm, increased in size since   2019, previously 3.6 x 2.5 cm, likely metastatic disease. Stable 2.1   cm lesion in the mid left kidney. Status post cholecystectomy. Partially   imaged enhancing lesion in the right hepatic lobe as seen on the prior   CT. Stable right adrenal nodule.  BONES: Status post sternotomy. Old fracture of the lateral aspect of the   right ninth rib. Osseous metastasis the prior MRIs are not well seen on   CT.    IMPRESSION:     Pulmonary embolism involving a segmental branch to the anterior basilar   segment of the right lower lobe with extension into subsegmental branches.    Interval progression of metastatic disease including an interval increase   in size and number of innumerable pulmonary nodules, interval development   of mediastinal and bilateral hilar lymphadenopathy, and interval increase   in size of a partially imaged mass at the posterior aspect of the left   kidney.    Osseous metastatic disease on the prior thoracic and lumbar spine MRIs   3/18/2019 and 3/16/2019 are not well seen on CT.    The findings were discussed with Dr. Romero at 12:53 PM on 2019.    < end of copied text >  < from: CT Head No Cont (19 @ 12:21) >    FINDINGS:  9 mm hyperdense/hemorrhagic metastases in the right frontal lobe on image   30, series with moderate adjacent edema. Second 4 mm hyperdense process   is seen in right inferior cerebellum on image 9, series 2 mild adjacent   edema. 2 mm hyperdense metastasis in the high right frontal lobe in image   39, series 2 mild adjacent edema.     There is no compelling evidence for an acute transcortical infarction.   There is no other evidence of additional mass, mass effect, midline shift   or extra-axial fluid collection. The lateral ventricles and cortical   sulci are otherwise age-appropriate in size and configuration. The   orbits, mastoid air cells and visualized paranasal sinuses are   unremarkable. The calvarium is intact.    IMPRESSION: Multiple intracranial hyperdense/hemorrhagic metastasis.     ALBA RODRÍGUEZ M.D., ATTENDING RADIOLOGIST  This document has been electronically signed. May  2 2019 12:40PM

## 2019-05-02 NOTE — ED CLERICAL - NS ED CLERK UNITS
[id number]

## 2019-05-02 NOTE — H&P ADULT - NSTOBACCOSCREENHP_GEN_A_NCS
Health Maintenance Summary     Topic Due On Due Status Completed On    MAMMOGRAM - BREAST CANCER SCREENING Feb 4, 2019 Not Due Feb 4, 2017    Colorectal Cancer Screening - Colonoscopy Jun 26, 2020 Not Due Jun 26, 2010    Pap Smear - Cervical Cancer Screening  Aug 7, 2019 Not Due Aug 7, 2014    Immunization - TDAP Pregnancy  Hidden     IMMUNIZATION - DTaP/Tdap/Td Dec 23, 1978 Overdue     Immunization-Influenza  Completed Sep 21, 2017          Patient is due for topics as listed above, she wishes to discuss with provider .     No

## 2019-05-02 NOTE — H&P ADULT - NSICDXPASTMEDICALHX_GEN_ALL_CORE_FT
PAST MEDICAL HISTORY:  Aortic stenosis     Asthma     COPD (chronic obstructive pulmonary disease)     Diabetes mellitus     Hyperlipidemia     Hypertension     Kidney mass     Mitral valve disorder     Obstructive sleep apnea does not use CPAP machine

## 2019-05-02 NOTE — PROGRESS NOTE ADULT - PROBLEM SELECTOR PLAN 2
Dr. Echeverria came to bedside as well as Dr. Barbosa and spoke to pt's daughter Ayaka regarding overall poor prognosis

## 2019-05-02 NOTE — CONSULT NOTE ADULT - CONSULT REASON
64 y/o woman with 2 year h/o renal cell carcinoma status post mulitple courses of systemic therapy and palliative radiotherapy, now admitted  for progressive metastatic disease with symptomatic hemorrhagic  brain metastases , lung  and bone metastases and PE.  Asked to evaluate and consider for palliative radiotherapy.

## 2019-05-02 NOTE — CONSULT NOTE ADULT - ASSESSMENT
63yF PMH COPD, HLD, HTN, ARIK, mitral valve/aortic valve replacement 2014, DM, renal cell carcinoma diagnosed May 2017 followed by Dr. Wise s/p right kidney cryoablation 3/7/17 with metastases to pelvis, thoracic/lumbar spine, lungs, and liver, s/p radiation to pelvis and thoracic spine followed by Dr. Barbosa, now brought in to ED with hemoptysis/hematemesis found with PE and new hemorrhagic brain metastases  - Known mets to lung, liver, thoracic/lumbar spine, pelvis, s/p radiation to pelvis/thoracic/lumbar spine  - Progression of pulmonary metastatic disease on CTA Chest w/ anterior basilar segment RLL pulmonary embolism  - New mets to brain on CT head today    PLAN:  - Will d/w attending  - There is no acute neurosurgical intervention warranted at this time  - Repeat CT head 6 hour to ensure stability of hemorrhage  - Keppra 500 Q12  - Can start on decadron 4 mg Q6 hour or as per oncology  - Will eventually require MRI brain w/wo contrast once medically feasible  - Hem/onc, rad/onc consults  - Would hold ACT/APT for now given acute ICH  - Supportive care/further medical management per oncology/primary team 63yF PMH COPD, HLD, HTN, ARIK, mitral valve/aortic valve replacement 2014, DM, renal cell carcinoma diagnosed May 2017 followed by Dr. Wise s/p right kidney cryoablation 3/7/17 with metastases to pelvis, thoracic/lumbar spine, lungs, and liver, s/p radiation to pelvis and thoracic spine followed by Dr. Barbosa, now brought in to ED with hemoptysis/hematemesis found with PE and new hemorrhagic brain metastases  - Known mets to lung, liver, thoracic/lumbar spine, pelvis, s/p radiation to pelvis/thoracic/lumbar spine  - Progression of pulmonary metastatic disease on CTA Chest w/ anterior basilar segment RLL pulmonary embolism  - New mets to brain on CT head today    PLAN:  - Will d/w attending  - There is no acute neurosurgical intervention warranted at this time  - Repeat CT head 6 hour to ensure stability of hemorrhage  - Keppra 500 Q12  - Can start on decadron 4 mg Q6 hour or as per oncology  - Will eventually require MRI brain w/wo contrast once medically feasible  - Hem/onc, rad/onc consults  - Would hold ACT/APT for now given acute ICH  - Consider b/l LE dopplers and vascular consult for IVC filter  - Supportive care/further medical management per oncology/primary team

## 2019-05-02 NOTE — DISEASE MANAGEMENT
[FreeTextEntry4] : metastatic renal cell carcinoma [TTNM] : 1b [NTNM] : 0 [MTNM] : 1 [de-identified] : 800 cGy [de-identified] : 2,000 cGy [de-identified] : thoracic spine

## 2019-05-02 NOTE — PROGRESS NOTE ADULT - PROBLEM SELECTOR PLAN 6
met with pt's daughter Ayaka and her niece who are just learning of how advanced the patient's disease state is and are in a state of shock, offered supports and discussed that at this point the best course of action is to treat her symptoms and maximize her comfort knowing that time is short. They agree and understand that performing CPR or placing her on lift support would be futile and only prolong the inevitable. A DNR/I order has been placed. will continue symptom control

## 2019-05-02 NOTE — CONSULT NOTE ADULT - SUBJECTIVE AND OBJECTIVE BOX
HPI: 63y Female with metastatic ca, new hemorraghic brain lesion, PE.    Allergies:   Medications (Abx/Cardiac/Anticoagulation/Blood Products)  piperacillin/tazobactam IVPB.: 200 mL/Hr IV Intermittent (05-02 @ 10:34)  vancomycin  IVPB: 250 mL/Hr IV Intermittent (05-02 @ 11:31)    Data:  162.56  95.3  T(C): 36.7  HR: 94  BP: 113/56  RR: 17  SpO2: 96%      -WBC 10.8 / HgB 10.9 / Hct 34.8 / Plt 241  -Na 140 / Cl 104 / BUN 20.0 / Glucose 159  -K 5.0 / CO2 22.0 / Cr 0.92  -ALT 13 / Alk Phos 101 / T.Bili 0.6  -INR1.23    Imaging: Prior CT and LE duplex U/S reviewed.  CT showed segmental PE.  U/S demonstrated no evidence of femoropopliteal DVT bilaterally.

## 2019-05-02 NOTE — ED ADULT NURSE REASSESSMENT NOTE - NS ED NURSE REASSESS COMMENT FT1
Late Entry: 10:30am  Abx and fluids started. pt transferrred to main ED at 11:25, report given to receiving SURI Adams

## 2019-05-02 NOTE — CONSULT NOTE ADULT - SUBJECTIVE AND OBJECTIVE BOX
HISTORY OF PRESENT ILLNESS:   63yF PMH COPD, HLD, HTN, ARIK, mitral valve/aortic valve replacement , DM, renal cell carcinoma diagnosed May 2017 followed by Dr. Wise s/p right kidney cryoablation 3/7/17 and currently on Opdivo 2 doses every 3 weeks, with known metastases to pelvis, thoracic/lumbar spine, lungs, and liver, s/p radiation to pelvis and thoracic spine followed by Dr. Barbosa. Patient presents to ED after having an episode of hot/cold sweats and coughing up/throwing up blood. Daughter states patient has been having intermittent episodes of hot/cold sweats with associated nausea that last 1-2 days and then she feels better for the last 4 weeks. During these episodes patient becomes confused and appears to have hallucinations. Last week, patient had her worst "episode," starting on Friday where she again had hold/cold sweats through  where she laid down all day and didn't. Today she started having another episode and had 2 episodes of hemoptysis/hematemesis around 06:45 AM and was brought to ED. Patient also noted to have 2 episodes of "her whole body shaking uncontrollably for a few seconds" a few days ago. Our service consulted as CT head today shows new hemorrhagic brain metastases. Patient states she came in and had headache and chest pain but currently both have resolved. Denies weakness, paresthesias, dizziness, SOB, abdominal pain. Last took ASA 81 3 weeks ago. Denies taking other anticoagulation/antiplatelet therapy.    PAST MEDICAL & SURGICAL HISTORY:  Kidney mass  Asthma  Hyperlipidemia  Hypertension  COPD (chronic obstructive pulmonary disease)  Obstructive sleep apnea: does not use CPAP machine  Mitral valve disorder  Aortic stenosis  Diabetes mellitus  Status post cryoablation: Of R kidney mass 3/7/17  S/P MVR (mitral valve replacement)  S/P AVR  S/P cholecystectomy  S/P tonsillectomy  History of  section: ,     FAMILY HISTORY:  Family history of cirrhosis of liver  Family history of hypertension (Sibling)    SOCIAL HISTORY:  Tobacco Use: Past smoker, smoked 1-2 PPD since she was a kid. Quit in     Allergies  No Known Allergies    REVIEW OF SYSTEMS  Negative except as noted in HPI    HOME MEDICATIONS:  Home Medications:  aspirin 81 mg oral tablet: 1 tab(s) orally once a day (08 Mar 2019 09:18)  atorvastatin 40 mg oral tablet: 1 tab(s) orally once a day (at bedtime) (08 Mar 2019 09:18)  buPROPion 200 mg/12 hours (SR) oral tablet, extended release: orally once a day (08 Mar 2019 09:18)  desloratadine 5 mg oral tablet: 1 tab(s) orally once a day (08 Mar 2019 09:18)  furosemide 20 mg oral tablet: 1 tab(s) orally once a day (08 Mar 2019 09:18)  gabapentin 600 mg oral tablet: 2 tab(s) orally 3 times a day (08 Mar 2019 09:18)  glimepiride 4 mg oral tablet: 1 tab(s) orally once a day (at bedtime) (08 Mar 2019 09:18)  Lantus:  (11 May 2018 10:56)  lisinopril 5 mg oral tablet: 1 tab(s) orally once a day (11 May 2018 10:56)  Metoprolol Succinate ER 50 mg oral tablet, extended release: 1 tab(s) orally once a day (11 May 2018 10:56)  ProAir HFA 90 mcg/inh inhalation aerosol: 2 puff(s) inhaled 4 times a day (11 May 2018 10:56)  Trulicity Pen: 5  subcutaneous (11 May 2018 10:56)  Vitamin D3 5000 intl units oral capsule: 1 cap(s) orally once a day (11 May 2018 10:56)    MEDICATIONS:  Antibiotics:    Neuro:    Anticoagulation:    OTHER:  chlorhexidine 2% Cloths 1 Application(s) Topical daily  dexamethasone  Injectable 10 milliGRAM(s) IV Push Once    IVF:    Vital Signs Last 24 Hrs  T(C): 37.6 (02 May 2019 10:49), Max: 37.8 (02 May 2019 10:33)  T(F): 99.7 (02 May 2019 10:49), Max: 100 (02 May 2019 10:33)  HR: 105 (02 May 2019 12:52) (103 - 111)  BP: 136/67 (02 May 2019 12:52) (136/67 - 167/85)  BP(mean): --  RR: 18 (02 May 2019 12:52) (18 - 35)  SpO2: 96% (02 May 2019 12:52) (95% - 96%)    PHYSICAL EXAM:  GENERAL: NAD, well-groomed, well-developed  HEAD:  Atraumatic, normocephalic  EYES: Conjunctiva and sclera clear  ENMT: Moist mucous membranes, poor dentition  NECK: Supple  STEPHANIE COMA SCORE: E-4  V- 5 M- 6= 15  MENTAL STATUS: AAO x3; Appropriately conversant without aphasia; following commands  CRANIAL NERVES: PERRL. EOMI without nystagmus. Facial sensation intact V1-3 distribution b/l. Very slight R facial droop vs baseline asymmetry from not having dentures in per daughter, tongue midline. Hearing grossly intact. Speech clear. Shoulder shrug intact.   MOTOR: strength 5/5 b/l upper and lower extremities  SENSATION: grossly intact to light touch all extremities  REFLEXES: No clonus b/l  CHEST/LUNG: Nonlabored breathing or respiratory distress on O2  HEART: Regular rate  ABDOMEN: Soft, nontender, nondistended  EXTREMITIES: Mild tenderness to palpation RLE popliteal region  SKIN: Warm, dry    LABS:                        10.9   10.8  )-----------( 241      ( 02 May 2019 10:13 )             34.8         140  |  104  |  20.0  ----------------------------<  159<H>  5.0   |  22.0  |  0.92    Ca    9.6      02 May 2019 10:13    TPro  8.1  /  Alb  3.7  /  TBili  0.6  /  DBili  x   /  AST  24  /  ALT  13  /  AlkPhos  101  05-02    PT/INR - ( 02 May 2019 10:13 )   PT: 14.2 sec;   INR: 1.23 ratio      PTT - ( 02 May 2019 10:13 )  PTT:29.4 sec  Urinalysis Basic - ( 02 May 2019 11:59 )    Color: Yellow / Appearance: Clear / S.015 / pH: x  Gluc: x / Ketone: Small  / Bili: Negative / Urobili: Negative mg/dL   Blood: x / Protein: 30 mg/dL / Nitrite: Negative   Leuk Esterase: Negative / RBC: Negative /HPF / WBC 0-2   Sq Epi: x / Non Sq Epi: Occasional / Bacteria: Negative    RADIOLOGY & ADDITIONAL STUDIES:  CT Angio Chest w/ IV Cont (19 @ 12:25)  IMPRESSION:   Pulmonary embolism involving a segmental branch to the anterior basilar   segment of the right lower lobe with extension into subsegmental branches.  Interval progression of metastatic disease including an interval increase   in size and number of innumerable pulmonary nodules, interval development   of mediastinal and bilateral hilar lymphadenopathy, and interval increase   in size of a partially imaged mass at the posterior aspect of the left   kidney.  Osseous metastatic disease on the prior thoracic and lumbar spine MRIs   3/18/2019 and 3/16/2019 are not well seen on CT.  The findings were discussed with Dr. Romero at 12:53 PM on 2019.    CT Head No Cont (19 @ 12:21)  IMPRESSION: Multiple intracranial hyperdense/hemorrhagic metastasis.   Findings discussed with Dr. Romero.    MR Cervical/Thoracic Spine w/wo IV Cont (19 @ 17:17)  CERVICAL SPINE:   There is mild degeneration between the dens and the anterior arch of C1.   Mild disc bulging and osteophyte formation is present at C4-C5 with   moderate left and mild right foraminal narrowing. There is mild central   canal stenosis. There is mild disc bulging at C5-C6 with small osteophyte   formation with mild to moderate right and mild left foraminal narrowing.   Mild disc bulging is present at C6-C7 with small osteophyte formation.   There is mild bilateral foraminal narrowing and mild central canal   stenosis.  There is no cord signal abnormality or abnormal enhancement.  There is no osseous metastatic disease noted within the cervical spine.   There is no abnormal osseous enhancement or marrow edema.  The visualized portions of the brain are within normal limits.    THORACIC SPINE: There is subtle patchy low T1 signal abnormality within   the anterior inferior aspect of the T1 vertebral body with associated   enhancement. A similar but smaller region of signal abnormality  is seen   within the superior endplate of T5. Larger regions of similar signal   abnormality are noted within T8, T9, T10, T11 and T12. Each of these   lesions are compatible with osseous metastatic disease. The largest   lesions are in T9, T10, T11 and T12 comprising the majority of the   vertebral body. There is no pathologic fracture. There is no evidence of   epidural extension of disease.  There is mild multilevel degenerative disc disease of the thoracic spine.  There isno cord signal abnormality or abnormal cord enhancement.    IMPRESSION: Multifocal osseous metastatic disease within the thoracic   spine as described above. No evidence of pathologic fracture. No epidural   extension of disease. No cord signal abnormality.  Mild to moderate cervical spondylosis with mild thoracic spondylosis.    MR Lumbar Spine w/wo IV Cont (19 @ 14:04)  Findings:  There is diffuse osseous metastatic disease with associated enhancement.   The heights of vertebral bodies are preserved. The disc heights are   preserved. Conus is at T12-L1 and appears unremarkable. There   postsurgical changes posterior to the lower pole the right kidney,   unchanged. There is a right lower polerenal cyst.  The findings at the individual levels are as follows:  L1-L2: No disc herniation.  L2-L3: Minimal disc bulge.  L3-L4: Severe right and moderate left facet arthropathy. Minimal   bilateral foraminal narrowing and mild spinal canalstenosis in part   secondary to prominent posterior epidural fat.  L4-L5: Moderate to severe right greater than left facet arthropathy. Left   foraminal annular fissure with a minimal left foraminal disc protrusion.   Minimal bilateral foraminal narrowing.  L5-S1: Severe left and moderate right facet arthropathy. Mild left   paracentral disc protrusion. There is minimal right foraminal narrowing.   There is marked narrowing of the thecal sac on the basis of epidural fat.  Impression: Diffuse osseous metastatic disease as above.    CT Cervical Spine No Cont (19 @ 12:28)  IMPRESSION:  Heterogeneity to the C6, C7 and T1 vertebral bodies. Nonspecific specific   5 mm sclerotic focus in the T3 vertebral body. Consider MRI for further   evaluation to exclude osseous metastasis. Multiple bilateral pulmonary   metastatic nodules.    CT Head No Cont (19 @ 12:28)  IMPRESSION:  Mild chronic microvascular changes without evidence of an   acute transcortical infarction or hemorrhage. MR is a more sensitive   imaging modality for the evaluation of an acute infarction.     CT Chest w/ IV Cont (19 @ 16:59)  IMPRESSION:    Findings compatible with interval diseaseprogression.  Increase in the previously noted enhancing left perinephric mass.  Interval development/worsening of numerous pulmonary nodules measuring up   to 1 cm with increase in size of the previously noted destructive lesion   involving the right superior and inferior pubic rami bones with   additional likely pelvic bony metastases.   Hypervascular lesion in the posterior right hepatic lobe new from prior   exam may represent a hypervascular metastasis. Stable right adrenal   nodule. HISTORY OF PRESENT ILLNESS:   63yF PMH COPD, HLD, HTN, ARIK, mitral valve/aortic valve replacement , DM, renal cell carcinoma diagnosed 2017 followed by Dr. Wise s/p right kidney cryoablation 3/7/17 and currently on Opdivo 2 doses every 3 weeks, with known metastases to pelvis, thoracic/lumbar spine, lungs, and liver, s/p radiation to pelvis and thoracic spine followed by Dr. Barbosa. Patient presents to ED after having an episode of hot/cold sweats and coughing up/throwing up blood. Daughter states patient has been having intermittent episodes of hot/cold sweats with associated nausea that last 1-2 days and then she feels better for the last 4 weeks. During these episodes patient becomes confused and appears to have hallucinations. Last week, patient had her worst "episode," starting on Friday where she again had hold/cold sweats through  where she laid down all day and didn't. Today she started having another episode and had 2 episodes of hemoptysis/hematemesis around 06:45 AM and was brought to ED. Patient also noted to have 2 episodes of "her whole body shaking uncontrollably for a few seconds" a few days ago. Our service consulted as CT head today shows new hemorrhagic brain metastases. Patient states she came in and had headache and chest pain but currently both have resolved. Denies weakness, paresthesias, dizziness, SOB, abdominal pain. Last took ASA 81 3 weeks ago. Denies taking other anticoagulation/antiplatelet therapy.    PAST MEDICAL & SURGICAL HISTORY:  Kidney mass  Asthma  Hyperlipidemia  Hypertension  COPD (chronic obstructive pulmonary disease)  Obstructive sleep apnea: does not use CPAP machine  Mitral valve disorder  Aortic stenosis  Diabetes mellitus  Status post cryoablation: Of R kidney mass 3/7/17  S/P MVR (mitral valve replacement)  S/P AVR  S/P cholecystectomy  S/P tonsillectomy  History of  section: ,     FAMILY HISTORY:  Family history of cirrhosis of liver  Family history of hypertension (Sibling)    SOCIAL HISTORY:  Tobacco Use: Past smoker, smoked 1-2 PPD since she was a kid. Quit in     Allergies  No Known Allergies    REVIEW OF SYSTEMS  Negative except as noted in HPI    HOME MEDICATIONS:  Home Medications:  aspirin 81 mg oral tablet: 1 tab(s) orally once a day (08 Mar 2019 09:18)  atorvastatin 40 mg oral tablet: 1 tab(s) orally once a day (at bedtime) (08 Mar 2019 09:18)  buPROPion 200 mg/12 hours (SR) oral tablet, extended release: orally once a day (08 Mar 2019 09:18)  desloratadine 5 mg oral tablet: 1 tab(s) orally once a day (08 Mar 2019 09:18)  furosemide 20 mg oral tablet: 1 tab(s) orally once a day (08 Mar 2019 09:18)  gabapentin 600 mg oral tablet: 2 tab(s) orally 3 times a day (08 Mar 2019 09:18)  glimepiride 4 mg oral tablet: 1 tab(s) orally once a day (at bedtime) (08 Mar 2019 09:18)  Lantus:  (11 May 2018 10:56)  lisinopril 5 mg oral tablet: 1 tab(s) orally once a day (11 May 2018 10:56)  Metoprolol Succinate ER 50 mg oral tablet, extended release: 1 tab(s) orally once a day (11 May 2018 10:56)  ProAir HFA 90 mcg/inh inhalation aerosol: 2 puff(s) inhaled 4 times a day (11 May 2018 10:56)  Trulicity Pen: 5  subcutaneous (11 May 2018 10:56)  Vitamin D3 5000 intl units oral capsule: 1 cap(s) orally once a day (11 May 2018 10:56)    MEDICATIONS:  Antibiotics:    Neuro:    Anticoagulation:    OTHER:  chlorhexidine 2% Cloths 1 Application(s) Topical daily  dexamethasone  Injectable 10 milliGRAM(s) IV Push Once    IVF:    Vital Signs Last 24 Hrs  T(C): 37.6 (02 May 2019 10:49), Max: 37.8 (02 May 2019 10:33)  T(F): 99.7 (02 May 2019 10:49), Max: 100 (02 May 2019 10:33)  HR: 105 (02 May 2019 12:52) (103 - 111)  BP: 136/67 (02 May 2019 12:52) (136/67 - 167/85)  BP(mean): --  RR: 18 (02 May 2019 12:52) (18 - 35)  SpO2: 96% (02 May 2019 12:52) (95% - 96%)    PHYSICAL EXAM:  GENERAL: NAD, well-groomed, well-developed  HEAD:  Atraumatic, normocephalic  EYES: Conjunctiva and sclera clear  ENMT: Moist mucous membranes, poor dentition  NECK: Supple  STEPHANIE COMA SCORE: E-4  V- 5 M- 6= 15  MENTAL STATUS: AAO x3; Appropriately conversant without aphasia; following commands  CRANIAL NERVES: PERRL. EOMI without nystagmus. Facial sensation intact V1-3 distribution b/l. Very slight R facial droop vs baseline asymmetry from not having dentures in per daughter, tongue midline. Hearing grossly intact. Speech clear. Shoulder shrug intact.   MOTOR: strength 5/5 b/l upper and lower extremities  SENSATION: grossly intact to light touch all extremities  REFLEXES: No clonus b/l  CHEST/LUNG: Nonlabored breathing or respiratory distress on O2  HEART: Regular rate  ABDOMEN: Soft, nontender, nondistended  EXTREMITIES: Mild tenderness to palpation RLE popliteal region  SKIN: Warm, dry    LABS:                        10.9   10.8  )-----------( 241      ( 02 May 2019 10:13 )             34.8         140  |  104  |  20.0  ----------------------------<  159<H>  5.0   |  22.0  |  0.92    Ca    9.6      02 May 2019 10:13    TPro  8.1  /  Alb  3.7  /  TBili  0.6  /  DBili  x   /  AST  24  /  ALT  13  /  AlkPhos  101  05-02    PT/INR - ( 02 May 2019 10:13 )   PT: 14.2 sec;   INR: 1.23 ratio      PTT - ( 02 May 2019 10:13 )  PTT:29.4 sec  Urinalysis Basic - ( 02 May 2019 11:59 )    Color: Yellow / Appearance: Clear / S.015 / pH: x  Gluc: x / Ketone: Small  / Bili: Negative / Urobili: Negative mg/dL   Blood: x / Protein: 30 mg/dL / Nitrite: Negative   Leuk Esterase: Negative / RBC: Negative /HPF / WBC 0-2   Sq Epi: x / Non Sq Epi: Occasional / Bacteria: Negative    RADIOLOGY & ADDITIONAL STUDIES:  CT Angio Chest w/ IV Cont (19 @ 12:25)  IMPRESSION:   Pulmonary embolism involving a segmental branch to the anterior basilar   segment of the right lower lobe with extension into subsegmental branches.  Interval progression of metastatic disease including an interval increase   in size and number of innumerable pulmonary nodules, interval development   of mediastinal and bilateral hilar lymphadenopathy, and interval increase   in size of a partially imaged mass at the posterior aspect of the left   kidney.  Osseous metastatic disease on the prior thoracic and lumbar spine MRIs   3/18/2019 and 3/16/2019 are not well seen on CT.  The findings were discussed with Dr. Romero at 12:53 PM on 2019.    CT Head No Cont (19 @ 12:21)  IMPRESSION: Multiple intracranial hyperdense/hemorrhagic metastasis.   Findings discussed with Dr. Romero.    MR Cervical/Thoracic Spine w/wo IV Cont (19 @ 17:17)  CERVICAL SPINE:   There is mild degeneration between the dens and the anterior arch of C1.   Mild disc bulging and osteophyte formation is present at C4-C5 with   moderate left and mild right foraminal narrowing. There is mild central   canal stenosis. There is mild disc bulging at C5-C6 with small osteophyte   formation with mild to moderate right and mild left foraminal narrowing.   Mild disc bulging is present at C6-C7 with small osteophyte formation.   There is mild bilateral foraminal narrowing and mild central canal   stenosis.  There is no cord signal abnormality or abnormal enhancement.  There is no osseous metastatic disease noted within the cervical spine.   There is no abnormal osseous enhancement or marrow edema.  The visualized portions of the brain are within normal limits.    THORACIC SPINE: There is subtle patchy low T1 signal abnormality within   the anterior inferior aspect of the T1 vertebral body with associated   enhancement. A similar but smaller region of signal abnormality  is seen   within the superior endplate of T5. Larger regions of similar signal   abnormality are noted within T8, T9, T10, T11 and T12. Each of these   lesions are compatible with osseous metastatic disease. The largest   lesions are in T9, T10, T11 and T12 comprising the majority of the   vertebral body. There is no pathologic fracture. There is no evidence of   epidural extension of disease.  There is mild multilevel degenerative disc disease of the thoracic spine.  There isno cord signal abnormality or abnormal cord enhancement.    IMPRESSION: Multifocal osseous metastatic disease within the thoracic   spine as described above. No evidence of pathologic fracture. No epidural   extension of disease. No cord signal abnormality.  Mild to moderate cervical spondylosis with mild thoracic spondylosis.    MR Lumbar Spine w/wo IV Cont (19 @ 14:04)  Findings:  There is diffuse osseous metastatic disease with associated enhancement.   The heights of vertebral bodies are preserved. The disc heights are   preserved. Conus is at T12-L1 and appears unremarkable. There   postsurgical changes posterior to the lower pole the right kidney,   unchanged. There is a right lower polerenal cyst.  The findings at the individual levels are as follows:  L1-L2: No disc herniation.  L2-L3: Minimal disc bulge.  L3-L4: Severe right and moderate left facet arthropathy. Minimal   bilateral foraminal narrowing and mild spinal canalstenosis in part   secondary to prominent posterior epidural fat.  L4-L5: Moderate to severe right greater than left facet arthropathy. Left   foraminal annular fissure with a minimal left foraminal disc protrusion.   Minimal bilateral foraminal narrowing.  L5-S1: Severe left and moderate right facet arthropathy. Mild left   paracentral disc protrusion. There is minimal right foraminal narrowing.   There is marked narrowing of the thecal sac on the basis of epidural fat.  Impression: Diffuse osseous metastatic disease as above.    CT Cervical Spine No Cont (19 @ 12:28)  IMPRESSION:  Heterogeneity to the C6, C7 and T1 vertebral bodies. Nonspecific specific   5 mm sclerotic focus in the T3 vertebral body. Consider MRI for further   evaluation to exclude osseous metastasis. Multiple bilateral pulmonary   metastatic nodules.    CT Head No Cont (19 @ 12:28)  IMPRESSION:  Mild chronic microvascular changes without evidence of an   acute transcortical infarction or hemorrhage. MR is a more sensitive   imaging modality for the evaluation of an acute infarction.     CT Chest w/ IV Cont (19 @ 16:59)  IMPRESSION:    Findings compatible with interval diseaseprogression.  Increase in the previously noted enhancing left perinephric mass.  Interval development/worsening of numerous pulmonary nodules measuring up   to 1 cm with increase in size of the previously noted destructive lesion   involving the right superior and inferior pubic rami bones with   additional likely pelvic bony metastases.   Hypervascular lesion in the posterior right hepatic lobe new from prior   exam may represent a hypervascular metastasis. Stable right adrenal   nodule.

## 2019-05-02 NOTE — H&P ADULT - PROBLEM SELECTOR PLAN 4
bilateral RCCC with recurrence of disease, now widely metastatic   overall prognosis grave, will engage palliative care services to assist with GOC

## 2019-05-02 NOTE — HISTORY OF PRESENT ILLNESS
[FreeTextEntry1] : Ms. Winters is a 63 year old female here today for her post treatment evaluation. She has stage IV renal carcinoma. She presented with thoracic spine pain, corresponding to multifocal osseous metastatic disease. She underwent palliative therapy which consisted of 2000 cGy to the thoracic spine from 3/29/19-4/4/19. Today she xxx

## 2019-05-02 NOTE — H&P ADULT - CONSTITUTIONAL COMMENTS
Pt lying in bed, alert, reactive, answering questions, but appears somnolent and in no acute distress. Appears well groomed, well nourished

## 2019-05-02 NOTE — H&P ADULT - MUSCULOSKELETAL
details… detailed exam no calf tenderness/ROM intact/no joint warmth/normal/no joint swelling/no joint erythema

## 2019-05-02 NOTE — H&P ADULT - RS GEN PE MLT RESP DETAILS PC
no rales/no rhonchi/no wheezes/respirations non-labored/breath sounds equal/good air movement/clear to auscultation bilaterally

## 2019-05-02 NOTE — CONSULT NOTE ADULT - ASSESSMENT
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    PLAN  1) No further systemic therapy is available here - palliative care consultation advised.  2) Agree with Heydi filter since anticoagulation not feasible oin setting of hemorrhagic brain metastases.  3) Spoke with Dr. 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    PLAN  1) No further systemic therapy is available here - palliative care consultation advised.  2) Agree with Heydi filter since anticoagulation not feasible in setting of hemorrhagic brain metastases.  3) Spoke with Dr. Barbosa (Radiation Oncology) - will consult

## 2019-05-02 NOTE — CONSULT NOTE ADULT - ASSESSMENT
63y Female referred for IVC filter placement.  Although there is no current evidence of DVT, the referring MD feels she is at high risk for recurrent DVT and PE and given that she cannot be anticoagulated warrants IVC filter placement.  Will schedule IVC filter placement.

## 2019-05-02 NOTE — H&P ADULT - HISTORY OF PRESENT ILLNESS
Patient is a 63 year old female with PMHx COPD, Mitral/Aortic valve repair (2015), and Renal Cell Carcinoma (diagnosed 3/17) with Patient is a 63 year old female with PMHx HTN, ARIK, HLD, COPD, Mitral valve and Aortic valve replacement (2014), DM, and Renal Cell Carcinoma (diagnosed 3/17) s/p right kidney cryoablation (3/17) currently on OpDivo for past 3 weeks and with known mets to pelvis, thoracic/lumbar spine, lungs, and liver, presents with 3 week history of lethargy, dizziness, h/a, vomiting (1 episode of hematemesis this morning as per daughter) and AMS. Pt's daughter endorsed that pt's symptoms have been ongoing for 3 weeks, with intermittent episodes of 1-2 days of confusion followed by 3-4 days of clear sensorium. During this time pt experiences uncontrollable shaking and dizziness, especially when standing up from sitting/laying position (daughter is adamant that pt does not have feeling of passing out), h/a unrelieved by tylenol, nausea, vomiting and AMS. As per daughter, pt had worst week last week, describing episodes Patient is a 63 year old female with PMHx HTN, ARIK, HLD, COPD, Mitral valve and Aortic valve replacement (2014), DM, and Renal Cell Carcinoma (diagnosed 3/17) s/p right kidney cryoablation (3/17) currently on OpDivo for past 3 weeks and with known mets to pelvis, thoracic/lumbar spine, lungs, and liver, presents with 3 week history of lethargy, dizziness, h/a, vomiting (1 episode of hematemesis this morning as per daughter) and AMS. Pt's daughter endorsed that pt's symptoms have been ongoing for 3 weeks, with intermittent episodes of 1-2 days of confusion followed by 3-4 days of clear sensorium, but has significantly worsened the past week. During this time pt experiences uncontrollable shaking and dizziness, especially when standing up from sitting/laying position (daughter is adamant that pt does not have feeling of passing out), h/a unrelieved by tylenol, nausea, vomiting and AMS. As per daughter, pt had worst week last week, describing episodes of confusion and even hallucinations, and without being able to "get up out of bed all day". Daughter states these symptoms have started when OpDivo regimen began, and endorses following chemo regimen did not produce similar episodes/symptoms (daughter unable to recall name of chemo drug). Daughter states past 2 days have been particularly difficult for pt and has not been able to function at her baseline (daughter endorses baseline being able to take care of herself, cook, clean). Also describes episode vomiting of small amounts of dark red blood earlier this morning (5/2), pt has not vomited since. Denies SOB, flank pain, changes in vision, chest pain, abdominal pain, weakness, and paresthesias. Of note: CT Head/chest done in ED, shows new hemorrhagic brain mets and segmental PE. Pt not currently taking any anticoagulation/antiplatelet therapy.

## 2019-05-02 NOTE — ED PROVIDER NOTE - CARE PLAN
Principal Discharge DX:	Intracranial bleeding  Secondary Diagnosis:	Brain metastases  Secondary Diagnosis:	Pulmonary embolism

## 2019-05-02 NOTE — ED PROVIDER NOTE - OBJECTIVE STATEMENT
64 yo female with metastatic renal CA 62 yo female with metastatic renal CA to lungs and bones; currently on OpDivo every 4 weeks; BIB daughter for several days of confusion and weakness; states she's had similar episodes of weakness fatigue, nausea/vomiting, and confusion for a day or two at most after her treatments, but now she has been confused and "out of it" for 4-5 days, very unlike her; some assoc decreased appetite and feeling "warm" but no fever; +cough; does not typically wear oxygen, noted on arrival to have room air sat 91%

## 2019-05-02 NOTE — CONSULT NOTE ADULT - SUBJECTIVE AND OBJECTIVE BOX
REASON FOR CONSULTATION    HPI:  Patient is a 63 year old female with PMHx HTN, ARIK, HLD, COPD, Mitral valve and Aortic valve replacement (), DM, and Renal Cell Carcinoma (diagnosed 3/17) s/p right kidney cryoablation (3/17) currently on OpDivo for past 3 weeks and with known mets to pelvis, thoracic/lumbar spine, lungs, and liver, presents with 3 week history of lethargy, dizziness, h/a, vomiting (1 episode of hematemesis this morning as per daughter) and AMS. Pt's daughter endorsed that pt's symptoms have been ongoing for 3 weeks, with intermittent episodes of 1-2 days of confusion followed by 3-4 days of clear sensorium, but has significantly worsened the past week. During this time pt experiences uncontrollable shaking and dizziness, especially when standing up from sitting/laying position (daughter is adamant that pt does not have feeling of passing out), h/a unrelieved by tylenol, nausea, vomiting and AMS. As per daughter, pt had worst week last week, describing episodes of confusion and even hallucinations, and without being able to "get up out of bed all day". Daughter states these symptoms have started when OpDivo regimen began, and endorses following chemo regimen did not produce similar episodes/symptoms (daughter unable to recall name of chemo drug). Daughter states past 2 days have been particularly difficult for pt and has not been able to function at her baseline (daughter endorses baseline being able to take care of herself, cook, clean). Also describes episode vomiting of small amounts of dark red blood earlier this morning (5/), pt has not vomited since. Denies SOB, flank pain, changes in vision, chest pain, abdominal pain, weakness, and paresthesias. Of note: CT Head/chest done in ED, shows new hemorrhagic brain mets and segmental PE. Pt not currently taking any anticoagulation/antiplatelet therapy. (02 May 2019 13:35)    Lenka is followed by Dr. Wise, and Dr. Barbosa at [facility] for a history of recurrent renal cell carcinoma. History dates to 2017 when she underwent percutaneous cryoablation for bilateral clear cell renal cell carcinoma. She had initially presented with a right renal mass and left renal mass. In March and May of 2017, respectively. The first followup CT scan in 2017 showed no residual. In 2018 MRI revealed a posterior left interpolar lesion. Repeat cryoablation of the left renal lesion was considered. The pain progressively worsened and in 2018 a new 4 cm lytic lesion in the inferior right pubic ramus came evident that region received palliative radiation in May 2018. Systemic therapy with cabozantinib was used from May 2018 to 2018, but held because of hand-foot syndrome.  Pazopanib was attempted and also discontinued for the same reason. Nivolumab immunotherapy began in late 2018. On 2019 ipilibumab was added because of progressive disease in the bone kidney and lung.  Patient is now admitted with symptomatic hemorrhagic brain metastases, progressive lung and bone mets, and evidence of segmental PE.    REVIEW OF SYSTEMS:    CONSTITUTIONAL:  fatigue  BREASTS: No pain, masses, or nipple discharge  RESPIRATORY: No cough, wheezing, chills or hemoptysis; No shortness of breath  CARDIOVASCULAR: No chest pain, palpitations, dizziness, or leg swelling  GASTROINTESTINAL: hematemesis  GENITOURINARY: No dysuria, frequency, hematuria, or incontinence  NEUROLOGICAL: lethargy, dizziness  MUSCULOSKELETAL: No joint pain or swelling; No muscle, back, or extremity pain  HEME/LYMPH: No easy bruising, or bleeding gums      PAST MEDICAL & SURGICAL HISTORY:  Kidney mass  Asthma  Hyperlipidemia  Hypertension  COPD (chronic obstructive pulmonary disease)  Obstructive sleep apnea: does not use CPAP machine  Mitral valve disorder  Aortic stenosis  Diabetes mellitus  Status post cryoablation: Of R kidney mass 3/7/17  S/P MVR (mitral valve replacement)  S/P AVR  S/P cholecystectomy  S/P tonsillectomy  History of  section: ,       SOCIAL HISTORY:    FAMILY HISTORY:  Family history of cirrhosis of liver  Family history of hypertension (Sibling)      SOCIAL HISTORY:    Allergies    No Known Allergies    Intolerances        MEDICATIONS  (STANDING):  chlorhexidine 2% Cloths 1 Application(s) Topical daily    MEDICATIONS  (PRN):      Vital Signs Last 24 Hrs  T(C): 36.7 (02 May 2019 14:47), Max: 37.8 (02 May 2019 10:33)  T(F): 98.1 (02 May 2019 14:47), Max: 100 (02 May 2019 10:33)  HR: 94 (02 May 2019 14:47) (94 - 111)  BP: 113/56 (02 May 2019 14:47) (113/56 - 167/85)  BP(mean): --  RR: 17 (02 May 2019 14:47) (17 - 35)  SpO2: 96% (02 May 2019 14:47) (95% - 96%)    PHYSICAL EXAM:    GENERAL: illo-appearing  EYES: EOMI, PERRLA, conjunctiva and sclera clear  ENMT: No tonsillar erythema, exudates, or enlargement; Moist mucous membranes, Good dentition, No lesions  NECK: Supple, No JVD, Normal thyroid  NERVOUS SYSTEM:  Alert & Oriented X3, Good concentration; Motor Strength 5/5 B/L upper and lower extremities; DTRs 2+ intact and symmetric  CHEST/LUNG: Clear to percussion bilaterally; No rales, rhonchi, wheezing, or rubs  HEART: Regular rate and rhythm; No murmurs, rubs, or gallops  ABDOMEN: Soft, Nontender, Nondistended; Bowel sounds present  EXTREMITIES:  2+ Peripheral Pulses, No clubbing, cyanosis, or edema  LYMPH: No lymphadenopathy noted  SKIN: No rashes or lesions      LABS:                        10.9   10.8  )-----------( 241      ( 02 May 2019 10:13 )             34.8     05-02    140  |  104  |  20.0  ----------------------------<  159<H>  5.0   |  22.0  |  0.92    Ca    9.6      02 May 2019 10:13    TPro  8.1  /  Alb  3.7  /  TBili  0.6  /  DBili  x   /  AST  24  /  ALT  13  /  AlkPhos  101  05-02    PT/INR - ( 02 May 2019 10:13 )   PT: 14.2 sec;   INR: 1.23 ratio         PTT - ( 02 May 2019 10:13 )  PTT:29.4 sec

## 2019-05-02 NOTE — H&P ADULT - NSICDXPASTSURGICALHX_GEN_ALL_CORE_FT
PAST SURGICAL HISTORY:  History of  section 1983    S/P AVR     S/P cholecystectomy     S/P MVR (mitral valve replacement)     S/P tonsillectomy     Status post cryoablation Of R kidney mass 3/7/17

## 2019-05-02 NOTE — H&P ADULT - NSICDXFAMILYHX_GEN_ALL_CORE_FT
FAMILY HISTORY:  Family history of cirrhosis of liver    Sibling  Still living? Yes, Estimated age: 51-60  Family history of hypertension, Age at diagnosis: Age Unknown

## 2019-05-02 NOTE — CONSULT NOTE ADULT - REASON FOR ADMISSION
New METs to brain, PE, AMS

## 2019-05-03 VITALS — WEIGHT: 206.57 LBS

## 2019-05-03 DIAGNOSIS — C64.9 MALIGNANT NEOPLASM OF UNSPECIFIED KIDNEY, EXCEPT RENAL PELVIS: ICD-10-CM

## 2019-05-03 LAB
CULTURE RESULTS: NO GROWTH — SIGNIFICANT CHANGE UP
SPECIMEN SOURCE: SIGNIFICANT CHANGE UP

## 2019-05-03 PROCEDURE — 87040 BLOOD CULTURE FOR BACTERIA: CPT

## 2019-05-03 PROCEDURE — C1880: CPT

## 2019-05-03 PROCEDURE — 93970 EXTREMITY STUDY: CPT

## 2019-05-03 PROCEDURE — 99291 CRITICAL CARE FIRST HOUR: CPT

## 2019-05-03 PROCEDURE — 99285 EMERGENCY DEPT VISIT HI MDM: CPT | Mod: 25

## 2019-05-03 PROCEDURE — 81001 URINALYSIS AUTO W/SCOPE: CPT

## 2019-05-03 PROCEDURE — 76942 ECHO GUIDE FOR BIOPSY: CPT

## 2019-05-03 PROCEDURE — 96368 THER/DIAG CONCURRENT INF: CPT

## 2019-05-03 PROCEDURE — 71275 CT ANGIOGRAPHY CHEST: CPT

## 2019-05-03 PROCEDURE — 71045 X-RAY EXAM CHEST 1 VIEW: CPT

## 2019-05-03 PROCEDURE — 87086 URINE CULTURE/COLONY COUNT: CPT

## 2019-05-03 PROCEDURE — 76000 FLUOROSCOPY <1 HR PHYS/QHP: CPT

## 2019-05-03 PROCEDURE — 96366 THER/PROPH/DIAG IV INF ADDON: CPT

## 2019-05-03 PROCEDURE — 80053 COMPREHEN METABOLIC PANEL: CPT

## 2019-05-03 PROCEDURE — 85610 PROTHROMBIN TIME: CPT

## 2019-05-03 PROCEDURE — 36415 COLL VENOUS BLD VENIPUNCTURE: CPT

## 2019-05-03 PROCEDURE — 83605 ASSAY OF LACTIC ACID: CPT

## 2019-05-03 PROCEDURE — 96365 THER/PROPH/DIAG IV INF INIT: CPT | Mod: XU

## 2019-05-03 PROCEDURE — 85730 THROMBOPLASTIN TIME PARTIAL: CPT

## 2019-05-03 PROCEDURE — 85027 COMPLETE CBC AUTOMATED: CPT

## 2019-05-03 PROCEDURE — C1769: CPT

## 2019-05-03 PROCEDURE — 93005 ELECTROCARDIOGRAM TRACING: CPT

## 2019-05-03 PROCEDURE — 70450 CT HEAD/BRAIN W/O DYE: CPT

## 2019-05-03 RX ADMIN — MORPHINE SULFATE 4 MILLIGRAM(S): 50 CAPSULE, EXTENDED RELEASE ORAL at 06:00

## 2019-05-03 RX ADMIN — Medication 1 MILLIGRAM(S): at 10:29

## 2019-05-03 RX ADMIN — MORPHINE SULFATE 4 MILLIGRAM(S): 50 CAPSULE, EXTENDED RELEASE ORAL at 08:29

## 2019-05-03 RX ADMIN — SCOPALAMINE 1 PATCH: 1 PATCH, EXTENDED RELEASE TRANSDERMAL at 03:21

## 2019-05-03 RX ADMIN — Medication 1 MILLIGRAM(S): at 08:29

## 2019-05-03 RX ADMIN — MORPHINE SULFATE 4 MILLIGRAM(S): 50 CAPSULE, EXTENDED RELEASE ORAL at 12:16

## 2019-05-03 RX ADMIN — MORPHINE SULFATE 4 MILLIGRAM(S): 50 CAPSULE, EXTENDED RELEASE ORAL at 05:30

## 2019-05-03 RX ADMIN — SCOPALAMINE 1 PATCH: 1 PATCH, EXTENDED RELEASE TRANSDERMAL at 07:00

## 2019-05-03 RX ADMIN — LEVETIRACETAM 420 MILLIGRAM(S): 250 TABLET, FILM COATED ORAL at 05:08

## 2019-05-03 RX ADMIN — Medication 4 MILLIGRAM(S): at 05:08

## 2019-05-03 RX ADMIN — Medication 1 MILLIGRAM(S): at 05:30

## 2019-05-03 RX ADMIN — CHLORHEXIDINE GLUCONATE 1 APPLICATION(S): 213 SOLUTION TOPICAL at 12:16

## 2019-05-03 RX ADMIN — Medication 2 MILLIGRAM(S): at 12:46

## 2019-05-03 NOTE — DIETITIAN INITIAL EVALUATION ADULT. - PROBLEM SELECTOR PLAN 1
Due to new brain METS from primary renal cell carcinoma  Admit to ICU with q1h neuro checks, STAT CT head for any acute changes in neurologic exam  Neurosurgery consulted   Continue steroids and Keppra for seizure prophylaxis  Repeat head CT in 6 hours to eval stability of hemorrhage   Neurology following  heme/onc consult- Dr. Wise   Hold anticoagulation/antiplatelet therapy

## 2019-05-03 NOTE — PROGRESS NOTE ADULT - SUBJECTIVE AND OBJECTIVE BOX
Patient is a 63y old  Female who presents with a chief complaint of New METs to brain, PE, AMS (02 May 2019 21:59)      BRIEF HOSPITAL COURSE:   63 year old female with PMHx HTN, ARIK, HLD, COPD, Mitral valve and Aortic valve replacement (), DM, and Renal Cell Carcinoma (diagnosed 3/17) s/p right kidney cryoablation (3/17) currently on OpDivo for past 3 weeks and with known mets to pelvis, thoracic/lumbar spine, lungs, and liver, presents with 3 week history of lethargy, dizziness, h/a, vomiting (1 episode of hematemesis this morning as per daughter) and AMS. Pt's daughter endorsed that pt's symptoms have been ongoing for 3 weeks, with intermittent episodes of 1-2 days of confusion followed by 3-4 days of clear sensorium, but has significantly worsened the past week. During this time pt experiences uncontrollable shaking and dizziness, especially when standing up from sitting/laying position (daughter is adamant that pt does not have feeling of passing out), h/a unrelieved by tylenol, nausea, vomiting and AMS. As per daughter, pt had worst week last week, describing episodes of confusion and even hallucinations, and without being able to "get up out of bed all day". Daughter states these symptoms have started when OpDivo regimen began, and endorses following chemo regimen did not produce similar episodes/symptoms (daughter unable to recall name of chemo drug). Daughter states past 2 days have been particularly difficult for pt and has not been able to function at her baseline (daughter endorses baseline being able to take care of herself, cook, clean). Also describes episode vomiting of small amounts of dark red blood earlier this morning (/), pt has not vomited since. Denies SOB, flank pain, changes in vision, chest pain, abdominal pain, weakness, and paresthesias. Of note: CT Head/chest done in ED, shows new hemorrhagic brain mets and segmental PE. Pt not currently taking any anticoagulation/antiplatelet therapy.     Events last 24 hours:   Had IVC filter yesterday evening-> Upon arrival to ICU after that, she was in respiratory distress, anxious  Last night, GOC changed to comfort only     PAST MEDICAL & SURGICAL HISTORY:  Kidney mass  Asthma  Hyperlipidemia  Hypertension  COPD (chronic obstructive pulmonary disease)  Obstructive sleep apnea: does not use CPAP machine  Mitral valve disorder  Aortic stenosis  Diabetes mellitus  Status post cryoablation: Of R kidney mass 3/7/17  S/P MVR (mitral valve replacement)  S/P AVR  S/P cholecystectomy  S/P tonsillectomy  History of  section: ,     Could not obtain ROS due to underlying mentation    Physical Examination:    ICU Vital Signs Last 24 Hrs  T(C): 36.6 (02 May 2019 19:42), Max: 36.7 (02 May 2019 14:47)  T(F): 97.8 (02 May 2019 19:42), Max: 98.1 (02 May 2019 14:47)  HR: 104 (03 May 2019 08:00) (94 - 134)  BP: 134/64 (02 May 2019 20:00) (113/56 - 142/65)  BP(mean): 91 (02 May 2019 20:00) (91 - 95)  ABP: --  ABP(mean): --  RR: 10 (03 May 2019 08:00) (10 - 35)  SpO2: 90% (03 May 2019 08:00) (90% - 97%)      General: mild-moderate resp distress on NRB earlier this AM     Neuro: Lethargic not following commands     HEENT: Pupils equal, reactive to light, Oral mucosa dry     PULM: Clear to auscultation bilaterally with decreased basal breath sounds, no significant adventitious breath sounds     CVS: Regular rhythm and controlled rate, no murmurs, rubs, or gallops    ABD: Soft, nondistended, nontender, normoactive bowel sounds, no CVA tenderness    EXT: b/l LE edema, nontender with pedal pulse palpable     SKIN: colder on extremities, no acute rashes       Medications:        levETIRAcetam  IVPB 500 milliGRAM(s) IV Intermittent every 12 hours  LORazepam   Injectable 2 milliGRAM(s) IV Push every 1 hour PRN  morphine  - Injectable 4 milliGRAM(s) IV Push every 1 hour PRN  morphine  Infusion 4 mG/Hr IV Continuous <Continuous>  scopolamine   Patch 1 Patch Transdermal every 72 hours  dexamethasone  Injectable 4 milliGRAM(s) IV Push every 6 hours  chlorhexidine 2% Cloths 1 Application(s) Topical daily    I&O's Detail    RADIOLOGY/ Microbiology/ Labs: reviewed     CRITICAL CARE TIME SPENT: 35 min

## 2019-05-03 NOTE — DIETITIAN INITIAL EVALUATION ADULT. - OTHER INFO
63 year old female with PMHx of renal cell carcinoma (diagnosed 3/17) s/p right kidney cryoablation (3/17) currently on OpDivo for past 3 weeks and with known mets to pelvis, thoracic/lumbar spine, lungs, and liver, now with new hemorrhagic metastatic lesion of brain on head CT today and anterior basilar segmental pulmonary embolism of RLL, s/p IVC filter. Pt currently NPO. Aware comfort measures now initiated.

## 2019-05-03 NOTE — CHART NOTE - NSCHARTNOTEFT_GEN_A_CORE
Hem/onc and rad/onc with no further systemic therapy available for patient given progression of disease. Patient is now DNR/I    No further neurosurgical recommendations  Palliative care consult  Further supportive care/medical management per primary team

## 2019-05-03 NOTE — PROGRESS NOTE ADULT - PROBLEM SELECTOR PROBLEM 1
Intracranial bleeding
Acute pulmonary embolism without acute cor pulmonale, unspecified pulmonary embolism type

## 2019-05-03 NOTE — DISCHARGE NOTE FOR THE EXPIRED PATIENT - HOSPITAL COURSE
63 year old female with PMHx HTN, ARIK, HLD, COPD, Mitral valve and Aortic valve replacement (2014), DM, and Renal Cell Carcinoma (diagnosed 3/17) s/p right kidney cryoablation (3/17) currently on OpDivo for past 3 weeks and with known mets to pelvis, thoracic/lumbar spine, lungs, and liver, presents with 3 week history of lethargy, dizziness, h/a, vomiting (1 episode of hematemesis this morning as per daughter) and AMS. Pt's daughter endorsed that pt's symptoms have been ongoing for 3 weeks, with intermittent episodes of 1-2 days of confusion followed by 3-4 days of clear sensorium, but has significantly worsened the past week. During this time pt experiences uncontrollable shaking and dizziness, especially when standing up from sitting/laying position (daughter is adamant that pt does not have feeling of passing out), h/a unrelieved by tylenol, nausea, vomiting and AMS. As per daughter, pt had worst week last week, describing episodes of confusion and even hallucinations, and without being able to "get up out of bed all day". Daughter states these symptoms have started when OpDivo regimen began, and endorses following chemo regimen did not produce similar episodes/symptoms (daughter unable to recall name of chemo drug). Daughter states past 2 days have been particularly difficult for pt and has not been able to function at her baseline (daughter endorses baseline being able to take care of herself, cook, clean). Also describes episode vomiting of small amounts of dark red blood earlier this morning (5/2), pt has not vomited since. Of note: CT Head/chest done in ED, shows new hemorrhagic brain mets and segmental PE. Developed Acute hypoxic respiratory failure and worsened AMS.    Patient made comfort care and developed asystolic cardiac arrest and was pronounced dead my myself @ 1314 on 5/3/19. 63 year old female with PMHx HTN, ARIK, HLD, COPD, Mitral valve and Aortic valve replacement (2014), DM, and Renal Cell Carcinoma (diagnosed 3/17) s/p right kidney cryoablation (3/17) currently on OpDivo for past 3 weeks and with known mets to pelvis, thoracic/lumbar spine, lungs, and liver, presents with 3 week history of lethargy, dizziness, h/a, vomiting (1 episode of hematemesis this morning as per daughter) and AMS. Pt's daughter endorsed that pt's symptoms have been ongoing for 3 weeks, with intermittent episodes of 1-2 days of confusion followed by 3-4 days of clear sensorium, but has significantly worsened the past week. During this time pt experiences uncontrollable shaking and dizziness, especially when standing up from sitting/laying position (daughter is adamant that pt does not have feeling of passing out), h/a unrelieved by tylenol, nausea, vomiting and AMS. As per daughter, pt had worst week last week, describing episodes of confusion and even hallucinations, and without being able to "get up out of bed all day". Daughter states these symptoms have started when OpDivo regimen began, and endorses following chemo regimen did not produce similar episodes/symptoms (daughter unable to recall name of chemo drug). Daughter states past 2 days have been particularly difficult for pt and has not been able to function at her baseline (daughter endorses baseline being able to take care of herself, cook, clean). Also describes episode vomiting of small amounts of dark red blood earlier this morning (5/2), pt has not vomited since. Of note: CT Head/chest done in ED, shows new hemorrhagic brain mets and segmental PE. Developed Acute hypoxic respiratory failure and worsened AMS.    Patient made comfort care and developed asystolic cardiac arrest and was pronounced dead by myself @ 1314 on 5/3/19.

## 2019-05-03 NOTE — DISCHARGE NOTE FOR THE EXPIRED PATIENT - SECONDARY DIAGNOSIS.
Intracranial bleeding Renal cell carcinoma, unspecified laterality Acute pulmonary embolism without acute cor pulmonale, unspecified pulmonary embolism type

## 2019-05-03 NOTE — PROGRESS NOTE ADULT - PROBLEM SELECTOR PROBLEM 3
Acute pulmonary embolism without acute cor pulmonale, unspecified pulmonary embolism type
Brain metastases

## 2019-05-03 NOTE — PROGRESS NOTE ADULT - ASSESSMENT
1: Metastatic RCC  2: Hemorrhagic brain met  3: Acute encephalopathy mostly metabolic with component of hypoxic  4: Acute hypoxic respiratory failure with right sided subsegmental PE s/p IVC filter      Patient seen and examined  From Oncology perspective, no further Tx could be offered considering overall condition  Comfort measures implemented last night  Morphine gtt to comfort with PRN Ativan and c.w Keppra, decadron scopolamine for comfort as well  Plan d/w Daughter at bedside  Emotional support provided   Would continue managing in icu for now
Patient is a 63 year old female with PMHx of Renal Cell Carcinoma (diagnosed 3/17) s/p right kidney cryoablation (3/17) currently on OpDivo for past 3 weeks and with known mets to pelvis, thoracic/lumbar spine, lungs, and liver, now with new hemorrhagic metastatic lesion of brain on head CT today and anterior basilar segmental pulmonary embolism of RLL. s/p IVC filter now appears to be actively dying

## 2019-05-03 NOTE — DIETITIAN INITIAL EVALUATION ADULT. - PROBLEM SELECTOR PLAN 3
Unable to anticoagulate due to acute ICH / brain mets  Due to active cancer, at high risk for worsening clot burden  Will consult IR for IVC filter placement

## 2019-05-03 NOTE — DIETITIAN INITIAL EVALUATION ADULT. - NUTRITIONGOAL OUTCOME1
Nutrition/hydration per pt/pt family wishes Nutrition/hydration per pt/pt family wishes- RD to remain available as needed

## 2019-05-07 LAB
CULTURE RESULTS: SIGNIFICANT CHANGE UP
CULTURE RESULTS: SIGNIFICANT CHANGE UP
SPECIMEN SOURCE: SIGNIFICANT CHANGE UP
SPECIMEN SOURCE: SIGNIFICANT CHANGE UP

## 2019-05-09 ENCOUNTER — APPOINTMENT (OUTPATIENT)
Dept: HEMATOLOGY ONCOLOGY | Facility: CLINIC | Age: 64
End: 2019-05-09

## 2019-05-21 NOTE — PHYSICAL EXAM
[Restricted in physically strenuous activity but ambulatory and able to carry out work of a light or sedentary nature] : Status 1- Restricted in physically strenuous activity but ambulatory and able to carry out work of a light or sedentary nature, e.g., light house work, office work [Normal] : affect appropriate [de-identified] : B/L plantar feet with few crescenteric blisters, L>R.  Erythema now resolved.  Xeroderma B/L hands

## 2019-05-21 NOTE — HISTORY OF PRESENT ILLNESS
[de-identified] : The patient was diagnosed with recurrent renal cell carcinoma in March 2018 at the age of 62.  She was initially diagnosed with B/L clear cell renal cell carcinoma in March 2017 and has been followed by Urology, Dr. Rodney Rodriguez.  She underwent percutaneous cryoablation (considered a high surgical risk due to her multiple comorbidities) of a right renal mass in March 2017 and a left renal mass in May 2017 by IR, Dr. Waqar Landa.  Pathology was suspicious for clear cell carcinoma.  Her first follow up CT A/P on 8/30/17 stated interval successful cryoablation of bilateral solid renal masses without evidence of residual or recurrent disease.  Follow up MRI on 3/23/18 showed a posterior left interpolar lesion measures 3.1 x 3 cm, previously measuring 3.8 x 3.4 cm on 8/30/2017, and demonstrates an enhancing mural nodule, suspicious for residual/recurrent disease. New 1.7 x 1.2 cm enhancing lesion in the left posterior pararenal space abutting the transversalis fascia, consistent with residual disease. She was initially being considered for repeat cryoablation of the left kidney lesion, but in recent months she started using a treadmill in an attempt to improve her health and lose weight when she thought she pulled a muscle in the right groin. The pain progressively worsened and she presented to [facility] ED. A CT scan 4/27/18 showed a new 4 cm lytic lesion in the inferior right pubic ramus with small soft tissue component suspicious for solitary metastatic disease. It also showed small bilateral pulmonary nodules which are nonspecific but may potentially represent metastatic disease.  She was evaluated by Dr Barbosa on 5/3/18 for palliative RT of this pubic ramus lesion.  She presents today for consideration of systemic treatment.  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  Initially started on 5/27/18.  40mg treatment held between 6/20/18 - 7/22/18, reduced to 20mg starting 7/23/18. [de-identified] : Patient presents on C5D15 Nivolumab.  She is s/p pazopanib and s/p cabozantinib both discontinued due to H/F syndrome. + Hot flashes have become more severe, soaking diaphoresis and she is unable to concentrate, eat, drink or converse and also gets severe chills at this time.  + Anticipatory anxiety.  +Neck pain, less severe, slight improvement with pain meds.  LBP and pelvis pain, right groin pain all unchanged recently but remain severe. + Intermittent diarrhea alternating with constipation, unchanged. + Hair thinning, unchanged. + Fatigue, worse recently.  Appetite poor and has recently lost weight. No rash, no pruritus.

## 2019-05-21 NOTE — RESULTS/DATA
[FreeTextEntry1] : 3/18/19 CT Thoracic and Cervical Spine: \par \par 2/5/19 CT A/P: Findings compatible with interval disease progression.\par Increase in the previously noted enhancing left perinephric mass.\par Interval development/worsening of numerous pulmonary nodules measuring up to 1 cm with increase in size of the previously noted destructive lesion involving the right superior and inferior public rami bones with additional likely pelvic bony metastases.\par Hypervascular lesion in the posterior right hepatic lobe new from prior exam may represent a hypervascular metastasis. Stable right adrenal nodule.\par \par 11/3/18 CT C/A/P: Bilateral pulmonary nodules, some of which have slightly increased in size 8/16/18. Stable left renal and perinephric masses. Interval worsening osseous metastasis involving the right pubic ramus. \par \par 5/11/18 Pathology - right pubic lytic lesion, needle core biopsy:  Few clusters of neoplastic cells, histologically compatible with renal cell carcinoma - clear cell type, in background of necrosis and hemorrhage. \par \par 5/10/18 PET:  FDG avid soft tissue nodule in the left posterior pararenal space consistent with recurrent disease. Left lower pole renal lesion with enhancing mural nodule is better seen on MRI. Right lower pole renal lesion with central fat and peripheral FDG avidity is nonspecific, probably represents changes related to ablation. Continued follow-up is suggested. Expansile lytic lesion right pubic symphysis involving the anterior acetabulum and inferior pubic ramus, suspicious for metastasis. \par \par 4/26/18 CT Abdomen: Bibasilar micronodules and calcified granulomas. Bibasilar discoid atelectasis and/or scarring. Redemonstrated complex lesion in the left posterior perirenal space abutting Gerota's fascia is consistent with recurrent disease, better characterized on prior MRI. Again seen is the mid posterior left renal complex cystic lesion showing mural nodularity as seen on prior MRI. In the right posterior perirenal space, encapsulated fat with associated stranding likely reflects fat necrosis from prior ablation. New 4 cm lytic lesion in the inferior right pubic ramus with small soft tissue component and system with metastatic disease.\par \par 4/26/18 U/S RLE: No evidence for acute DVT in the right lower extremity deep venous systems. \par \par 3/23/18 MRI A/P: 2.2 right adrenal adenoma. 2.1 cm right myelolipoma. Right kidney: Posterior right lower pole lesion, measuring 1.6 x 1.6 cm, unchanged in size when compared to 8/30/2017 and does not demonstrate enhancement. Postoperative changes in the right lower pole including fat necrosis. Right renal cyst. Left kidney: Posterior left interpolar lesion measures 3.1 x 3 cm, previously measuring 3.8 x 3.4 cm on 8/30/2017, and demonstrates an enhancing mural nodule, suspicious for residual/recurrent disease. New 1.7 x 1.2 cm enhancing lesion in the left posterior pararenal space abutting the transversalis fascia, consistent with residual disease. \par \par 8/30/17 CT A/P: Postprocedural changes with halo of probable fat necrosis in the posterior right lower pole and posterior left interpolar region at the site of previously seen enhancing renal masses. The masses have slightly decreased in size and no longer demonstrate enhancement. For example: Posterior right lower pole lesion, measures 1.6 x 1.6 cm, previously 1.9 x 1.8 cm.  Posterior left interpolar lesion, measures 3.8 x 3.4 cm, previously 4.0 x 3.5 cm.  Since January 17, 2017, interval successful cryoablation of bilateral solid renal masses without evidence of residual or recurrent disease.  \par \par 5/16/17 Pathology: Kidney, left, mass, biopsy: Rare atypical cells, suspicious for renal cell carcinoma.  The biopsy shows predominantly necrotic material with rare atypical cells. The atypical cells are positive for PAX-8. The above finding are suspicious for renal cell carcinoma.\par \par 3/7/17 Pathology: KIDNEY, RIGHT LOWER, CT GUIDED CORE BX AND FNA SUSPICIOUS FOR NEOPLASM.  Cytology slides and cell block showing scant material, suspicious\par for clear cell carcinoma.  Core biopsy : Non-diagnostic material.  Although qualitatively suspicious as mentioned above, the quantity is very scant for any further studies for sub classification.

## 2019-05-23 ENCOUNTER — APPOINTMENT (OUTPATIENT)
Age: 64
End: 2019-05-23

## 2019-05-30 ENCOUNTER — APPOINTMENT (OUTPATIENT)
Dept: HEMATOLOGY ONCOLOGY | Facility: CLINIC | Age: 64
End: 2019-05-30

## 2019-05-30 ENCOUNTER — APPOINTMENT (OUTPATIENT)
Age: 64
End: 2019-05-30

## 2019-06-13 ENCOUNTER — APPOINTMENT (OUTPATIENT)
Age: 64
End: 2019-06-13

## 2019-06-20 ENCOUNTER — APPOINTMENT (OUTPATIENT)
Age: 64
End: 2019-06-20

## 2019-06-27 ENCOUNTER — APPOINTMENT (OUTPATIENT)
Dept: HEMATOLOGY ONCOLOGY | Facility: CLINIC | Age: 64
End: 2019-06-27

## 2020-07-10 NOTE — H&P ADULT. - RESPIRATORY
Breath Sounds equal & clear to percussion & auscultation, no accessory muscle use
18yo F with no evidence of acute illness or infection.     No known family h/o adverse reactions to anesthesia or excessive bleeding.   Aware to notify surgeon's office if child develops any s/s of acute illness prior to DOS.     COVID swab to be obtained on 7/11/20 at [address]

## 2020-08-31 NOTE — H&P PST ADULT - NS PRO ABUSE SCREEN AFRAID ANYONE YN
Admitting History and Physical





- Admission


History of Present Illness: 


Per EMR-


66 year old female patient with past medical history that includes broken heart 

syndrome, HTN, and HLD, who presented to the emergency room after a loss of 

consciousness.  The patient had been eating dinner with her cousin and fiance 

when, per the patient's fiance, the patient suddenly stopped talking and then 

made snoring sounds for about a minute.  The patient reports having loss of 

consciousness during this time, as well as urinary incontinence.  The patient's 

fiance denied seeing the patient shaking, reporting that he only saw her right 

hand ball up into a fist, but he reports that the patient's cousin reported seei

ng the patient's arm shake.  The fiance then tried to get the patient out of her

trance, including trying to "do the heimlech maneuver".  The patient then came 

to and became nauseous and vomited 3 to 5 times, which the patient reports only 

had the stomach contents of her dinner.  The patient then went to the bathroom 

and vomited 2 more times.  The patient then went to the bedroom and vomited 2 

more times.  The patient reports vomiting 2 more times with EMS while coming to 

the ED.








Per neurology-normal ct head and normal neuro exam.


Most likely this event seems to be syncope but possibility of  of complex 

Partial seizure cant be be ruled out


Plan:


-- no need for AED


- MRI of brain 


- eeg


- seizrue precautions and driving precautions discussed with pateint


- tele monitoring





                                Selected Entries











  08/31/20 08/31/20 08/31/20





  00:51 07:04 07:05


 


Temperature  98.1 F 98 F


 


Pulse Rate [ 68 70 71





Apical]   


 


Blood Pressure 118/66 146/78 126/77





[Left Arm]   








                                Laboratory Tests











  08/30/20 08/30/20 08/31/20





  21:10 23:05 06:55


 


WBC  11.2 H   7.0


 


COVID-19 (MARY)   Pending 








NPO





- Smoking History


Smoking history: Never smoked


Have you smoked in the past 12 months: No





History





- Admission


Reason For Visit: SYNCOPE AND COLLAPSE





Recommendations





- Speech Evaluation, Impression/Plan


Impression: Chart reviewed. Discharged before seen. Not evaluated. no

## 2020-09-09 NOTE — H&P ADULT. - PROBLEM SELECTOR PROBLEM 5
-Return to clinic with any injection site redness  -Tylenol as needed and directed for discomforts    -Current VIS rendered        *Vaccine screen scanned under screening forms     Type 2 diabetes mellitus without complication, with long-term current use of insulin

## 2021-06-04 NOTE — ED ADULT TRIAGE NOTE - HEIGHT IN INCHES
4 Trilobed Flap Text: The defect edges were debeveled with a #15 scalpel blade.  Given the location of the defect and the proximity to free margins a trilobed flap was deemed most appropriate.  Using a sterile surgical marker, an appropriate trilobed flap drawn around the defect.    The area thus outlined was incised deep to adipose tissue with a #15 scalpel blade.  The skin margins were undermined to an appropriate distance in all directions utilizing iris scissors.

## 2021-06-22 NOTE — PATIENT PROFILE ADULT. - ARE SIGNIFICANT INDICATORS COMPLETE.
Jaspal Soler attended 3 hours of group therapy today.    Total group size of 7.    1/2/2019 2:13 PM Steph Melo MA, [facility]   No Yes

## 2021-07-14 NOTE — ED ADULT NURSE NOTE - NSSISCREENINGQ2_ED_A_ED
[facility] Call Center    Phone Message    May a detailed message be left on voicemail: yes     Reason for Call: Medication Refill Request    Has the patient contacted the pharmacy for the refill? Yes   Name of medication being requested: test strips    Provider who prescribed the medication: SHANIUQE Archibald    Pharmacy: [place], [place] - [address] [place] [address] [phone]    Date medication is needed: per pt, refill for test strips but was told medicare will not cover due to no f/u appt with provider. Pt scheduled next avail 07/30/21.          Action Taken: Message routed to:  [facility] ([facility] endo    Travel Screening: Not Applicable                                                                       No

## 2021-12-02 NOTE — PROGRESS NOTE ADULT - REASON FOR ADMISSION
[FreeTextEntry1] : Pt descended to 2.0 MABEL @ 1.75 PSI/MIN without incident in chamber #3.\par Pt resting comfortably @ depth, with equal chest rise observed throughout tx.\par Pt ascended from 2.0 MABEL @ 1.75 PSI/MIN without incident in chamber #3.\par Pt tolerated treatment well.\par 
New METs to brain, PE, AMS
New METs to brain, PE, AMS

## 2022-11-24 NOTE — ED STATDOCS - PMH
Aortic stenosis    Asthma    COPD (chronic obstructive pulmonary disease)    Diabetes mellitus    Hyperlipidemia    Hypertension    Kidney mass    Mitral valve disorder    Obstructive sleep apnea  does not use CPAP machine 110

## 2023-02-09 NOTE — HISTORY OF PRESENT ILLNESS
[FreeTextEntry1] : Patient is seen today for a routine diabetic follow up.\par Quality:  type 2 DM\par Severity:  moderate\par Duration of diabetes:  since 2012\par Associated Complications/ Symptoms:  Nephropathy and neuropathy\par Modifying Factors:  Better with medication\par \par Patient tests blood glucose 3-4 times per day.  Denies any recent hypoglycemia.  \par \par Current Diabetic Medication Regimen:\par Lantus 60 units qhs (uses Autoshield needle as she is fearful of injections)\par Trulicity 1.5 mg qweek\par \par Still undergoing chemo (now IV) for metastatic RTC.   Complains of nausea and decreased appetite.  
09-Feb-2023 12:20

## 2023-10-01 PROBLEM — Z79.899 MEDICAL MARIJUANA USE: Status: ACTIVE | Noted: 2018-12-19

## 2024-02-15 NOTE — HISTORY OF PRESENT ILLNESS
15-Feb-2024 11:48
[FreeTextEntry1] : Ms. Winters is a 63 year old female returns today for follow up.  s/p 2,000cGy for a [id number] lV metastatic renal cell carcinoma to bone pubic ramus completed 9/17/18. Was on Votrient with Dr. Wise currently on hold. Current pain is 8 out of 10 to right pelvis area uses fentanyl patch and 50 mcg oxycodone for breakthrough. C/o fatigue,  Denies change in bowel & urination. She ambulate with walker.. She followed up with Dr Wise on 11/14/18.\par Patient also on Zofran and stool softeners for sequelae of narcotic analgesics.

## 2024-06-26 NOTE — ED ADULT TRIAGE NOTE - BP NONINVASIVE DIASTOLIC (MM HG)
Ambulatory Care Coordination Note     6/26/2024 3:25 PM     Patient outreach attempt by this ACM today to perform care management follow up . ACM was unable to reach the patient by telephone today; left voice message requesting a return phone call to this ACM.     ACM: Octavia Awad RN      [phone]

## 2025-06-20 NOTE — ED PROVIDER NOTE - EYES, MLM
Is This A New Presentation, Or A Follow-Up?: Skin Lesion What Type Of Note Output Would You Prefer (Optional)?: Standard Output Has Your Skin Lesion Been Treated?: not been treated Clear conjunctiva